# Patient Record
Sex: MALE | Race: WHITE | NOT HISPANIC OR LATINO | Employment: OTHER | URBAN - NONMETROPOLITAN AREA
[De-identification: names, ages, dates, MRNs, and addresses within clinical notes are randomized per-mention and may not be internally consistent; named-entity substitution may affect disease eponyms.]

---

## 2017-12-08 ENCOUNTER — OFFICE VISIT (OUTPATIENT)
Dept: CARDIOLOGY | Facility: CLINIC | Age: 52
End: 2017-12-08

## 2017-12-08 VITALS
HEIGHT: 75 IN | WEIGHT: 258 LBS | DIASTOLIC BLOOD PRESSURE: 84 MMHG | SYSTOLIC BLOOD PRESSURE: 145 MMHG | BODY MASS INDEX: 32.08 KG/M2 | HEART RATE: 100 BPM

## 2017-12-08 DIAGNOSIS — I10 ESSENTIAL HYPERTENSION: Primary | ICD-10-CM

## 2017-12-08 DIAGNOSIS — E78.5 DYSLIPIDEMIA: ICD-10-CM

## 2017-12-08 DIAGNOSIS — Z87.898 HISTORY OF CHEST PAIN: ICD-10-CM

## 2017-12-08 DIAGNOSIS — R06.2 WHEEZING: ICD-10-CM

## 2017-12-08 PROCEDURE — 93000 ELECTROCARDIOGRAM COMPLETE: CPT | Performed by: PHYSICIAN ASSISTANT

## 2017-12-08 PROCEDURE — 99214 OFFICE O/P EST MOD 30 MIN: CPT | Performed by: PHYSICIAN ASSISTANT

## 2017-12-08 RX ORDER — METOPROLOL TARTRATE 50 MG/1
50 TABLET, FILM COATED ORAL 2 TIMES DAILY
Qty: 60 TABLET | Refills: 11 | Status: SHIPPED | OUTPATIENT
Start: 2017-12-08 | End: 2018-12-03 | Stop reason: SDUPTHER

## 2017-12-08 RX ORDER — ALBUTEROL SULFATE 90 UG/1
2 AEROSOL, METERED RESPIRATORY (INHALATION) EVERY 4 HOURS PRN
Qty: 8 G | Refills: 1 | Status: SHIPPED | OUTPATIENT
Start: 2017-12-08 | End: 2019-12-02 | Stop reason: SDUPTHER

## 2017-12-08 NOTE — PROGRESS NOTES
Oliverio Escobar MD  áSnchez Chandler  1965 12/08/2017    Patient Active Problem List   Diagnosis   • Essential hypertension   • History of chest pain   • Dyslipidemia     Dear Oliverio Escobar MD:    Chief Complaint   Patient presents with   • Hypertension     follow up     Subjective     Sánchez Chandler is a 52 y.o. male with a past medical history significant for hypertension and a history of previous chest pains.  He is here today for annual cardiology follow-up.  He had a previous inconclusive stress test in 2013. He was only able to achieve about 70% of the predicted maximal heart rate.  He denies any complaints of chest pains, shortness breath, palpitations, or pedal edema.  He has noted his blood pressure will run high at times, usually at night.  Otherwise, he has been doing well.      Current Outpatient Prescriptions:   •  Flaxseed, Linseed, (FLAXSEED OIL) 1000 MG capsule, Take  by mouth., Disp: , Rfl:   •  metoprolol tartrate (LOPRESSOR) 50 MG tablet, Take 1 tablet by mouth 2 (Two) Times a Day., Disp: 60 tablet, Rfl: 11  •  albuterol (PROVENTIL HFA;VENTOLIN HFA) 108 (90 Base) MCG/ACT inhaler, Inhale 2 puffs Every 4 (Four) Hours As Needed for Wheezing., Disp: 8 g, Rfl: 1    The following portions of the patient's history were reviewed and updated as appropriate: allergies, current medications, past family history, past medical history, past social history, past surgical history and problem list.    Social History     Social History   • Marital status: Single     Spouse name: N/A   • Number of children: N/A   • Years of education: N/A     Occupational History   • Not on file.     Social History Main Topics   • Smoking status: Never Smoker   • Smokeless tobacco: Never Used   • Alcohol use No   • Drug use: No   • Sexual activity: Defer     Other Topics Concern   • Not on file     Social History Narrative   • No narrative on file     Review of Systems   Cardiovascular: Negative for chest  "pain, leg swelling and palpitations.   Respiratory: Negative for shortness of breath.      Objective   Blood pressure 145/84, pulse 100, height 190.5 cm (75\"), weight 117 kg (258 lb).    Physical Exam   Constitutional: He is oriented to person, place, and time. He appears well-developed and well-nourished. No distress.   HENT:   Head: Normocephalic and atraumatic.   Eyes: Conjunctivae are normal. Right eye exhibits no discharge. Left eye exhibits no discharge.   Neck: Normal range of motion. Neck supple. Carotid bruit is not present.   Cardiovascular: Normal rate, regular rhythm and normal heart sounds.  Exam reveals no gallop and no friction rub.    No murmur heard.  Pulmonary/Chest: Effort normal and breath sounds normal. No respiratory distress. He has no wheezes. He has no rales. He exhibits no tenderness.   Musculoskeletal: Normal range of motion. He exhibits no edema.   Neurological: He is alert and oriented to person, place, and time.   Skin: Skin is warm and dry. No rash noted. He is not diaphoretic. No erythema. No pallor.   Psychiatric: He has a normal mood and affect. His behavior is normal.   Nursing note and vitals reviewed.      ECG 12 Lead  Date/Time: 12/8/2017 9:25 AM  Performed by: SANGITA BOCANEGRA  Authorized by: RACHAEL VALENZUELA   Comparison: compared with previous ECG   Rhythm: sinus rhythm  Rate: normal  T depression: V1  Q waves: III  Clinical impression: non-specific ECG  Comments: Sinus rhythm with chronic Q waves in lead 3 which are narrow.  No significant change compared to previous EKG.                   Assessment:          Diagnosis Plan   1. Essential hypertension  ECG 12 Lead   2. Dyslipidemia  ECG 12 Lead   3. History of chest pain  ECG 12 Lead    None recent.  No significant changes noted on EKG.   4. Wheezing          Plan:       1. Increase metoprolol tartrate to 50 mg twice a day from only taking it once a day.  2. He has also requested a Ventolin inhaler refill.  I " will go ahead and refill this, however I have requested that he make a follow-up with his primary care provider as he has not seen him in some time.  He needs regular labs and regular screening tests which are appropriate for his age and history.  He expressed understanding and agreed to make an appointment.  3. We will follow-up the patient in one year sooner if needed.    Return in about 1 year (around 12/8/2018).    I appreciate the opportunity to participate in this patient's cardiovascular care.    Best Regards,    Cathie Grewal PA-C

## 2018-12-03 ENCOUNTER — OFFICE VISIT (OUTPATIENT)
Dept: CARDIOLOGY | Facility: CLINIC | Age: 53
End: 2018-12-03

## 2018-12-03 VITALS
SYSTOLIC BLOOD PRESSURE: 136 MMHG | HEART RATE: 81 BPM | RESPIRATION RATE: 16 BRPM | DIASTOLIC BLOOD PRESSURE: 76 MMHG | WEIGHT: 256 LBS | BODY MASS INDEX: 34.67 KG/M2 | HEIGHT: 72 IN

## 2018-12-03 DIAGNOSIS — I10 ESSENTIAL HYPERTENSION: Primary | ICD-10-CM

## 2018-12-03 DIAGNOSIS — E78.5 DYSLIPIDEMIA: ICD-10-CM

## 2018-12-03 PROCEDURE — 93000 ELECTROCARDIOGRAM COMPLETE: CPT | Performed by: NURSE PRACTITIONER

## 2018-12-03 PROCEDURE — 99213 OFFICE O/P EST LOW 20 MIN: CPT | Performed by: NURSE PRACTITIONER

## 2018-12-03 RX ORDER — METOPROLOL TARTRATE 50 MG/1
50 TABLET, FILM COATED ORAL 2 TIMES DAILY
Qty: 60 TABLET | Refills: 11 | Status: SHIPPED | OUTPATIENT
Start: 2018-12-03 | End: 2019-12-02 | Stop reason: SDUPTHER

## 2018-12-03 NOTE — PROGRESS NOTES
Oliverio Escobar MD  Sánchez Chandler  1965 12/03/2018    Patient Active Problem List   Diagnosis   • Essential hypertension   • History of chest pain   • Dyslipidemia       Dear Oliverio Escobar MD:    Subjective     Chief Complaint   Patient presents with   • Follow-up     1 yr   • Shortness of Breath     mod exertion   • Med Management     presented           History of Present Illness:    Sánchez Chandler is a 53 y.o. male with a history of hypertension, dyslipidemia, and previous complaints of chest pain. He is here today for annual follow up. He reports he has been doing well. He denies chest pain, shortness of breath, palpitations, dizziness, lightheadedness, and edema. He has not been checking his blood pressure routinely. BP in the office today is controlled.          Allergies   Allergen Reactions   • Atorvastatin    • Penicillins    :      Current Outpatient Medications:   •  albuterol (PROVENTIL HFA;VENTOLIN HFA) 108 (90 Base) MCG/ACT inhaler, Inhale 2 puffs Every 4 (Four) Hours As Needed for Wheezing., Disp: 8 g, Rfl: 1  •  Flaxseed, Linseed, (FLAXSEED OIL) 1000 MG capsule, Take  by mouth., Disp: , Rfl:   •  metoprolol tartrate (LOPRESSOR) 50 MG tablet, Take 1 tablet by mouth 2 (Two) Times a Day., Disp: 60 tablet, Rfl: 11      The following portions of the patient's history were reviewed and updated as appropriate: allergies, current medications, past family history, past medical history, past social history, past surgical history and problem list.    Social History     Tobacco Use   • Smoking status: Never Smoker   • Smokeless tobacco: Never Used   Substance Use Topics   • Alcohol use: No   • Drug use: No       Review of Systems   Constitution: Negative for malaise/fatigue.   Cardiovascular: Negative for chest pain, dyspnea on exertion, leg swelling and palpitations.   Respiratory: Negative for cough, shortness of breath and wheezing.    Neurological: Negative for dizziness and  "light-headedness.       Objective   Vitals:    12/03/18 0920   BP: 136/76   Pulse: 81   Resp: 16   Weight: 116 kg (256 lb)   Height: 182.9 cm (72\")     Body mass index is 34.72 kg/m².        Physical Exam   Constitutional: He is oriented to person, place, and time. He appears well-developed and well-nourished.   HENT:   Head: Normocephalic and atraumatic.   Cardiovascular: Normal rate, regular rhythm and normal heart sounds. Exam reveals no S3 and no S4.   No murmur heard.  Pulmonary/Chest: Effort normal and breath sounds normal. He has no wheezes. He has no rales.   Abdominal: Soft. Bowel sounds are normal.   Musculoskeletal: He exhibits no edema.   Neurological: He is alert and oriented to person, place, and time.   Skin: Skin is warm and dry.   Psychiatric: He has a normal mood and affect. His behavior is normal.       No results found for: NA, K, CL, CO2, BUN, CREATININE, LABGLOM, GLUCOSE, CALCIUM, AST, ALT, ALKPHOS, LABIL2  No results found for: CKTOTAL  No results found for: WBC, HGB, HCT, PLT  No results found for: INR  No results found for: MG  No results found for: TSH, PSA, CHLPL, TRIG, HDL, LDL   No results found for: BNP          ECG 12 Lead  Date/Time: 12/3/2018 9:22 AM  Performed by: Caridad Cassidy APRN  Authorized by: Caridad Cassidy APRN   Comparison: compared with previous ECG   Similar to previous ECG  Rhythm: sinus rhythm  BPM: 81  Q waves: III  Comments: No change compared to prior ECG 12/2017              Assessment/Plan    Diagnosis Plan   1. Essential hypertension  Comprehensive Metabolic Panel    metoprolol tartrate (LOPRESSOR) 50 MG tablet   2. Dyslipidemia  Lipid Panel                Recommendations:    1. Will continue current dose of metoprolol    2. I have ordered a CMP and FLP    3. I have also encouraged him to schedule a wellness check up with his PCP since he reports he has not had follow up there in years. He is agreeable.    4. Follow up in 1 year and PRN           "     Return in about 1 year (around 12/3/2019) for Recheck.    As always, I appreciate very much the opportunity to participate in the cardiovascular care of your patients.      With Best Regards,    JULIÁN Paz

## 2018-12-05 ENCOUNTER — TELEPHONE (OUTPATIENT)
Dept: CARDIOLOGY | Facility: CLINIC | Age: 53
End: 2018-12-05

## 2018-12-05 NOTE — TELEPHONE ENCOUNTER
Spoke with patient and his spouse.  He is taking flaxseed only for cholesterol. States he may have taken something in the past? Stated thinks may have been allergic to it?

## 2018-12-05 NOTE — TELEPHONE ENCOUNTER
----- Message from JULIÁN Paz sent at 12/5/2018 10:42 AM EST -----  His blood work indicates he needs to be on a statin for cholesterol. Has he ever taken any of these medications?

## 2018-12-06 NOTE — TELEPHONE ENCOUNTER
Pt notes that atorvastatin caused him to get an itchy, red rash.  He says he has never tried a different cholesterol med.

## 2018-12-10 ENCOUNTER — SPECIALTY PHARMACY (OUTPATIENT)
Dept: PHARMACY | Facility: HOSPITAL | Age: 53
End: 2018-12-10

## 2018-12-10 NOTE — PROGRESS NOTES
"Pharmacy Note  Medication: Repatha    Dose: 140mg Q 2 weeks  Referring Provider: Caridad Cassidy  Start Date: 12/10/18  Last Injection: N/A  PA status/Time Period:     Diagnosis Details:     Established CVD (with primary hyperlipidemia):  ACS -  Hx of MI -  Stable/unstable angina +  Coronary or other arterial revascularization -  Stroke -   PAD -  Other:   Familial Hypercholesterolemia (FH):   N/A  Past Medical History:   Diagnosis Date   • HTN (hypertension)      Social History     Socioeconomic History   • Marital status: Single     Spouse name: Not on file   • Number of children: Not on file   • Years of education: Not on file   • Highest education level: Not on file   Social Needs   • Financial resource strain: Not on file   • Food insecurity - worry: Not on file   • Food insecurity - inability: Not on file   • Transportation needs - medical: Not on file   • Transportation needs - non-medical: Not on file   Occupational History   • Not on file   Tobacco Use   • Smoking status: Never Smoker   • Smokeless tobacco: Never Used   Substance and Sexual Activity   • Alcohol use: No   • Drug use: No   • Sexual activity: Defer   Other Topics Concern   • Not on file   Social History Narrative   • Not on file     Allergies   Allergen Reactions   • Atorvastatin Hives and Rash     Pt states atorvastatin make \"itchy, red bumps appear all over his body\"   • Penicillins      Current Outpatient Medications   Medication Sig Dispense Refill   • albuterol (PROVENTIL HFA;VENTOLIN HFA) 108 (90 Base) MCG/ACT inhaler Inhale 2 puffs Every 4 (Four) Hours As Needed for Wheezing. 8 g 1   • Flaxseed, Linseed, (FLAXSEED OIL) 1000 MG capsule Take  by mouth.     • metoprolol tartrate (LOPRESSOR) 50 MG tablet Take 1 tablet by mouth 2 (Two) Times a Day. 60 tablet 11     No current facility-administered medications for this visit.        Labs  No results found for: CHOL, CHLPL, TRIG, HDL, LDL, LDLDIRECT    Assessment     Drug Dates Duration "   Current Lipid-lowering Thearpy        Previous Lipid-lowering Therapy atorvastatin  Unknown, allergy listed                           Pt was assessed today for PCSK9 inhibitor therapy.  The patient denies any allergies to the medication or latex and denies pregnancy, breastfeeding, or planning to become pregnant.      Plan    Will order Repatha 140mg and begin the prior authorization, if applicable.  Will see patient in clinic once prior authorization is obtained for injection training and medication counseling and follow-up with patient as necessary.

## 2018-12-11 ENCOUNTER — TELEPHONE (OUTPATIENT)
Dept: CARDIOLOGY | Facility: CLINIC | Age: 53
End: 2018-12-11

## 2018-12-12 NOTE — TELEPHONE ENCOUNTER
The Repatha was rejected by pt's insurance. I have scanned in the rejection form in this encounter.

## 2018-12-17 ENCOUNTER — TELEPHONE (OUTPATIENT)
Dept: CARDIOLOGY | Facility: CLINIC | Age: 53
End: 2018-12-17

## 2018-12-17 NOTE — TELEPHONE ENCOUNTER
----- Message from Lavinia Grimaldo RPH sent at 12/17/2018  8:55 AM EST -----  Regarding: PCSK9-Inhibitor Referral Patient   [facility] received a referral for the patient for Repatha 140 mg [id number]. We had the patient scheduled for his 1st clinic visit this morning. Upon arriving to pick his medication up at the pharmacy, he asked for a pill form of the medication and reported that he did not want an injection. I attempted to explain that the injection was every 2 weeks and that they were here today so that they could learn about the medication and how to inject it. Neither the patient nor the spouse wanted to proceed with the injection at this time. I encouraged them to reach out to your office to let you know and explained to them that it was important for his health to make sure he was on appropriate therapy to help control his cholesterol levels. The medication has been put on hold in the pharmacy.     If the patient changes their mind after you all speak with him and he would like to return this week, we could get him scheduled. If not, and the patient decides on a later date a new referral will need to be sent. If you don't mind to let the clinic know whether the injection will be pursued further or if the patients therapy will be changed.    Please give me a call if you have any questions [phone]      Thank you,  Lavinia Tristan. DEEJAY Grimaldo  12/17/18  9:30 AM

## 2018-12-18 NOTE — TELEPHONE ENCOUNTER
Patient came by office today to say he was not interested in taking any injection form of cholesterol med, Repatha. He is agreeable with any by mouth form.

## 2018-12-19 RX ORDER — EZETIMIBE 10 MG/1
10 TABLET ORAL DAILY
Qty: 30 TABLET | Refills: 6 | Status: SHIPPED | OUTPATIENT
Start: 2018-12-19 | End: 2019-12-02 | Stop reason: ALTCHOICE

## 2018-12-19 NOTE — TELEPHONE ENCOUNTER
Called Sánchez advised him. He expressed understanding. Sent in Zetia to [facility] in [place].

## 2018-12-20 ENCOUNTER — TELEPHONE (OUTPATIENT)
Dept: CARDIOLOGY | Facility: CLINIC | Age: 53
End: 2018-12-20

## 2018-12-20 NOTE — TELEPHONE ENCOUNTER
Pt called and stated that when he went to  his Zetia they advised him that he would have to pay $80 for it.   I advised him that I will call his pharmacy and see if it needs a PA. He expressed understanding.     Called [facility] in [place]. They stated that his Zetia does need a PA on it. I advised them to fax it over and I will put it on Milagro's desk.     Called pt back and advised him. He expressed understanding.

## 2019-05-17 RX ORDER — ALBUTEROL SULFATE 90 UG/1
AEROSOL, METERED RESPIRATORY (INHALATION)
Refills: 1 | OUTPATIENT
Start: 2019-05-17

## 2019-12-02 ENCOUNTER — OFFICE VISIT (OUTPATIENT)
Dept: CARDIOLOGY | Facility: CLINIC | Age: 54
End: 2019-12-02

## 2019-12-02 VITALS
OXYGEN SATURATION: 92 % | SYSTOLIC BLOOD PRESSURE: 141 MMHG | HEIGHT: 72 IN | BODY MASS INDEX: 34.95 KG/M2 | DIASTOLIC BLOOD PRESSURE: 78 MMHG | HEART RATE: 107 BPM | WEIGHT: 258 LBS | RESPIRATION RATE: 16 BRPM

## 2019-12-02 DIAGNOSIS — I10 ESSENTIAL HYPERTENSION: Primary | ICD-10-CM

## 2019-12-02 DIAGNOSIS — E78.5 DYSLIPIDEMIA: ICD-10-CM

## 2019-12-02 DIAGNOSIS — R06.2 WHEEZING: ICD-10-CM

## 2019-12-02 DIAGNOSIS — R05.9 COUGH: ICD-10-CM

## 2019-12-02 PROCEDURE — 99214 OFFICE O/P EST MOD 30 MIN: CPT | Performed by: NURSE PRACTITIONER

## 2019-12-02 PROCEDURE — 93000 ELECTROCARDIOGRAM COMPLETE: CPT | Performed by: NURSE PRACTITIONER

## 2019-12-02 RX ORDER — ALBUTEROL SULFATE 90 UG/1
2 AEROSOL, METERED RESPIRATORY (INHALATION) EVERY 4 HOURS PRN
Qty: 8 G | Refills: 1 | Status: SHIPPED | OUTPATIENT
Start: 2019-12-02

## 2019-12-02 RX ORDER — METOPROLOL TARTRATE 50 MG/1
50 TABLET, FILM COATED ORAL 2 TIMES DAILY
Qty: 60 TABLET | Refills: 11 | Status: SHIPPED | OUTPATIENT
Start: 2019-12-02 | End: 2020-12-10

## 2019-12-02 NOTE — PROGRESS NOTES
"Oliverio Escobar MD  Sánchez Chandler  1965 12/02/2019    Patient Active Problem List   Diagnosis   • Essential hypertension   • History of chest pain   • Dyslipidemia       Dear Oliverio Escobar MD:    Subjective     Chief Complaint   Patient presents with   • Follow-up     hypertension   • Shortness of Breath     coughing   • Med Refill           History of Present Illness:    Sánchez Chandler is a 54 y.o. male with a history of hypertension and dyslipidemia.  He presents today for routine cardiology follow-up.  Previously, LDL was noted to be 200.  The patient cannot tolerate statins due to allergy which causes hives and your urticaria.  Therefore, Repatha was recommended.  However, the patient reports he does not want to give himself a shot.  Therefore, he declined the medication.  His blood pressure has been well controlled.  However, the past few days he has been out of medication.  He is also requesting a refill on albuterol which was initially prescribed for an episode of wheezing.  He has not been following with his primary care as previously recommended. He reports he has had a chronic cough for years.          Allergies   Allergen Reactions   • Atorvastatin Hives and Rash     Pt states atorvastatin make \"itchy, red bumps appear all over his body\"   • Penicillins    :      Current Outpatient Medications:   •  metoprolol tartrate (LOPRESSOR) 50 MG tablet, Take 1 tablet by mouth 2 (Two) Times a Day., Disp: 60 tablet, Rfl: 11  •  albuterol sulfate  (90 Base) MCG/ACT inhaler, Inhale 2 puffs Every 4 (Four) Hours As Needed for Wheezing., Disp: 8 g, Rfl: 1  •  Flaxseed, Linseed, (FLAXSEED OIL) 1000 MG capsule, Take  by mouth., Disp: , Rfl:       The following portions of the patient's history were reviewed and updated as appropriate: allergies, current medications, past family history, past medical history, past social history, past surgical history and problem list.    Social History " "    Tobacco Use   • Smoking status: Never Smoker   • Smokeless tobacco: Never Used   Substance Use Topics   • Alcohol use: No   • Drug use: No       Review of Systems   Constitution: Negative for weakness and malaise/fatigue.   Cardiovascular: Negative for chest pain, dyspnea on exertion, irregular heartbeat, leg swelling, near-syncope, orthopnea, palpitations, paroxysmal nocturnal dyspnea and syncope.   Respiratory: Positive for wheezing. Negative for cough and shortness of breath.    Neurological: Negative for dizziness and light-headedness.       Objective   Vitals:    12/02/19 0920   BP: 141/78   Pulse: 107   Resp: 16   SpO2: 92%   Weight: 117 kg (258 lb)   Height: 182.9 cm (72\")     Body mass index is 34.99 kg/m².        Physical Exam   Constitutional: He is oriented to person, place, and time. He appears well-developed and well-nourished.   HENT:   Head: Normocephalic and atraumatic.   Cardiovascular: Normal rate, regular rhythm and normal heart sounds. Exam reveals no S3 and no S4.   No murmur heard.  Pulmonary/Chest: Effort normal and breath sounds normal. He has no wheezes. He has no rales.   Abdominal: Soft. Bowel sounds are normal.   Musculoskeletal: He exhibits no edema.   Neurological: He is alert and oriented to person, place, and time.   Skin: Skin is warm and dry.   Psychiatric: He has a normal mood and affect. His behavior is normal.           ECG 12 Lead  Date/Time: 12/2/2019 10:11 AM  Performed by: Caridad Cassidy APRN  Authorized by: Caridad Cassidy APRN   Comparison: compared with previous ECG   Similar to previous ECG  Rhythm: sinus rhythm  BPM: 95  Conduction: non-specific intraventricular conduction delay  Q waves: III                  Assessment/Plan    Diagnosis Plan   1. Essential hypertension  metoprolol tartrate (LOPRESSOR) 50 MG tablet    Lipid Panel    Comprehensive Metabolic Panel    ECG 12 Lead   2. Wheezing  albuterol sulfate  (90 Base) MCG/ACT inhaler    XR Chest 2 " View    ECG 12 Lead   3. Cough  XR Chest 2 View    ECG 12 Lead   4. Dyslipidemia  Lipid Panel    Comprehensive Metabolic Panel    ECG 12 Lead                Recommendations:    1.  We have discussed trying the Repatha infusion if this is cost effective for him.  He is agreeable.    2.  Continue current dose of metoprolol.    3.  CMP and lipid panel ordered.    4.  I have ordered a chest x-ray today for his chronic cough and given him 1 refill of albuterol.  However, I have explained he may have asthma or another respiratory condition that needs to be properly evaluated.  I have again asked him to follow-up with his PCP for this.  He voiced understanding.    5. Follow up in 7 months and PRN.    Return in about 7 months (around 7/2/2020) for Recheck.    As always, I appreciate very much the opportunity to participate in the cardiovascular care of your patients.      With Best Regards,    JULIÁN Paz

## 2019-12-03 NOTE — PROGRESS NOTES
Message   Received: Yesterday   Message Contents   Caridad Cassidy APRN [facility]             Pt is agreeable to proceed with Repatha infusion once per month. Please arrange. Thanks.        Referral scanned to chart and faxed to [phone].

## 2019-12-04 ENCOUNTER — MEDICATION THERAPY MANAGEMENT (OUTPATIENT)
Dept: PHARMACY | Facility: HOSPITAL | Age: 54
End: 2019-12-04

## 2019-12-04 NOTE — PROGRESS NOTES
"Pharmacy Note  Medication:  Repatha   Dose: 420mg  Referring Provider: Caridad Cassidy  Start Date: TBD  Last Injection: N/A   PA status/Time Period: TBD    Diagnosis Details:   Hyperlipidemia with statin allergy        Past Medical History:   Diagnosis Date   • HTN (hypertension)      Social History     Socioeconomic History   • Marital status: Single     Spouse name: Not on file   • Number of children: Not on file   • Years of education: Not on file   • Highest education level: Not on file   Tobacco Use   • Smoking status: Never Smoker   • Smokeless tobacco: Never Used   Substance and Sexual Activity   • Alcohol use: No   • Drug use: No   • Sexual activity: Defer     Allergies   Allergen Reactions   • Atorvastatin Hives and Rash     Pt states atorvastatin make \"itchy, red bumps appear all over his body\"   • Penicillins      Current Outpatient Medications   Medication Sig Dispense Refill   • albuterol sulfate  (90 Base) MCG/ACT inhaler Inhale 2 puffs Every 4 (Four) Hours As Needed for Wheezing. 8 g 1   • Flaxseed, Linseed, (FLAXSEED OIL) 1000 MG capsule Take  by mouth.     • metoprolol tartrate (LOPRESSOR) 50 MG tablet Take 1 tablet by mouth 2 (Two) Times a Day. 60 tablet 11     No current facility-administered medications for this visit.        Labs  No results found for: CHOL, CHLPL, TRIG, HDL, LDL, LDLDIRECT    Assessment     Drug Dates Notes   Current Lipid-lowering Thearpy None       Previous Lipid-lowering Therapy atorvastatin  Allergic reaction    zetia 10mg 12/19/18-12/2/19 Discontinued by another provider                     Pt was assessed today for PCSK9 inhibitor therapy.  The patient denies any allergies to the medication or latex.     Plan    Will order Repatha 420mg monthly and begin the prior authorization, if applicable.  Will see patient in clinic once prior authorization is obtained for injection training and medication counseling and follow-up with patient as necessary.        Alma Rosa" Ora Serna, [facility]  12/04/19  3:23 PM

## 2019-12-16 ENCOUNTER — MEDICATION THERAPY MANAGEMENT (OUTPATIENT)
Dept: PHARMACY | Facility: HOSPITAL | Age: 54
End: 2019-12-16

## 2019-12-16 ENCOUNTER — APPOINTMENT (OUTPATIENT)
Dept: PHARMACY | Facility: HOSPITAL | Age: 54
End: 2019-12-16

## 2019-12-16 DIAGNOSIS — E78.5 HYPERLIPIDEMIA, UNSPECIFIED HYPERLIPIDEMIA TYPE: Primary | ICD-10-CM

## 2019-12-16 NOTE — PROGRESS NOTES
"Pharmacy Note  Medication:  Repatha   Dose: 420mg  Referring Provider: Caridad Cassidy  Start Date: 12/16/19  Last Injection: N/A   PA status/Time Period: TBD    Diagnosis Details:   Hyperlipidemia with statin allergy        Past Medical History:   Diagnosis Date   • HTN (hypertension)      Social History     Socioeconomic History   • Marital status: Single     Spouse name: Not on file   • Number of children: Not on file   • Years of education: Not on file   • Highest education level: Not on file   Tobacco Use   • Smoking status: Never Smoker   • Smokeless tobacco: Never Used   Substance and Sexual Activity   • Alcohol use: No   • Drug use: No   • Sexual activity: Defer     Allergies   Allergen Reactions   • Atorvastatin Hives and Rash     Pt states atorvastatin make \"itchy, red bumps appear all over his body\"   • Penicillins      Current Outpatient Medications   Medication Sig Dispense Refill   • albuterol sulfate  (90 Base) MCG/ACT inhaler Inhale 2 puffs Every 4 (Four) Hours As Needed for Wheezing. 8 g 1   • Evolocumab with Infusor (REPATHA PUSHTRONEX SYSTEM) 420 MG/3.5ML solution cartridge Inject 420 mg under the skin into the appropriate area as directed Every 28 (Twenty-Eight) Days. 3.5 mL 5   • Flaxseed, Linseed, (FLAXSEED OIL) 1000 MG capsule Take  by mouth.     • metoprolol tartrate (LOPRESSOR) 50 MG tablet Take 1 tablet by mouth 2 (Two) Times a Day. 60 tablet 11     No current facility-administered medications for this visit.        Labs  No results found for: CHOL, CHLPL, TRIG, HDL, LDL, LDLDIRECT    AssessmentThe patient was provided education and demonstration of proper administration, storage, and common side effects. The patient expressed understanding of information and demonstrated proper administration technique.      Drug Dates Notes   Current Lipid-lowering Thearpy None       Previous Lipid-lowering Therapy atorvastatin  Allergic reaction    zetia 10mg 12/19/18-12/2/19 Discontinued by " another provider                     Pt was assessed today for PCSK9 inhibitor therapy.  The patient denies any allergies to the medication or latex.     Plan    Will order Repatha 420mg monthly and begin the prior authorization, if applicable.  Will see patient in clinic once prior authorization is obtained for injection training and medication counseling and follow-up with patient as necessary.        The patient was provided education and demonstration of proper administration, storage, and common side effects. The patient expressed understanding of information and demonstrated proper administration technique.  The patient and spouse would like to return to the clinic another time to be observed giving the infusion and then would like to do it on their own at home. The patient will be followed up in 4 weeks for his second injection as well as follow-up labs.     Lavinia Tristan. DEEJAY Grimaldo  12/16/19  2:01 PM

## 2020-01-13 ENCOUNTER — MEDICATION THERAPY MANAGEMENT (OUTPATIENT)
Dept: PHARMACY | Facility: HOSPITAL | Age: 55
End: 2020-01-13

## 2020-01-13 NOTE — PROGRESS NOTES
"Pharmacy Note  Medication:  Repatha   Dose: 420mg  Referring Provider: Caridad Cassidy  Start Date: 12/16/19  Last Injection: N/A   PA status/Time Period: TBD    Diagnosis Details:   Hyperlipidemia with statin allergy        Past Medical History:   Diagnosis Date   • HTN (hypertension)      Social History     Socioeconomic History   • Marital status: Single     Spouse name: Not on file   • Number of children: Not on file   • Years of education: Not on file   • Highest education level: Not on file   Tobacco Use   • Smoking status: Never Smoker   • Smokeless tobacco: Never Used   Substance and Sexual Activity   • Alcohol use: No   • Drug use: No   • Sexual activity: Defer     Allergies   Allergen Reactions   • Atorvastatin Hives and Rash     Pt states atorvastatin make \"itchy, red bumps appear all over his body\"   • Penicillins      Current Outpatient Medications   Medication Sig Dispense Refill   • albuterol sulfate  (90 Base) MCG/ACT inhaler Inhale 2 puffs Every 4 (Four) Hours As Needed for Wheezing. 8 g 1   • Evolocumab with Infusor (REPATHA PUSHTRONEX SYSTEM) 420 MG/3.5ML solution cartridge Inject 420 mg under the skin into the appropriate area as directed Every 28 (Twenty-Eight) Days. 3.5 mL 5   • Flaxseed, Linseed, (FLAXSEED OIL) 1000 MG capsule Take  by mouth.     • metoprolol tartrate (LOPRESSOR) 50 MG tablet Take 1 tablet by mouth 2 (Two) Times a Day. 60 tablet 11     No current facility-administered medications for this visit.        Labs   resulted on 12/16/19 (via scanned labs in media tab)  AssessmentThe patient was provided education and demonstration of proper administration, storage, and common side effects. The patient expressed understanding of information and demonstrated proper administration technique.      Drug Dates Notes   Current Lipid-lowering Thearpy None       Previous Lipid-lowering Therapy atorvastatin  Allergic reaction    zetia 10mg 12/19/18-12/2/19 Discontinued by " another provider                     Pt was assessed today for PCSK9 inhibitor therapy.  The patient denies any allergies to the medication or latex.     Plan    Will order Repatha 420mg monthly and begin the prior authorization, if applicable.  Will see patient in clinic once prior authorization is obtained for injection training and medication counseling and follow-up with patient as necessary.        The patient was provided education and demonstration of proper administration, storage, and common side effects. The patient expressed understanding of information and demonstrated proper administration technique.  The patient and spouse came for second clinic visit today and once again demonstrated correct administration and wish to provide injections at home from this point on. Patient was encouraged to call the clinic if they decided they did not want to do the injection at home and could return monthly to the clinic to get injection.   Injection was given in left arm (1st injection given in right arm)  Patient was also asked that he receive new lab work in the next 4 weeks.      Patient would like for medication to be mailed. Information was reviewed for correct phone number and address and given to Natalie.     Daphne Munoz, Pharmacy Intern   Lavinia Grimaldo, Pharm D  01/13/20  11:47 AM

## 2020-02-04 ENCOUNTER — LAB (OUTPATIENT)
Dept: LAB | Facility: HOSPITAL | Age: 55
End: 2020-02-04

## 2020-02-04 DIAGNOSIS — E78.5 HYPERLIPIDEMIA, UNSPECIFIED HYPERLIPIDEMIA TYPE: ICD-10-CM

## 2020-02-04 DIAGNOSIS — I10 ESSENTIAL HYPERTENSION: ICD-10-CM

## 2020-02-04 DIAGNOSIS — E78.5 DYSLIPIDEMIA: ICD-10-CM

## 2020-02-04 LAB
ALBUMIN SERPL-MCNC: 4.4 G/DL (ref 3.5–5.2)
ALBUMIN/GLOB SERPL: 1.8 G/DL
ALP SERPL-CCNC: 61 U/L (ref 39–117)
ALT SERPL W P-5'-P-CCNC: 31 U/L (ref 1–41)
ANION GAP SERPL CALCULATED.3IONS-SCNC: 13.2 MMOL/L (ref 5–15)
AST SERPL-CCNC: 23 U/L (ref 1–40)
BILIRUB SERPL-MCNC: 0.5 MG/DL (ref 0.2–1.2)
BUN BLD-MCNC: 18 MG/DL (ref 6–20)
BUN/CREAT SERPL: 16.1 (ref 7–25)
CALCIUM SPEC-SCNC: 9.2 MG/DL (ref 8.6–10.5)
CHLORIDE SERPL-SCNC: 104 MMOL/L (ref 98–107)
CHOLEST SERPL-MCNC: 108 MG/DL (ref 0–200)
CO2 SERPL-SCNC: 21.8 MMOL/L (ref 22–29)
CREAT BLD-MCNC: 1.12 MG/DL (ref 0.76–1.27)
GFR SERPL CREATININE-BSD FRML MDRD: 69 ML/MIN/1.73
GLOBULIN UR ELPH-MCNC: 2.5 GM/DL
GLUCOSE BLD-MCNC: 100 MG/DL (ref 65–99)
HDLC SERPL-MCNC: 28 MG/DL (ref 40–60)
LDLC SERPL CALC-MCNC: 52 MG/DL (ref 0–100)
LDLC/HDLC SERPL: 1.86 {RATIO}
POTASSIUM BLD-SCNC: 4 MMOL/L (ref 3.5–5.2)
PROT SERPL-MCNC: 6.9 G/DL (ref 6–8.5)
SODIUM BLD-SCNC: 139 MMOL/L (ref 136–145)
TRIGL SERPL-MCNC: 139 MG/DL (ref 0–150)
VLDLC SERPL-MCNC: 27.8 MG/DL (ref 5–40)

## 2020-02-04 PROCEDURE — 80061 LIPID PANEL: CPT

## 2020-02-04 PROCEDURE — 36415 COLL VENOUS BLD VENIPUNCTURE: CPT

## 2020-02-04 PROCEDURE — 80053 COMPREHEN METABOLIC PANEL: CPT

## 2020-02-06 ENCOUNTER — SPECIALTY PHARMACY (OUTPATIENT)
Dept: PHARMACY | Facility: HOSPITAL | Age: 55
End: 2020-02-06

## 2020-02-21 NOTE — PROGRESS NOTES
"      Specialty Pharmacy Note      Name:  Sánchez Chandler  :  1965  Date:  2020         Past Medical History:   Diagnosis Date   • HTN (hypertension)        Past Surgical History:   Procedure Laterality Date   • SKIN GRAFT         Social History     Socioeconomic History   • Marital status: Single     Spouse name: Not on file   • Number of children: Not on file   • Years of education: Not on file   • Highest education level: Not on file   Tobacco Use   • Smoking status: Never Smoker   • Smokeless tobacco: Never Used   Substance and Sexual Activity   • Alcohol use: No   • Drug use: No   • Sexual activity: Defer       Family History   Problem Relation Age of Onset   • No Known Problems Mother    • No Known Problems Father    • No Known Problems Sister    • No Known Problems Maternal Grandmother    • No Known Problems Maternal Grandfather    • No Known Problems Paternal Grandmother    • No Known Problems Paternal Grandfather        Allergies   Allergen Reactions   • Atorvastatin Hives and Rash     Pt states atorvastatin make \"itchy, red bumps appear all over his body\"   • Penicillins        Current Outpatient Medications   Medication Sig Dispense Refill   • albuterol sulfate  (90 Base) MCG/ACT inhaler Inhale 2 puffs Every 4 (Four) Hours As Needed for Wheezing. 8 g 1   • Evolocumab with Infusor (REPATHA PUSHTRONEX SYSTEM) 420 MG/3.5ML solution cartridge Inject 420 mg under the skin into the appropriate area as directed Every 28 (Twenty-Eight) Days. 3.5 mL 5   • Flaxseed, Linseed, (FLAXSEED OIL) 1000 MG capsule Take  by mouth.     • metoprolol tartrate (LOPRESSOR) 50 MG tablet Take 1 tablet by mouth 2 (Two) Times a Day. 60 tablet 11     No current facility-administered medications for this visit.          LABORATORY:    No results found for: CBC, CMP, BMP, IRON, TSH, PROTIME, INR, IRON, TIBC  No results found for: PROTIME, INR, PTT  No results found for: HAV, HCVQUANT, CARZVU56, HCVINFO, " HCVGENOTYPE  No results found for: AMPHETSCREEN, BARBITSCNUR, LABBENZSCN, COCAINEUR, LABMETHSCN, NBLDD9G, IINAY2UUFVYV, HEPBSAB, OXYCODONESCN, 6ACETYLMORP, BUPRENORSCNU, LABOPIASCN, PCPUR, THCURSCR  Last Urine Toxicity     There is no flowsheet data to display.          ASSESSMENT/PLAN:    Patient Update Assessment (new medications, allergies, medical history): No changes.     Medication(s): REPATHA PUSHTRONEX SYSTEM 420 MG/3.5ML solution cartridge.    Currently Taking Medication(s): Patient taking medication as directed.     Experiencing Side Effects: None expressed at this time.     Prior Authorization Status: Approved.    Financial Assistance Status: Assistance is not needed at this time, patient's insurance covers entire medication cost.     Any Issues Identified: No issues expressed from patient, no issues processing refill.     Appropriate to Process Prescription(s): Yes.  Medication will be dispensed from [facility] and delivered to patient via [employer] overnight services.    Counseling Offered: Patient counseled @ [facility] clinic visit, aware to call pharmacy/clinic with any new questions or concerns.     Next Specialty Pharmacy Visit: Scheduled for 02/27/2020.

## 2020-03-09 ENCOUNTER — SPECIALTY PHARMACY (OUTPATIENT)
Dept: PHARMACY | Facility: HOSPITAL | Age: 55
End: 2020-03-09

## 2020-03-12 NOTE — PROGRESS NOTES
"      Specialty Pharmacy Note      Name:  Sánchez Chandler  :  1965  Date:  3/12/2020         Past Medical History:   Diagnosis Date   • HTN (hypertension)        Past Surgical History:   Procedure Laterality Date   • SKIN GRAFT         Social History     Socioeconomic History   • Marital status: Single     Spouse name: Not on file   • Number of children: Not on file   • Years of education: Not on file   • Highest education level: Not on file   Tobacco Use   • Smoking status: Never Smoker   • Smokeless tobacco: Never Used   Substance and Sexual Activity   • Alcohol use: No   • Drug use: No   • Sexual activity: Defer       Family History   Problem Relation Age of Onset   • No Known Problems Mother    • No Known Problems Father    • No Known Problems Sister    • No Known Problems Maternal Grandmother    • No Known Problems Maternal Grandfather    • No Known Problems Paternal Grandmother    • No Known Problems Paternal Grandfather        Allergies   Allergen Reactions   • Atorvastatin Hives and Rash     Pt states atorvastatin make \"itchy, red bumps appear all over his body\"   • Penicillins        Current Outpatient Medications   Medication Sig Dispense Refill   • albuterol sulfate  (90 Base) MCG/ACT inhaler Inhale 2 puffs Every 4 (Four) Hours As Needed for Wheezing. 8 g 1   • Evolocumab with Infusor (REPATHA PUSHTRONEX SYSTEM) 420 MG/3.5ML solution cartridge Inject 420 mg under the skin into the appropriate area as directed Every 28 (Twenty-Eight) Days. 3.5 mL 5   • Flaxseed, Linseed, (FLAXSEED OIL) 1000 MG capsule Take  by mouth.     • metoprolol tartrate (LOPRESSOR) 50 MG tablet Take 1 tablet by mouth 2 (Two) Times a Day. 60 tablet 11     No current facility-administered medications for this visit.          LABORATORY:      Last Urine Toxicity     There is no flowsheet data to display.          ASSESSMENT/PLAN:    Patient Update Assessment (new medications, allergies, medical history): No changes. "      Medication(s): REPATHA PUSHTRONEX SYSTEM 420 MG/3.5ML solution cartridge.     Currently Taking Medication(s): Patient taking medication as directed.      Experiencing Side Effects: None expressed at this time.      Prior Authorization Status: Approved.     Financial Assistance Status: Assistance is not needed at this time, remaining co-pay balance due from patient = $0.      Any Issues Identified: None at this time.      Appropriate to Process Prescription(s): Yes.  Medication refill will be dispensed to patient from [facility].    Counseling Offered: Patient previously counseled upon initiation of therapy, aware to contact pharmacy with any new questions or concerns.    Next Specialty Pharmacy Visit: 04/07/2020

## 2020-04-07 ENCOUNTER — SPECIALTY PHARMACY (OUTPATIENT)
Dept: PHARMACY | Facility: HOSPITAL | Age: 55
End: 2020-04-07

## 2020-04-16 NOTE — PROGRESS NOTES
"      Specialty Pharmacy Note      Name:  Sánchez Chandler  :  1965  Date:  2020       Past Medical History:   Diagnosis Date   • HTN (hypertension)        Past Surgical History:   Procedure Laterality Date   • SKIN GRAFT         Social History     Socioeconomic History   • Marital status: Single     Spouse name: Not on file   • Number of children: Not on file   • Years of education: Not on file   • Highest education level: Not on file   Tobacco Use   • Smoking status: Never Smoker   • Smokeless tobacco: Never Used   Substance and Sexual Activity   • Alcohol use: No   • Drug use: No   • Sexual activity: Defer       Family History   Problem Relation Age of Onset   • No Known Problems Mother    • No Known Problems Father    • No Known Problems Sister    • No Known Problems Maternal Grandmother    • No Known Problems Maternal Grandfather    • No Known Problems Paternal Grandmother    • No Known Problems Paternal Grandfather        Allergies   Allergen Reactions   • Atorvastatin Hives and Rash     Pt states atorvastatin make \"itchy, red bumps appear all over his body\"   • Penicillins        Current Outpatient Medications   Medication Sig Dispense Refill   • albuterol sulfate  (90 Base) MCG/ACT inhaler Inhale 2 puffs Every 4 (Four) Hours As Needed for Wheezing. 8 g 1   • Evolocumab with Infusor (Repatha Pushtronex System) 420 MG/3.5ML solution cartridge Inject 420 mg under the skin into the appropriate area as directed Every 28 (Twenty-Eight) Days. 3.5 mL 5   • Flaxseed, Linseed, (FLAXSEED OIL) 1000 MG capsule Take  by mouth.     • metoprolol tartrate (LOPRESSOR) 50 MG tablet Take 1 tablet by mouth 2 (Two) Times a Day. 60 tablet 11     No current facility-administered medications for this visit.          LABORATORY:      Last Urine Toxicity     There is no flowsheet data to display.          ASSESSMENT/PLAN:    Patient Update Assessment (new medications, allergies, medical history): No changes. "      Medication(s): REPATHA PUSHTRONEX SYSTEM 420 MG/3.5ML solution cartridge.     Currently Taking Medication(s): Patient taking medication as directed.      Experiencing Side Effects: None expressed at this time.      Prior Authorization Status: Approved.     Financial Assistance Status: Assistance is not needed at this time, remaining co-pay balance due from patient = $0.      Any Issues Identified: None at this time.      Appropriate to Process Prescription(s): Yes.  Medication refill will be dispensed to patient from [facility].    Counseling Offered: Patient previously counseled upon initiation of therapy, aware to contact pharmacy with any new questions or concerns.    Next Specialty Pharmacy Visit: 5/5/2020

## 2020-05-05 ENCOUNTER — SPECIALTY PHARMACY (OUTPATIENT)
Dept: PHARMACY | Facility: HOSPITAL | Age: 55
End: 2020-05-05

## 2020-05-15 NOTE — PROGRESS NOTES
"      Specialty Pharmacy Note      Name:  Sánchez Chandler  :  1965  Date:  2020       Past Medical History:   Diagnosis Date   • HTN (hypertension)        Past Surgical History:   Procedure Laterality Date   • SKIN GRAFT         Social History     Socioeconomic History   • Marital status: Single     Spouse name: Not on file   • Number of children: Not on file   • Years of education: Not on file   • Highest education level: Not on file   Tobacco Use   • Smoking status: Never Smoker   • Smokeless tobacco: Never Used   Substance and Sexual Activity   • Alcohol use: No   • Drug use: No   • Sexual activity: Defer       Family History   Problem Relation Age of Onset   • No Known Problems Mother    • No Known Problems Father    • No Known Problems Sister    • No Known Problems Maternal Grandmother    • No Known Problems Maternal Grandfather    • No Known Problems Paternal Grandmother    • No Known Problems Paternal Grandfather        Allergies   Allergen Reactions   • Atorvastatin Hives and Rash     Pt states atorvastatin make \"itchy, red bumps appear all over his body\"   • Penicillins        Current Outpatient Medications   Medication Sig Dispense Refill   • albuterol sulfate  (90 Base) MCG/ACT inhaler Inhale 2 puffs Every 4 (Four) Hours As Needed for Wheezing. 8 g 1   • Evolocumab with Infusor (Repatha Pushtronex System) 420 MG/3.5ML solution cartridge Inject 420 mg under the skin into the appropriate area as directed Every 28 (Twenty-Eight) Days. 3.5 mL 5   • Flaxseed, Linseed, (FLAXSEED OIL) 1000 MG capsule Take  by mouth.     • metoprolol tartrate (LOPRESSOR) 50 MG tablet Take 1 tablet by mouth 2 (Two) Times a Day. 60 tablet 11     No current facility-administered medications for this visit.          LABORATORY:      Last Urine Toxicity     There is no flowsheet data to display.          ASSESSMENT/PLAN:    Patient Update Assessment (new medications, allergies, medical history): No changes. "      Medication(s): REPATHA PUSHTRONEX SYSTEM 420 MG/3.5ML solution cartridge.     Currently Taking Medication(s): Patient taking medication as directed.      Experiencing Side Effects: None expressed at this time.      Prior Authorization Status: Approved.     Financial Assistance Status: Assistance is not needed at this time, remaining co-pay balance due from patient = $0.      Any Issues Identified: None at this time.      Appropriate to Process Prescription(s): Yes.  Medication refill will be dispensed to patient from [facility].    Counseling Offered: Patient previously counseled upon initiation of therapy, aware to contact pharmacy with any new questions or concerns.    Next Specialty Pharmacy Visit: 6/2/2020

## 2020-06-01 ENCOUNTER — DISEASE STATE MANAGEMENT VISIT (OUTPATIENT)
Dept: PHARMACY | Facility: HOSPITAL | Age: 55
End: 2020-06-01

## 2020-06-01 NOTE — PROGRESS NOTES
"Pharmacy Note  Medication:  Repatha   Dose: 420mg  Referring Provider: Caridad Cassidy  Start Date: 12/16/19      Diagnosis Details:   Hyperlipidemia with statin allergy        Past Medical History:   Diagnosis Date   • HTN (hypertension)      Social History     Socioeconomic History   • Marital status: Single     Spouse name: Not on file   • Number of children: Not on file   • Years of education: Not on file   • Highest education level: Not on file   Tobacco Use   • Smoking status: Never Smoker   • Smokeless tobacco: Never Used   Substance and Sexual Activity   • Alcohol use: No   • Drug use: No   • Sexual activity: Defer     Allergies   Allergen Reactions   • Atorvastatin Hives and Rash     Pt states atorvastatin make \"itchy, red bumps appear all over his body\"   • Penicillins      Current Outpatient Medications   Medication Sig Dispense Refill   • albuterol sulfate  (90 Base) MCG/ACT inhaler Inhale 2 puffs Every 4 (Four) Hours As Needed for Wheezing. 8 g 1   • Evolocumab with Infusor (Repatha Pushtronex System) 420 MG/3.5ML solution cartridge Inject 420 mg under the skin into the appropriate area as directed Every 28 (Twenty-Eight) Days. 3.5 mL 5   • Flaxseed, Linseed, (FLAXSEED OIL) 1000 MG capsule Take  by mouth.     • metoprolol tartrate (LOPRESSOR) 50 MG tablet Take 1 tablet by mouth 2 (Two) Times a Day. 60 tablet 11     No current facility-administered medications for this visit.        Labs    2/4/20 LDL 52     Assessment     Drug Dates Notes   Current Lipid-lowering Thearpy None       Previous Lipid-lowering Therapy atorvastatin  Allergic reaction    zetia 10mg 12/19/18-12/2/19 Discontinued by another provider                       Pt was assessed today for PCSK9 inhibitor therapy.  Patient has diagnosis of hyperlipidemia and statin intolerance.  Most recent LDL is 52 while taking Repatha. Pt has been taking Repatha 420 mg every 28 days since Dec 2019. Patient most recent visit with the referring " cardiologist was 12/2/19 and has next visit scheduled for July 2, 2020.       Plan    Continue Repatha 420mg monthly. Pt will continue mail out service with speciality pharmacy. Will renew prescription for 6 months and follow up when PA/new prescription needs renewed or as needed.     Alma Rosa Serna, [facility]  06/01/20  11:21

## 2020-06-02 ENCOUNTER — SPECIALTY PHARMACY (OUTPATIENT)
Dept: PHARMACY | Facility: HOSPITAL | Age: 55
End: 2020-06-02

## 2020-06-12 NOTE — PROGRESS NOTES
"      Specialty Pharmacy Note      Name:  Sánchez Chandler  :  1965  Date:  2020         Past Medical History:   Diagnosis Date   • HTN (hypertension)        Past Surgical History:   Procedure Laterality Date   • SKIN GRAFT         Social History     Socioeconomic History   • Marital status: Single     Spouse name: Not on file   • Number of children: Not on file   • Years of education: Not on file   • Highest education level: Not on file   Tobacco Use   • Smoking status: Never Smoker   • Smokeless tobacco: Never Used   Substance and Sexual Activity   • Alcohol use: No   • Drug use: No   • Sexual activity: Defer       Family History   Problem Relation Age of Onset   • No Known Problems Mother    • No Known Problems Father    • No Known Problems Sister    • No Known Problems Maternal Grandmother    • No Known Problems Maternal Grandfather    • No Known Problems Paternal Grandmother    • No Known Problems Paternal Grandfather        Allergies   Allergen Reactions   • Atorvastatin Hives and Rash     Pt states atorvastatin make \"itchy, red bumps appear all over his body\"   • Penicillins        Current Outpatient Medications   Medication Sig Dispense Refill   • albuterol sulfate  (90 Base) MCG/ACT inhaler Inhale 2 puffs Every 4 (Four) Hours As Needed for Wheezing. 8 g 1   • Evolocumab with Infusor (Repatha Pushtronex System) 420 MG/3.5ML solution cartridge Inject 420 mg under the skin into the appropriate area as directed Every 28 (Twenty-Eight) Days. 3.5 mL 5   • Flaxseed, Linseed, (FLAXSEED OIL) 1000 MG capsule Take  by mouth.     • metoprolol tartrate (LOPRESSOR) 50 MG tablet Take 1 tablet by mouth 2 (Two) Times a Day. 60 tablet 11     No current facility-administered medications for this visit.          LABORATORY:    No results found for: WBC, HGB, HCT, MCV, RDW, PLT, NEUTRORELPCT, LYMPHORELPCT, MONORELPCT, EOSRELPCT, BASORELPCT, NEUTROABS, LYMPHSABS    Lab Results   Component Value Date    NA " 139 02/04/2020    K 4.0 02/04/2020    CO2 21.8 (L) 02/04/2020     02/04/2020    BUN 18 02/04/2020    CREATININE 1.12 02/04/2020    GLUCOSE 100 (H) 02/04/2020    CALCIUM 9.2 02/04/2020    ALKPHOS 61 02/04/2020    AST 23 02/04/2020    ALT 31 02/04/2020    BILITOT 0.5 02/04/2020    ALBUMIN 4.40 02/04/2020    PROTEINTOT 6.9 02/04/2020       No results found for: LDH, URICACID     Imaging Results (Last 24 Hours)     ** No results found for the last 24 hours. **          ASSESSMENT/PLAN:    Patient Update Assessment (new medications, allergies, medical history): No changes.      Medication(s): REPATHA PUSHTRONEX SYSTEM 420 MG/3.5ML solution cartridge.     Currently Taking Medication(s): Patient taking medication as directed.      Experiencing Side Effects: None expressed at this time.      Prior Authorization Status: Approved.     Financial Assistance Status: Assistance is not needed at this time, remaining co-pay balance due from patient = $0.      Any Issues Identified: None at this time.      Appropriate to Process Prescription(s): Yes.  Medication refill will be dispensed to patient from [facility].     Counseling Offered: Patient previously counseled upon initiation of therapy, aware to contact pharmacy with any new questions or concerns.     Next Specialty Pharmacy Visit:  06/29/2020

## 2020-06-29 ENCOUNTER — SPECIALTY PHARMACY (OUTPATIENT)
Dept: PHARMACY | Facility: HOSPITAL | Age: 55
End: 2020-06-29

## 2020-07-17 NOTE — PROGRESS NOTES
"      Specialty Pharmacy Note      Name:  Sánchez Chandler  :  1965  Date:  2020         Past Medical History:   Diagnosis Date   • HTN (hypertension)        Past Surgical History:   Procedure Laterality Date   • SKIN GRAFT         Social History     Socioeconomic History   • Marital status: Single     Spouse name: Not on file   • Number of children: Not on file   • Years of education: Not on file   • Highest education level: Not on file   Tobacco Use   • Smoking status: Never Smoker   • Smokeless tobacco: Never Used   Substance and Sexual Activity   • Alcohol use: No   • Drug use: No   • Sexual activity: Defer       Family History   Problem Relation Age of Onset   • No Known Problems Mother    • No Known Problems Father    • No Known Problems Sister    • No Known Problems Maternal Grandmother    • No Known Problems Maternal Grandfather    • No Known Problems Paternal Grandmother    • No Known Problems Paternal Grandfather        Allergies   Allergen Reactions   • Atorvastatin Hives and Rash     Pt states atorvastatin make \"itchy, red bumps appear all over his body\"   • Penicillins        Current Outpatient Medications   Medication Sig Dispense Refill   • albuterol sulfate  (90 Base) MCG/ACT inhaler Inhale 2 puffs Every 4 (Four) Hours As Needed for Wheezing. 8 g 1   • Evolocumab with Infusor (Repatha Pushtronex System) 420 MG/3.5ML solution cartridge Inject 420 mg under the skin into the appropriate area as directed Every 28 (Twenty-Eight) Days. 3.5 mL 5   • Flaxseed, Linseed, (FLAXSEED OIL) 1000 MG capsule Take  by mouth.     • metoprolol tartrate (LOPRESSOR) 50 MG tablet Take 1 tablet by mouth 2 (Two) Times a Day. 60 tablet 11     No current facility-administered medications for this visit.          LABORATORY:    No results found for: WBC, HGB, HCT, MCV, RDW, PLT, NEUTRORELPCT, LYMPHORELPCT, MONORELPCT, EOSRELPCT, BASORELPCT, NEUTROABS, LYMPHSABS    Lab Results   Component Value Date    NA " 139 02/04/2020    K 4.0 02/04/2020    CO2 21.8 (L) 02/04/2020     02/04/2020    BUN 18 02/04/2020    CREATININE 1.12 02/04/2020    GLUCOSE 100 (H) 02/04/2020    CALCIUM 9.2 02/04/2020    ALKPHOS 61 02/04/2020    AST 23 02/04/2020    ALT 31 02/04/2020    BILITOT 0.5 02/04/2020    ALBUMIN 4.40 02/04/2020    PROTEINTOT 6.9 02/04/2020       No results found for: LDH, URICACID     Imaging Results (Last 24 Hours)     ** No results found for the last 24 hours. **          ASSESSMENT/PLAN:    Patient Update Assessment (new medications, allergies, medical history): No changes.      Medication(s): REPATHA PUSHTRONEX SYSTEM 420 MG/3.5ML solution cartridge.     Currently Taking Medication(s): Patient taking medication as directed.      Experiencing Side Effects: None expressed at this time.      Prior Authorization Status: Approved.     Financial Assistance Status: Assistance is not needed at this time, remaining co-pay balance due from patient = $0.      Any Issues Identified: None at this time.      Appropriate to Process Prescription(s): Yes.  Medication refill will be dispensed to patient from [facility].     Counseling Offered: Patient previously counseled upon initiation of therapy, aware to contact pharmacy with any new questions or concerns.     Next Specialty Pharmacy Visit:  7/27/2020

## 2020-08-03 ENCOUNTER — TELEPHONE (OUTPATIENT)
Dept: CARDIOLOGY | Facility: CLINIC | Age: 55
End: 2020-08-03

## 2020-08-03 NOTE — TELEPHONE ENCOUNTER
This needs to go to his PCP. Called pt to advise him and his number has been changed or disconnected.

## 2020-08-04 NOTE — TELEPHONE ENCOUNTER
Pt stopped by the office today and I advised him that he will need to contact his PCP for refills on this. He expressed understanding.

## 2020-08-10 ENCOUNTER — OFFICE VISIT (OUTPATIENT)
Dept: CARDIOLOGY | Facility: CLINIC | Age: 55
End: 2020-08-10

## 2020-08-10 VITALS
BODY MASS INDEX: 33.83 KG/M2 | OXYGEN SATURATION: 96 % | WEIGHT: 249.8 LBS | DIASTOLIC BLOOD PRESSURE: 82 MMHG | HEIGHT: 72 IN | HEART RATE: 82 BPM | TEMPERATURE: 98.5 F | SYSTOLIC BLOOD PRESSURE: 143 MMHG

## 2020-08-10 DIAGNOSIS — I10 ESSENTIAL HYPERTENSION: Primary | ICD-10-CM

## 2020-08-10 DIAGNOSIS — E78.5 DYSLIPIDEMIA: ICD-10-CM

## 2020-08-10 PROCEDURE — 93000 ELECTROCARDIOGRAM COMPLETE: CPT | Performed by: NURSE PRACTITIONER

## 2020-08-10 PROCEDURE — 99213 OFFICE O/P EST LOW 20 MIN: CPT | Performed by: NURSE PRACTITIONER

## 2020-08-10 NOTE — PROGRESS NOTES
"Oliverio Escobar MD  Sánchez Chandler  1965  08/10/2020    Patient Active Problem List   Diagnosis   • Essential hypertension   • History of chest pain   • Dyslipidemia       Dear Oliverio Escobar MD:    Subjective     Chief Complaint   Patient presents with   • Follow-up     8 mths f/up.    • Med Management     bottles.            History of Present Illness:    Sánchez Chandler is a 54 y.o. male with a past medical history significant for hypertension and dyslipidemia.  He presents today for routine cardiology follow-up.  Since his last visit, he has started Repatha.  His lipids significantly improved with his most recent LDL 52.  He is tolerating the medication well.  He denies any chest pains or shortness of breath.          Allergies   Allergen Reactions   • Atorvastatin Hives and Rash     Pt states atorvastatin make \"itchy, red bumps appear all over his body\"   • Penicillins    :      Current Outpatient Medications:   •  albuterol sulfate  (90 Base) MCG/ACT inhaler, Inhale 2 puffs Every 4 (Four) Hours As Needed for Wheezing., Disp: 8 g, Rfl: 1  •  Evolocumab with Infusor (Repatha Pushtronex System) 420 MG/3.5ML solution cartridge, Inject 420 mg under the skin into the appropriate area as directed Every 28 (Twenty-Eight) Days., Disp: 3.5 mL, Rfl: 5  •  Flaxseed, Linseed, (FLAXSEED OIL) 1000 MG capsule, Take  by mouth., Disp: , Rfl:   •  metoprolol tartrate (LOPRESSOR) 50 MG tablet, Take 1 tablet by mouth 2 (Two) Times a Day., Disp: 60 tablet, Rfl: 11      The following portions of the patient's history were reviewed and updated as appropriate: allergies, current medications, past family history, past medical history, past social history, past surgical history and problem list.    Social History     Tobacco Use   • Smoking status: Never Smoker   • Smokeless tobacco: Never Used   Substance Use Topics   • Alcohol use: No   • Drug use: No       Review of Systems   Constitution: Negative for " "decreased appetite and malaise/fatigue.   Cardiovascular: Negative for chest pain, dyspnea on exertion, irregular heartbeat, leg swelling, near-syncope, orthopnea, palpitations, paroxysmal nocturnal dyspnea and syncope.   Respiratory: Negative for cough, shortness of breath and wheezing.    Neurological: Negative for dizziness, light-headedness and weakness.       Objective   Vitals:    08/10/20 1312   BP: 143/82   BP Location: Left arm   Patient Position: Sitting   Cuff Size: Adult   Pulse: 82   Temp: 98.5 °F (36.9 °C)   SpO2: 96%   Weight: 113 kg (249 lb 12.8 oz)   Height: 182.9 cm (72\")     Body mass index is 33.88 kg/m².        Physical Exam   Constitutional: He is oriented to person, place, and time. He appears well-developed and well-nourished.   HENT:   Head: Normocephalic and atraumatic.   Cardiovascular: Normal rate, regular rhythm and normal heart sounds. Exam reveals no S3 and no S4.   No murmur heard.  Pulmonary/Chest: Effort normal and breath sounds normal. He has no wheezes. He has no rales.   Abdominal: Soft. Bowel sounds are normal.   Musculoskeletal: He exhibits no edema.   Neurological: He is alert and oriented to person, place, and time.   Skin: Skin is warm and dry.   Psychiatric: He has a normal mood and affect. His behavior is normal.       Lab Results   Component Value Date     02/04/2020    K 4.0 02/04/2020     02/04/2020    CO2 21.8 (L) 02/04/2020    BUN 18 02/04/2020    CREATININE 1.12 02/04/2020    GLUCOSE 100 (H) 02/04/2020    CALCIUM 9.2 02/04/2020    AST 23 02/04/2020    ALT 31 02/04/2020    ALKPHOS 61 02/04/2020       Lab Results   Component Value Date    TRIG 139 02/04/2020    HDL 28 (L) 02/04/2020    LDL 52 02/04/2020              ECG 12 Lead  Date/Time: 8/10/2020 1:08 PM  Performed by: Caridad Cassidy APRN  Authorized by: Caridad Cassidy APRN   Comparison: compared with previous ECG   Similar to previous ECG  Rhythm: sinus rhythm  BPM: 72  Conduction: right bundle " branch block              Assessment/Plan    Diagnosis Plan   1. Essential hypertension  ECG 12 Lead   2. Dyslipidemia  ECG 12 Lead    Comprehensive Metabolic Panel    Lipid Panel                Recommendations:    1.  We will continue with Repatha and metoprolol.    2.  I have ordered a CMP and lipid panel.    3.  Follow-up in 1 year or sooner if needed.    Return in about 1 year (around 8/10/2021) for Recheck.    As always, I appreciate very much the opportunity to participate in the cardiovascular care of your patients.      With Best Regards,    JULIÁN Paz

## 2020-10-30 ENCOUNTER — TELEPHONE (OUTPATIENT)
Dept: PHARMACY | Facility: HOSPITAL | Age: 55
End: 2020-10-30

## 2020-10-30 NOTE — TELEPHONE ENCOUNTER
Attempted to call patient multiple times over the past few months. Unable to contact. Haven't filled Repatha since June 2020. [facility] as of 10/30/2020.

## 2020-11-04 ENCOUNTER — TELEPHONE (OUTPATIENT)
Dept: CARDIOLOGY | Facility: CLINIC | Age: 55
End: 2020-11-04

## 2020-11-04 ENCOUNTER — TELEPHONE (OUTPATIENT)
Dept: PHARMACY | Facility: HOSPITAL | Age: 55
End: 2020-11-04

## 2020-11-04 NOTE — TELEPHONE ENCOUNTER
Patient arrived at the hospital to the [facility] after getting a discharge letter. Patient reported he no longer has a house phone and his cell doesn't get good service at his house. Patient is to restart Repatha per Caridad Cassidy as prescribed previously. Patient agrees to  the Repatha monthly since we were having a hard time reaching him. Patient picked up the medication today and will administer it himself today (11/04/2020) as well.

## 2020-12-10 DIAGNOSIS — I10 ESSENTIAL HYPERTENSION: ICD-10-CM

## 2020-12-10 RX ORDER — METOPROLOL TARTRATE 50 MG/1
TABLET, FILM COATED ORAL
Qty: 60 TABLET | Refills: 0 | Status: SHIPPED | OUTPATIENT
Start: 2020-12-10 | End: 2021-12-06

## 2020-12-20 ENCOUNTER — APPOINTMENT (OUTPATIENT)
Dept: GENERAL RADIOLOGY | Facility: HOSPITAL | Age: 55
End: 2020-12-20

## 2020-12-20 ENCOUNTER — HOSPITAL ENCOUNTER (EMERGENCY)
Facility: HOSPITAL | Age: 55
Discharge: HOME | End: 2020-12-20
Attending: EMERGENCY MEDICINE | Admitting: FAMILY MEDICINE

## 2020-12-20 VITALS
RESPIRATION RATE: 19 BRPM | BODY MASS INDEX: 33.86 KG/M2 | WEIGHT: 250 LBS | DIASTOLIC BLOOD PRESSURE: 74 MMHG | OXYGEN SATURATION: 95 % | HEIGHT: 72 IN | HEART RATE: 85 BPM | SYSTOLIC BLOOD PRESSURE: 141 MMHG | TEMPERATURE: 98.1 F

## 2020-12-20 DIAGNOSIS — J06.9 VIRAL UPPER RESPIRATORY TRACT INFECTION WITH COUGH: Primary | ICD-10-CM

## 2020-12-20 LAB
ALBUMIN SERPL-MCNC: 3.85 G/DL (ref 3.5–5.2)
ALBUMIN/GLOB SERPL: 1.1 G/DL
ALP SERPL-CCNC: 65 U/L (ref 39–117)
ALT SERPL W P-5'-P-CCNC: 48 U/L (ref 1–41)
ANION GAP SERPL CALCULATED.3IONS-SCNC: 8 MMOL/L (ref 5–15)
AST SERPL-CCNC: 40 U/L (ref 1–40)
BILIRUB SERPL-MCNC: 0.4 MG/DL (ref 0–1.2)
BUN SERPL-MCNC: 24 MG/DL (ref 6–20)
BUN/CREAT SERPL: 20 (ref 7–25)
CALCIUM SPEC-SCNC: 8.9 MG/DL (ref 8.6–10.5)
CHLORIDE SERPL-SCNC: 108 MMOL/L (ref 98–107)
CO2 SERPL-SCNC: 18 MMOL/L (ref 22–29)
CREAT SERPL-MCNC: 1.2 MG/DL (ref 0.76–1.27)
CRP SERPL-MCNC: 2.68 MG/DL (ref 0–0.5)
FLUAV RNA RESP QL NAA+PROBE: NOT DETECTED
FLUBV RNA RESP QL NAA+PROBE: NOT DETECTED
GFR SERPL CREATININE-BSD FRML MDRD: 63 ML/MIN/1.73
GLOBULIN UR ELPH-MCNC: 3.6 GM/DL
GLUCOSE SERPL-MCNC: 97 MG/DL (ref 65–99)
POTASSIUM SERPL-SCNC: 4.6 MMOL/L (ref 3.5–5.2)
PROT SERPL-MCNC: 7.4 G/DL (ref 6–8.5)
SARS-COV-2 RNA RESP QL NAA+PROBE: NOT DETECTED
SODIUM SERPL-SCNC: 134 MMOL/L (ref 136–145)

## 2020-12-20 PROCEDURE — 99283 EMERGENCY DEPT VISIT LOW MDM: CPT

## 2020-12-20 PROCEDURE — 80053 COMPREHEN METABOLIC PANEL: CPT | Performed by: PHYSICIAN ASSISTANT

## 2020-12-20 PROCEDURE — 87636 SARSCOV2 & INF A&B AMP PRB: CPT | Performed by: PHYSICIAN ASSISTANT

## 2020-12-20 PROCEDURE — 71045 X-RAY EXAM CHEST 1 VIEW: CPT

## 2020-12-20 PROCEDURE — 86140 C-REACTIVE PROTEIN: CPT | Performed by: PHYSICIAN ASSISTANT

## 2020-12-20 NOTE — ED PROVIDER NOTES
Subjective   Patient is a 55-year-old male with hypertension that presents the ED with complaints of flulike symptoms including cough and congestion.  His wife states that he is also felt hot but they have not taken his temperature with a thermometer as they do not have one at home.  He also states that he has had some chills.  He denies chest pain, shortness of breath, nausea, vomiting, headache, dizziness, sore throat, abdominal pain, or dysuria.  He states he has had no sick contacts or recent travel.      History provided by:  Patient   used: No    Cough  Cough characteristics:  Non-productive  Severity:  Moderate  Onset quality:  Sudden  Duration:  3 days  Timing:  Constant  Progression:  Worsening  Chronicity:  New  Smoker: no    Context: not animal exposure, not exposure to allergens, not fumes, not occupational exposure, not sick contacts, not smoke exposure, not upper respiratory infection, not weather changes and not with activity    Relieved by:  Nothing  Worsened by:  Nothing  Ineffective treatments:  None tried  Associated symptoms: chills, fever and myalgias    Associated symptoms: no chest pain, no diaphoresis, no ear fullness, no ear pain, no eye discharge, no headaches, no rash, no rhinorrhea, no shortness of breath, no sinus congestion, no sore throat, no weight loss and no wheezing        Review of Systems   Constitutional: Positive for chills and fever. Negative for diaphoresis and weight loss.   HENT: Positive for congestion. Negative for drooling, ear discharge, ear pain, rhinorrhea, sinus pressure, sinus pain, sneezing, sore throat, tinnitus and trouble swallowing.    Eyes: Negative.  Negative for photophobia, pain, discharge, redness and itching.   Respiratory: Positive for cough. Negative for chest tightness, shortness of breath and wheezing.    Cardiovascular: Negative.  Negative for chest pain.   Gastrointestinal: Negative.  Negative for abdominal pain, constipation,  "diarrhea, nausea and vomiting.   Endocrine: Negative.    Genitourinary: Negative.  Negative for difficulty urinating, dysuria, frequency and urgency.   Musculoskeletal: Positive for myalgias. Negative for arthralgias, back pain, gait problem, joint swelling, neck pain and neck stiffness.   Skin: Negative.  Negative for rash.   Neurological: Negative.  Negative for dizziness, syncope, facial asymmetry, weakness, light-headedness and headaches.   Psychiatric/Behavioral: Negative.  Negative for confusion.   All other systems reviewed and are negative.      Past Medical History:   Diagnosis Date   • HTN (hypertension)        Allergies   Allergen Reactions   • Atorvastatin Hives and Rash     Pt states atorvastatin make \"itchy, red bumps appear all over his body\"   • Penicillins        Past Surgical History:   Procedure Laterality Date   • SKIN GRAFT         Family History   Problem Relation Age of Onset   • No Known Problems Mother    • No Known Problems Father    • No Known Problems Sister    • No Known Problems Maternal Grandmother    • No Known Problems Maternal Grandfather    • No Known Problems Paternal Grandmother    • No Known Problems Paternal Grandfather        Social History     Socioeconomic History   • Marital status:      Spouse name: Not on file   • Number of children: Not on file   • Years of education: Not on file   • Highest education level: Not on file   Tobacco Use   • Smoking status: Never Smoker   • Smokeless tobacco: Never Used   Substance and Sexual Activity   • Alcohol use: No   • Drug use: No   • Sexual activity: Defer           Objective   Physical Exam  Vitals signs and nursing note reviewed.   Constitutional:       General: He is not in acute distress.     Appearance: He is well-developed. He is not diaphoretic.   HENT:      Head: Normocephalic and atraumatic.      Right Ear: Tympanic membrane and external ear normal.      Left Ear: Tympanic membrane and external ear normal.      Nose: " Nose normal.      Mouth/Throat:      Mouth: Mucous membranes are moist.      Pharynx: Oropharynx is clear. No posterior oropharyngeal erythema.   Eyes:      Extraocular Movements: Extraocular movements intact.      Conjunctiva/sclera: Conjunctivae normal.      Pupils: Pupils are equal, round, and reactive to light.   Neck:      Musculoskeletal: Normal range of motion and neck supple.      Vascular: No JVD.      Trachea: No tracheal deviation.   Cardiovascular:      Rate and Rhythm: Normal rate and regular rhythm.      Heart sounds: Normal heart sounds. No murmur.   Pulmonary:      Effort: Pulmonary effort is normal. No respiratory distress.      Breath sounds: Normal breath sounds. No wheezing.   Abdominal:      General: Bowel sounds are normal. There is no distension.      Palpations: Abdomen is soft.      Tenderness: There is no abdominal tenderness. There is no guarding or rebound.   Musculoskeletal: Normal range of motion.         General: No deformity.   Skin:     General: Skin is warm and dry.      Coloration: Skin is not pale.      Findings: No erythema or rash.   Neurological:      Mental Status: He is alert and oriented to person, place, and time.      Cranial Nerves: No cranial nerve deficit.   Psychiatric:         Behavior: Behavior normal.         Thought Content: Thought content normal.         Procedures           ED Course  ED Course as of Dec 20 2143   Sun Dec 20, 2020   1936 FINDINGS:  Single portable AP view(s) of the chest.  The heart and mediastinum are remarkable for aortic tortuosity. The right lung is clear. There is infiltrate in the left mid upper lung field with scarring and volume loss at the left lung base. These changes in  the left lung were present on the previous examination. No definite new infiltrates are seen. Vascular markings are normal and no effusions are noted.     IMPRESSION:  Chronic changes in the left lung are stable since the previous exam in 2012. No new infiltrates are  seen.     Signer Name: Rod Huertas MD   Signed: 12/20/2020 7:33 PM   XR Chest 1 View []   2142 Discussed plan of care with patient.  Advised if symptoms should worsen return to the ED.  Advised to follow-up with PCP in 1 to 2 days.    []      ED Course User Index  [] Ghazal Corley PA-C                                           [facility]    Final diagnoses:   Viral upper respiratory tract infection with cough            Ghazal Corley PA-C  12/20/20 2143

## 2021-05-04 ENCOUNTER — DISEASE STATE MANAGEMENT VISIT (OUTPATIENT)
Dept: PHARMACY | Facility: HOSPITAL | Age: 56
End: 2021-05-04

## 2021-05-04 NOTE — PROGRESS NOTES
"Pharmacy Note  Medication:  Repatha   Dose: 420mg  Referring Provider: Caridad Cassidy  Start Date: 12/16/19      Diagnosis Details:   Hyperlipidemia with statin allergy        Past Medical History:   Diagnosis Date   • HTN (hypertension)      Social History     Socioeconomic History   • Marital status:      Spouse name: Not on file   • Number of children: Not on file   • Years of education: Not on file   • Highest education level: Not on file   Tobacco Use   • Smoking status: Never Smoker   • Smokeless tobacco: Never Used   Substance and Sexual Activity   • Alcohol use: No   • Drug use: No   • Sexual activity: Defer     Allergies   Allergen Reactions   • Atorvastatin Hives and Rash     Pt states atorvastatin make \"itchy, red bumps appear all over his body\"   • Penicillins      Current Outpatient Medications   Medication Sig Dispense Refill   • albuterol sulfate  (90 Base) MCG/ACT inhaler Inhale 2 puffs Every 4 (Four) Hours As Needed for Wheezing. 8 g 1   • Evolocumab with Infusor (Repatha Pushtronex System) solution cartridge Inject 420 mg under the skin into the appropriate area as directed Every 28 (Twenty-Eight) Days. 3.5 mL 5   • Flaxseed, Linseed, (FLAXSEED OIL) 1000 MG capsule Take  by mouth.     • metoprolol tartrate (LOPRESSOR) 50 MG tablet Take 1 tablet by mouth twice daily 60 tablet 0     No current facility-administered medications for this visit.       Labs    2/4/20 LDL 52     Assessment     Drug Dates Notes   Current Lipid-lowering Thearpy Repatha 420mg monthly        Previous Lipid-lowering Therapy atorvastatin  Allergic reaction    zetia 10mg 12/19/18-12/2/19 Discontinued by another provider                       Pt was assessed today for PCSK9 inhibitor therapy.  Patient has diagnosis of hyperlipidemia and statin intolerance.  Most recent LDL is 52 while taking Repatha. Pt has been taking Repatha 420 mg every 28 days but cannot remember the last time he had it. . Patient most recent " visit with the referring cardiologist was 08/2020 and has next visit scheduled for August 2021.       Plan    Continue Repatha 420mg monthly. Administered Repatha On-body Infusor device on right arm.  Note, device did sound louder than usual.  Pt reports feeling the medication going in as normal and cartridge looked empty/appropriate when finished. Will schedule patient for next month     Alma Rosa Serna PharmD  05/04/21  13:06 EDT

## 2021-05-29 ENCOUNTER — HOSPITAL ENCOUNTER (EMERGENCY)
Facility: HOSPITAL | Age: 56
Discharge: HOME | End: 2021-05-29
Attending: EMERGENCY MEDICINE | Admitting: EMERGENCY MEDICINE

## 2021-05-29 VITALS
SYSTOLIC BLOOD PRESSURE: 157 MMHG | WEIGHT: 250 LBS | HEART RATE: 89 BPM | HEIGHT: 72 IN | OXYGEN SATURATION: 98 % | TEMPERATURE: 98.3 F | RESPIRATION RATE: 20 BRPM | BODY MASS INDEX: 33.86 KG/M2 | DIASTOLIC BLOOD PRESSURE: 87 MMHG

## 2021-05-29 DIAGNOSIS — T22.211A PARTIAL THICKNESS BURN OF RIGHT FOREARM, INITIAL ENCOUNTER: Primary | ICD-10-CM

## 2021-05-29 PROCEDURE — 99282 EMERGENCY DEPT VISIT SF MDM: CPT

## 2021-05-29 RX ADMIN — SILVER SULFADIAZINE 1 APPLICATION: 10 CREAM TOPICAL at 14:29

## 2021-06-01 ENCOUNTER — DISEASE STATE MANAGEMENT VISIT (OUTPATIENT)
Dept: PHARMACY | Facility: HOSPITAL | Age: 56
End: 2021-06-01

## 2021-06-01 NOTE — PROGRESS NOTES
"Pharmacy Note  Medication:  Repatha   Dose: 420mg  Referring Provider: Caridad Cassidy  Start Date: 12/16/19      Diagnosis Details:   Hyperlipidemia with statin allergy        Past Medical History:   Diagnosis Date   • HTN (hypertension)      Social History     Socioeconomic History   • Marital status:      Spouse name: Not on file   • Number of children: Not on file   • Years of education: Not on file   • Highest education level: Not on file   Tobacco Use   • Smoking status: Never Smoker   • Smokeless tobacco: Never Used   Substance and Sexual Activity   • Alcohol use: No   • Drug use: No   • Sexual activity: Defer     Allergies   Allergen Reactions   • Atorvastatin Hives and Rash     Pt states atorvastatin make \"itchy, red bumps appear all over his body\"   • Penicillins      Current Outpatient Medications   Medication Sig Dispense Refill   • albuterol sulfate  (90 Base) MCG/ACT inhaler Inhale 2 puffs Every 4 (Four) Hours As Needed for Wheezing. 8 g 1   • Evolocumab with Infusor (Repatha Pushtronex System) solution cartridge Inject 420 mg under the skin into the appropriate area as directed Every 28 (Twenty-Eight) Days. 3.5 mL 5   • Flaxseed, Linseed, (FLAXSEED OIL) 1000 MG capsule Take  by mouth.     • metoprolol tartrate (LOPRESSOR) 50 MG tablet Take 1 tablet by mouth twice daily 60 tablet 0   • silver sulfadiazine (SILVADENE, SSD) 1 % cream Apply  topically to the appropriate area as directed 2 (Two) Times a Day. 50 g 0     No current facility-administered medications for this visit.       Labs    2/4/20 LDL 52     Assessment     Drug Dates Notes   Current Lipid-lowering Thearpy Repatha 420mg monthly        Previous Lipid-lowering Therapy atorvastatin  Allergic reaction    zetia 10mg 12/19/18-12/2/19 Discontinued by another provider                       Pt was assessed today for PCSK9 inhibitor therapy.  Patient has diagnosis of hyperlipidemia and statin intolerance.  Most recent LDL is 52 while " taking Repatha. Pt has been taking Repatha 420 mg every 28 days but cannot remember the last time he had it. . Patient most recent visit with the referring cardiologist was 08/2020 and has next visit scheduled for August 2021.       Plan    Continue Repatha 420mg monthly. Administered Repatha On-body Infusor device on right arm. Pt preferred this arm today. Patient continues to report good tolerance to injection. As always, patient was advised to remain on campus for 15 minutes post-injection. Patient reported feeling well leaving the clinic.      Will schedule patient for next month     Lavinia Grimaldo, PharmD  06/01/21  12:47 EDT

## 2021-06-03 ENCOUNTER — DISEASE STATE MANAGEMENT VISIT (OUTPATIENT)
Dept: PHARMACY | Facility: HOSPITAL | Age: 56
End: 2021-06-03

## 2021-06-03 RX ORDER — EVOLOCUMAB 420 MG/3.5
420 KIT SUBCUTANEOUS
Qty: 3.5 ML | Refills: 5 | Status: SHIPPED | OUTPATIENT
Start: 2021-06-03 | End: 2022-02-28 | Stop reason: SDUPTHER

## 2021-06-03 NOTE — PROGRESS NOTES
"Pharmacy Note  Medication:  Repatha   Dose: 420mg  Referring Provider: Caridad Cassidy  Start Date: 12/16/19      Diagnosis Details:   Hyperlipidemia with statin allergy        Past Medical History:   Diagnosis Date   • HTN (hypertension)      Social History     Socioeconomic History   • Marital status:      Spouse name: Not on file   • Number of children: Not on file   • Years of education: Not on file   • Highest education level: Not on file   Tobacco Use   • Smoking status: Never Smoker   • Smokeless tobacco: Never Used   Substance and Sexual Activity   • Alcohol use: No   • Drug use: No   • Sexual activity: Defer     Allergies   Allergen Reactions   • Atorvastatin Hives and Rash     Pt states atorvastatin make \"itchy, red bumps appear all over his body\"   • Penicillins      Current Outpatient Medications   Medication Sig Dispense Refill   • albuterol sulfate  (90 Base) MCG/ACT inhaler Inhale 2 puffs Every 4 (Four) Hours As Needed for Wheezing. 8 g 1   • Evolocumab with Infusor (Repatha Pushtronex System) solution cartridge Inject 420 mg under the skin into the appropriate area as directed Every 28 (Twenty-Eight) Days. 3.5 mL 5   • Flaxseed, Linseed, (FLAXSEED OIL) 1000 MG capsule Take  by mouth.     • metoprolol tartrate (LOPRESSOR) 50 MG tablet Take 1 tablet by mouth twice daily 60 tablet 0   • silver sulfadiazine (SILVADENE, SSD) 1 % cream Apply  topically to the appropriate area as directed 2 (Two) Times a Day. 50 g 0     No current facility-administered medications for this visit.       Labs    2/4/20 LDL 52     Assessment     Drug Dates Notes   Current Lipid-lowering Thearpy Repatha 420mg monthly        Previous Lipid-lowering Therapy atorvastatin  Allergic reaction    zetia 10mg 12/19/18-12/2/19 Discontinued by another provider                       Pt was assessed today for PCSK9 inhibitor therapy.  Patient has diagnosis of hyperlipidemia and statin intolerance. Pt has been taking Repatha " 420 mg every 28 days. Most recent LDL is 52 while taking Repatha on 2/4/20. Patient most recent visit with the referring cardiologist was 08/2020 and has next visit scheduled for August 2021.      Plan    Pt last received his last Repatha 420 mg injection on 6/1/21. Patient continues to report good tolerance to injection. Will place order for 5 more refills at this time. We will continue to follow up with pt on a monthly basis for in clinic injection administration. Will plan to inform the patient at next clinic appt that he will need a repeat lipid panel soon.       Shana Rey, PharmD  06/03/21  15:35 EDT

## 2021-07-02 ENCOUNTER — DISEASE STATE MANAGEMENT VISIT (OUTPATIENT)
Dept: PHARMACY | Facility: HOSPITAL | Age: 56
End: 2021-07-02

## 2021-07-02 NOTE — PROGRESS NOTES
"Pharmacy Note  Medication:  Repatha   Dose: 420mg  Referring Provider: Caridad Cassidy  Start Date: 12/16/19  Last Injection: 6/1/21      Diagnosis Details:   Hyperlipidemia with statin allergy        Past Medical History:   Diagnosis Date   • HTN (hypertension)      Social History     Socioeconomic History   • Marital status:      Spouse name: Not on file   • Number of children: Not on file   • Years of education: Not on file   • Highest education level: Not on file   Tobacco Use   • Smoking status: Never Smoker   • Smokeless tobacco: Never Used   Substance and Sexual Activity   • Alcohol use: No   • Drug use: No   • Sexual activity: Defer     Allergies   Allergen Reactions   • Atorvastatin Hives and Rash     Pt states atorvastatin make \"itchy, red bumps appear all over his body\"   • Penicillins      Current Outpatient Medications   Medication Sig Dispense Refill   • albuterol sulfate  (90 Base) MCG/ACT inhaler Inhale 2 puffs Every 4 (Four) Hours As Needed for Wheezing. 8 g 1   • Evolocumab with Infusor (Repatha Pushtronex System) solution cartridge Inject 420 mg under the skin into the appropriate area as directed Every 28 (Twenty-Eight) Days. 3.5 mL 5   • Flaxseed, Linseed, (FLAXSEED OIL) 1000 MG capsule Take  by mouth.     • metoprolol tartrate (LOPRESSOR) 50 MG tablet Take 1 tablet by mouth twice daily 60 tablet 0   • silver sulfadiazine (SILVADENE, SSD) 1 % cream Apply  topically to the appropriate area as directed 2 (Two) Times a Day. 50 g 0     No current facility-administered medications for this visit.       Labs    2/4/20 LDL 52     Assessment     Drug Dates Notes   Current Lipid-lowering Thearpy Repatha 420mg monthly        Previous Lipid-lowering Therapy atorvastatin  Allergic reaction    zetia 10mg 12/19/18-12/2/19 Discontinued by another provider                       Pt was assessed today for PCSK9 inhibitor therapy.  Patient has diagnosis of hyperlipidemia and statin intolerance. Pt " has been taking Repatha 420 mg every 28 days. Most recent LDL is 52 while taking Repatha on 2/4/20. Patient most recent visit with the referring cardiologist was 08/2020 and has next visit scheduled for August 2021.      Plan    Administered Repatha 420mg SC in left arm. Pt denies any issues or adverse effects and declined waiting for monitoring.  Pt will RTC in 1 month for next injection.  Pt will need a lipid panel ordered at that time prior to seeing Caridad.  Will follow-up in 1 month.       Alma Rosa Serna, PharmD  07/02/21  12:51 EDT

## 2021-08-02 ENCOUNTER — DISEASE STATE MANAGEMENT VISIT (OUTPATIENT)
Dept: PHARMACY | Facility: HOSPITAL | Age: 56
End: 2021-08-02

## 2021-08-02 NOTE — PROGRESS NOTES
"Pharmacy Note  Medication:  Repatha   Dose: 420mg  Referring Provider: Caridad Cassidy  Start Date: 12/16/19  Last Injection: 7/2/21      Diagnosis Details:   Hyperlipidemia with statin allergy        Past Medical History:   Diagnosis Date   • HTN (hypertension)      Social History     Socioeconomic History   • Marital status:      Spouse name: Not on file   • Number of children: Not on file   • Years of education: Not on file   • Highest education level: Not on file   Tobacco Use   • Smoking status: Never Smoker   • Smokeless tobacco: Never Used   Substance and Sexual Activity   • Alcohol use: No   • Drug use: No   • Sexual activity: Defer     Allergies   Allergen Reactions   • Atorvastatin Hives and Rash     Pt states atorvastatin make \"itchy, red bumps appear all over his body\"   • Penicillins      Current Outpatient Medications   Medication Sig Dispense Refill   • albuterol sulfate  (90 Base) MCG/ACT inhaler Inhale 2 puffs Every 4 (Four) Hours As Needed for Wheezing. 8 g 1   • Evolocumab with Infusor (Repatha Pushtronex System) solution cartridge Inject 420 mg under the skin into the appropriate area as directed Every 28 (Twenty-Eight) Days. 3.5 mL 5   • Flaxseed, Linseed, (FLAXSEED OIL) 1000 MG capsule Take  by mouth.     • metoprolol tartrate (LOPRESSOR) 50 MG tablet Take 1 tablet by mouth twice daily 60 tablet 0   • silver sulfadiazine (SILVADENE, SSD) 1 % cream Apply  topically to the appropriate area as directed 2 (Two) Times a Day. 50 g 0     No current facility-administered medications for this visit.       Labs    2/4/20 LDL 52     Assessment     Drug Dates Notes   Current Lipid-lowering Thearpy Repatha 420mg monthly        Previous Lipid-lowering Therapy atorvastatin  Allergic reaction    zetia 10mg 12/19/18-12/2/19 Discontinued by another provider                       Pt was assessed today for PCSK9 inhibitor therapy.  Patient has diagnosis of hyperlipidemia and statin intolerance. Pt " has been taking Repatha 420 mg every 28 days. Most recent LDL is 52 while taking Repatha on 2/4/20. Patient most recent visit with the referring cardiologist was 08/2020 and has next visit scheduled for September 2021.      Plan    Administered Repatha 420mg SC in RIGHT arm. Pt denies any issues or adverse effects and declined waiting for monitoring.  Pt will RTC in 1 month for next injection.  Pt will need a lipid panel ordered at that time prior to seeing cardiology.  Will follow-up in 1 month.       Alma Rosa Serna, PharmD  08/02/21  13:12 EDT

## 2021-09-07 ENCOUNTER — DISEASE STATE MANAGEMENT VISIT (OUTPATIENT)
Dept: PHARMACY | Facility: HOSPITAL | Age: 56
End: 2021-09-07

## 2021-09-07 NOTE — PROGRESS NOTES
"Pharmacy Note  Medication:  Repatha   Dose: 420mg  Referring Provider: Caridad Cassidy  Start Date: 12/16/19  Last Injection: 7/2/21      Diagnosis Details:   Hyperlipidemia with statin allergy        Past Medical History:   Diagnosis Date   • HTN (hypertension)      Social History     Socioeconomic History   • Marital status:      Spouse name: Not on file   • Number of children: Not on file   • Years of education: Not on file   • Highest education level: Not on file   Tobacco Use   • Smoking status: Never Smoker   • Smokeless tobacco: Never Used   Substance and Sexual Activity   • Alcohol use: No   • Drug use: No   • Sexual activity: Defer     Allergies   Allergen Reactions   • Atorvastatin Hives and Rash     Pt states atorvastatin make \"itchy, red bumps appear all over his body\"   • Penicillins      Current Outpatient Medications   Medication Sig Dispense Refill   • albuterol sulfate  (90 Base) MCG/ACT inhaler Inhale 2 puffs Every 4 (Four) Hours As Needed for Wheezing. 8 g 1   • Evolocumab with Infusor (Repatha Pushtronex System) solution cartridge Inject 420 mg under the skin into the appropriate area as directed Every 28 (Twenty-Eight) Days. 3.5 mL 5   • Flaxseed, Linseed, (FLAXSEED OIL) 1000 MG capsule Take  by mouth.     • metoprolol tartrate (LOPRESSOR) 50 MG tablet Take 1 tablet by mouth twice daily 60 tablet 0   • silver sulfadiazine (SILVADENE, SSD) 1 % cream Apply  topically to the appropriate area as directed 2 (Two) Times a Day. 50 g 0     No current facility-administered medications for this visit.       Labs    2/4/20 LDL 52     Assessment     Drug Dates Notes   Current Lipid-lowering Thearpy Repatha 420mg monthly        Previous Lipid-lowering Therapy atorvastatin  Allergic reaction    zetia 10mg 12/19/18-12/2/19 Discontinued by another provider                       Pt was assessed today for PCSK9 inhibitor therapy.  Patient has diagnosis of hyperlipidemia and statin intolerance. Pt " has been taking Repatha 420 mg every 28 days. Most recent LDL is 52 while taking Repatha on 2/4/20. Patient most recent visit with the referring cardiologist was 08/2020 and has next visit scheduled for September 2021.      Plan    Administered Repatha 420mg SC in RIGHT arm. Pt denies any issues or adverse effects and declined waiting for monitoring.  Pt will RTC in 1 month for next injection.  Pt will need a lipid panel ordered at that time prior to seeing cardiology.  Will follow-up in 1 month.       Jeni Parker, PharmD  09/07/21  09:35 EDT

## 2021-10-05 ENCOUNTER — DISEASE STATE MANAGEMENT VISIT (OUTPATIENT)
Dept: PHARMACY | Facility: HOSPITAL | Age: 56
End: 2021-10-05

## 2021-10-05 NOTE — PROGRESS NOTES
Reference # [id number]    [facility] [phone]    Will contact us to let us know when replacement product will be shipped.

## 2021-10-05 NOTE — PROGRESS NOTES
"Pharmacy Note  Medication:  Repatha   Dose: 420mg  Referring Provider: Caridad Cassidy  Start Date: 12/16/19  Last Injection: 9/7/21      Diagnosis Details:   Hyperlipidemia with statin allergy        Past Medical History:   Diagnosis Date   • HTN (hypertension)      Social History     Socioeconomic History   • Marital status:      Spouse name: Not on file   • Number of children: Not on file   • Years of education: Not on file   • Highest education level: Not on file   Tobacco Use   • Smoking status: Never Smoker   • Smokeless tobacco: Never Used   Substance and Sexual Activity   • Alcohol use: No   • Drug use: No   • Sexual activity: Defer     Allergies   Allergen Reactions   • Atorvastatin Hives and Rash     Pt states atorvastatin make \"itchy, red bumps appear all over his body\"   • Penicillins      Current Outpatient Medications   Medication Sig Dispense Refill   • albuterol sulfate  (90 Base) MCG/ACT inhaler Inhale 2 puffs Every 4 (Four) Hours As Needed for Wheezing. 8 g 1   • Evolocumab with Infusor (Repatha Pushtronex System) solution cartridge Inject 420 mg under the skin into the appropriate area as directed Every 28 (Twenty-Eight) Days. 3.5 mL 5   • Flaxseed, Linseed, (FLAXSEED OIL) 1000 MG capsule Take  by mouth.     • metoprolol tartrate (LOPRESSOR) 50 MG tablet Take 1 tablet by mouth twice daily 60 tablet 0   • silver sulfadiazine (SILVADENE, SSD) 1 % cream Apply  topically to the appropriate area as directed 2 (Two) Times a Day. 50 g 0     No current facility-administered medications for this visit.       Labs    2/4/20 LDL 52     Assessment     Drug Dates Notes   Current Lipid-lowering Thearpy Repatha 420mg monthly  Dec 2019-present      Previous Lipid-lowering Therapy atorvastatin  Allergic reaction    zetia 10mg 12/19/18-12/2/19 Discontinued by another provider                   Pt was assessed today for PCSK9 inhibitor therapy.  Patient has diagnosis of hyperlipidemia and statin " intolerance. Pt has been taking Repatha 420 mg every 28 days. Most recent LDL is 52 while taking Repatha on 2/4/20. Patient visit with the referring cardiologist scheduled for November 2021.      Plan    Placed Repatha 420mg on RIGHT arm (at his request). Pt denies any issues or adverse effects and declined waiting for monitoring.  After stepping away while the on-body infusor started, after approximately 2 minutes of infusion the noise stopped.  Checked in on patient and the adhesive had came off and the infusion had stopped on it's own.  Patient did report feeling medication infuse however once removed could still see approximately 3/4 of the medication still in vial.  Melinda is now calling company for replacement product and we will reach out to patient when it arrives.       Alma Rosa Serna, Aba  10/05/21  09:06 EDT

## 2021-10-12 ENCOUNTER — TELEPHONE (OUTPATIENT)
Dept: PHARMACY | Facility: HOSPITAL | Age: 56
End: 2021-10-12

## 2021-11-02 ENCOUNTER — SPECIALTY PHARMACY (OUTPATIENT)
Dept: PHARMACY | Facility: HOSPITAL | Age: 56
End: 2021-11-02

## 2021-11-02 ENCOUNTER — DISEASE STATE MANAGEMENT VISIT (OUTPATIENT)
Dept: PHARMACY | Facility: HOSPITAL | Age: 56
End: 2021-11-02

## 2021-11-02 NOTE — PROGRESS NOTES
"Pharmacy Note  Medication:  Repatha   Dose: 420mg  Referring Provider: Caridad Cassidy  Start Date: 12/16/19  Last Injection: 10/5/21      Diagnosis Details:   Hyperlipidemia with statin allergy        Past Medical History:   Diagnosis Date   • HTN (hypertension)      Social History     Socioeconomic History   • Marital status:    Tobacco Use   • Smoking status: Never Smoker   • Smokeless tobacco: Never Used   Substance and Sexual Activity   • Alcohol use: No   • Drug use: No   • Sexual activity: Defer     Allergies   Allergen Reactions   • Atorvastatin Hives and Rash     Pt states atorvastatin make \"itchy, red bumps appear all over his body\"   • Penicillins      Current Outpatient Medications   Medication Sig Dispense Refill   • albuterol sulfate  (90 Base) MCG/ACT inhaler Inhale 2 puffs Every 4 (Four) Hours As Needed for Wheezing. 8 g 1   • Evolocumab with Infusor (Repatha Pushtronex System) solution cartridge Inject 420 mg under the skin into the appropriate area as directed Every 28 (Twenty-Eight) Days. 3.5 mL 5   • Flaxseed, Linseed, (FLAXSEED OIL) 1000 MG capsule Take  by mouth.     • metoprolol tartrate (LOPRESSOR) 50 MG tablet Take 1 tablet by mouth twice daily 60 tablet 0   • silver sulfadiazine (SILVADENE, SSD) 1 % cream Apply  topically to the appropriate area as directed 2 (Two) Times a Day. 50 g 0     No current facility-administered medications for this visit.       Labs    2/4/20 LDL 52     Assessment     Drug Dates Notes   Current Lipid-lowering Thearpy Repatha 420mg monthly  Dec 2019-present      Previous Lipid-lowering Therapy atorvastatin  Allergic reaction    zetia 10mg 12/19/18-12/2/19 Discontinued by another provider                   Pt was assessed today for PCSK9 inhibitor therapy.  Patient has diagnosis of hyperlipidemia and statin intolerance. Pt has been taking Repatha 420 mg every 28 days. Most recent LDL is 52 while taking Repatha on 2/4/20. Patient visit with the " referring cardiologist scheduled for 11/17/21      Plan    Placed Repatha 420mg on RIGHT arm (at his request) using replacement product sent by company after issues with last injection. Pt denies any issues or adverse effects. Pt will RTC in 1 month for next injection.  Pt will need a lipid panel. Has appt w/ referring cardiologist's office 11/17.  Will follow-up in 1 month.       Jo-Ann Villalobos, PharmD  11/02/21  09:38 EDT

## 2021-11-11 NOTE — TELEPHONE ENCOUNTER
Trying to reach patient to notify that Repatha replacement is here and schedule him an appointment. I have made several attempts with no success and no voicemail set up to leave a message.  
aerobic capacity/endurance/ergonomics and body mechanics/gait, locomotion, and balance/muscle strength/posture/ROM

## 2021-12-02 ENCOUNTER — DISEASE STATE MANAGEMENT VISIT (OUTPATIENT)
Dept: PHARMACY | Facility: HOSPITAL | Age: 56
End: 2021-12-02

## 2021-12-02 ENCOUNTER — APPOINTMENT (OUTPATIENT)
Dept: PHARMACY | Facility: HOSPITAL | Age: 56
End: 2021-12-02

## 2021-12-02 ENCOUNTER — SPECIALTY PHARMACY (OUTPATIENT)
Dept: PHARMACY | Facility: HOSPITAL | Age: 56
End: 2021-12-02

## 2021-12-02 RX ORDER — EVOLOCUMAB 420 MG/3.5
420 KIT SUBCUTANEOUS
Qty: 3.5 ML | Refills: 5 | Status: SHIPPED | OUTPATIENT
Start: 2021-12-02 | End: 2022-05-23 | Stop reason: SDUPTHER

## 2021-12-02 NOTE — PROGRESS NOTES
"Pharmacy Note  Medication:  Repatha   Dose: 420mg  Referring Provider: Caridad Cassidy  Start Date: 12/16/19  Last Injection: 10/5/21      Diagnosis Details:   Hyperlipidemia with statin allergy        Past Medical History:   Diagnosis Date   • HTN (hypertension)      Social History     Socioeconomic History   • Marital status:    Tobacco Use   • Smoking status: Never Smoker   • Smokeless tobacco: Never Used   Substance and Sexual Activity   • Alcohol use: No   • Drug use: No   • Sexual activity: Defer     Allergies   Allergen Reactions   • Atorvastatin Hives and Rash     Pt states atorvastatin make \"itchy, red bumps appear all over his body\"   • Penicillins      Current Outpatient Medications   Medication Sig Dispense Refill   • albuterol sulfate  (90 Base) MCG/ACT inhaler Inhale 2 puffs Every 4 (Four) Hours As Needed for Wheezing. 8 g 1   • Evolocumab with Infusor (Repatha Pushtronex System) solution cartridge Inject 420 mg under the skin into the appropriate area as directed Every 28 (Twenty-Eight) Days. 3.5 mL 5   • Flaxseed, Linseed, (FLAXSEED OIL) 1000 MG capsule Take  by mouth.     • metoprolol tartrate (LOPRESSOR) 50 MG tablet Take 1 tablet by mouth twice daily (Patient taking differently: Take 50 mg by mouth Daily. Pt reports decreasing to once daily himself) 60 tablet 0     No current facility-administered medications for this visit.       Labs    2/4/20 LDL 52     Assessment     Drug Dates Notes   Current Lipid-lowering Thearpy Repatha 420mg monthly  Dec 2019-present      Previous Lipid-lowering Therapy atorvastatin  Allergic reaction    zetia 10mg 12/19/18-12/2/19 Discontinued by another provider                   Pt was assessed today for PCSK9 inhibitor therapy.  Patient has diagnosis of hyperlipidemia and statin intolerance. Pt has been taking Repatha 420 mg every 28 days. Most recent LDL is 52 while taking Repatha on 2/4/20. Patient visit with the referring cardiologist scheduled for " 12/6/21.      Plan    Placed Repatha 420mg on LEFT arm. Pt denies any issues or adverse effects and reports he is still tolerating injection well. Pt will RTC in 1 month for next injection.  Pt still needs lipid panel. Spoke with patient today about it and he reports that he will go get one but hasn't yet. Advised of importance of getting Lipid Panel before next injection and encouraged to go Monday morning before going to see Caridad so he was not having to try and make extra trips to [place]. Patient also cancelled last cardiology appt in November but does have an appt with Caridad Cotaerd this coming Monday 12/6. Patient encouraged to keep this follow up.      As always patient was asked to remain on campus for 15 minutes after infusion. Patient reported feeling well when leaving clinic.      Will follow-up in 1 month for next injection. Pt will be returning at the beginning of the month in January versus when dose due at the end of December due to transportation and need for patient to get out at the beginning of the month when patient has transportation.       Lavinia Tristan. Dillan, PharmD  12/02/21  16:23 EST

## 2021-12-02 NOTE — PROGRESS NOTES
"Pharmacy Note  Medication:  Repatha   Dose: 420mg  Referring Provider: Caridad Cassidy  Start Date: 12/16/19  Last Injection: 12/2/21      Diagnosis Details:   Hyperlipidemia with statin allergy        Past Medical History:   Diagnosis Date   • HTN (hypertension)      Social History     Socioeconomic History   • Marital status:    Tobacco Use   • Smoking status: Never Smoker   • Smokeless tobacco: Never Used   Substance and Sexual Activity   • Alcohol use: No   • Drug use: No   • Sexual activity: Defer     Allergies   Allergen Reactions   • Atorvastatin Hives and Rash     Pt states atorvastatin make \"itchy, red bumps appear all over his body\"   • Penicillins      Current Outpatient Medications   Medication Sig Dispense Refill   • albuterol sulfate  (90 Base) MCG/ACT inhaler Inhale 2 puffs Every 4 (Four) Hours As Needed for Wheezing. 8 g 1   • Evolocumab with Infusor (Repatha Pushtronex System) solution cartridge Inject 420 mg under the skin into the appropriate area as directed Every 28 (Twenty-Eight) Days. 3.5 mL 5   • Flaxseed, Linseed, (FLAXSEED OIL) 1000 MG capsule Take  by mouth.     • metoprolol tartrate (LOPRESSOR) 50 MG tablet Take 1 tablet by mouth twice daily (Patient taking differently: Take 50 mg by mouth Daily. Pt reports decreasing to once daily himself) 60 tablet 0     No current facility-administered medications for this visit.       Labs    2/4/20 LDL 52     Assessment     Drug Dates Notes   Current Lipid-lowering Thearpy Repatha 420mg monthly  Dec 2019-present      Previous Lipid-lowering Therapy atorvastatin  Allergic reaction    zetia 10mg 12/19/18-12/2/19 Discontinued by another provider                   Pt was assessed today for PCSK9 inhibitor therapy.  Patient has diagnosis of hyperlipidemia and statin intolerance. Pt has been taking Repatha 420 mg every 28 days. Most recent LDL is 52 while taking Repatha on 2/4/20. Patient visit with the referring cardiologist scheduled for " 12/6/21.      Plan    Placed Repatha 420mg on LEFT arm. Pt denies any issues or adverse effects and reports he is still tolerating injection well. Pt will RTC in 1 month for next injection.  Pt still needs lipid panel. Spoke with patient today about it and he reports that he will go get one but hasn't yet. Advised of importance of getting Lipid Panel before next injection and encouraged to go Monday morning before going to see Caridad so he was not having to try and make extra trips to [place]. Patient also cancelled last cardiology appt in November but does have an appt with Caridad Cotaerd this coming Monday 12/6. Patient encouraged to keep this follow up.      As always patient was asked to remain on campus for 15 minutes after infusion. Patient reported feeling well when leaving clinic.      Will follow-up in 1 month for next injection. Pt will be returning at the beginning of the month in January versus when dose due at the end of December due to transportation and need for patient to get out at the beginning of the month when patient has transportation.       Lavinia Tristan. Dillan, PharmD  12/02/21  15:23 EST

## 2021-12-06 ENCOUNTER — OFFICE VISIT (OUTPATIENT)
Dept: CARDIOLOGY | Facility: CLINIC | Age: 56
End: 2021-12-06

## 2021-12-06 VITALS
OXYGEN SATURATION: 95 % | DIASTOLIC BLOOD PRESSURE: 79 MMHG | SYSTOLIC BLOOD PRESSURE: 164 MMHG | BODY MASS INDEX: 34.27 KG/M2 | HEIGHT: 72 IN | WEIGHT: 253 LBS | HEART RATE: 94 BPM | TEMPERATURE: 96 F

## 2021-12-06 DIAGNOSIS — I10 ESSENTIAL HYPERTENSION: Primary | ICD-10-CM

## 2021-12-06 DIAGNOSIS — E78.5 DYSLIPIDEMIA: ICD-10-CM

## 2021-12-06 PROCEDURE — 93000 ELECTROCARDIOGRAM COMPLETE: CPT | Performed by: NURSE PRACTITIONER

## 2021-12-06 PROCEDURE — 99214 OFFICE O/P EST MOD 30 MIN: CPT | Performed by: NURSE PRACTITIONER

## 2021-12-06 RX ORDER — METOPROLOL SUCCINATE 50 MG/1
50 TABLET, EXTENDED RELEASE ORAL DAILY
Qty: 30 TABLET | Refills: 11 | Status: SHIPPED | OUTPATIENT
Start: 2021-12-06 | End: 2023-01-05 | Stop reason: SDUPTHER

## 2021-12-06 RX ORDER — EVOLOCUMAB 420 MG/3.5
420 KIT SUBCUTANEOUS
Qty: 3.5 ML | Refills: 5 | Status: SHIPPED | OUTPATIENT
Start: 2021-12-06 | End: 2022-02-28 | Stop reason: SDUPTHER

## 2021-12-06 NOTE — PROGRESS NOTES
"Oliverio Escobar MD  Sánchez Chandler  1965 12/06/2021    Patient Active Problem List   Diagnosis   • Essential hypertension   • History of chest pain   • Dyslipidemia       Dear Oliverio Escobar MD:    Subjective     Chief Complaint   Patient presents with   • Med Management     bottles.            History of Present Illness:    Sánchez Chandler is a 56 y.o. male with a past medical history of hypertension and dyslipidemia. He presents today for cardiology follow up. The patient denies chest pain, shortness of breath, palpitations, dizziness, lightheadedness, near syncope, and syncope.  He has been taking Repatha. He has not completed labs previously ordered. He is tolerating the medication well. His BP is elevated today, he doesn't check this at home. He takes metoprolol tartrate, but only takes it once a day because the second pill makes him dizzy. However, he is opposed to trying different medication.        Allergies   Allergen Reactions   • Atorvastatin Hives and Rash     Pt states atorvastatin make \"itchy, red bumps appear all over his body\"   • Penicillins    :      Current Outpatient Medications:   •  albuterol sulfate  (90 Base) MCG/ACT inhaler, Inhale 2 puffs Every 4 (Four) Hours As Needed for Wheezing., Disp: 8 g, Rfl: 1  •  Flaxseed, Linseed, (FLAXSEED OIL) 1000 MG capsule, Take  by mouth., Disp: , Rfl:   •  Evolocumab with Infusor (Repatha Pushtronex System) solution cartridge, Inject 420 mg under the skin into the appropriate area as directed Every 28 (Twenty-Eight) Days., Disp: 3.5 mL, Rfl: 5  •  metoprolol succinate XL (TOPROL-XL) 50 MG 24 hr tablet, Take 1 tablet by mouth Daily., Disp: 30 tablet, Rfl: 11      The following portions of the patient's history were reviewed and updated as appropriate: allergies, current medications, past family history, past medical history, past social history, past surgical history and problem list.    Social History     Tobacco Use   • " "Smoking status: Never Smoker   • Smokeless tobacco: Never Used   Substance Use Topics   • Alcohol use: No   • Drug use: No       ROS    Objective   Vitals:    12/06/21 0902   BP: 164/79   BP Location: Left arm   Patient Position: Sitting   Cuff Size: Adult   Pulse: 94   Temp: 96 °F (35.6 °C)   TempSrc: Infrared   SpO2: 95%   Weight: 115 kg (253 lb)   Height: 182.9 cm (72\")     Body mass index is 34.31 kg/m².        Vitals reviewed.   Constitutional:       Appearance: Healthy appearance. Well-developed and not in distress.   HENT:      Head: Normocephalic and atraumatic.   Pulmonary:      Effort: Pulmonary effort is normal.      Breath sounds: Normal breath sounds. No wheezing. No rales.   Cardiovascular:      Normal rate. Regular rhythm.      Murmurs: There is no murmur.      . No S3 and S4 gallop.   Edema:     Peripheral edema absent.   Abdominal:      General: Bowel sounds are normal.      Palpations: Abdomen is soft.   Skin:     General: Skin is warm and dry.   Neurological:      Mental Status: Alert, oriented to person, place, and time and oriented to person, place and time.   Psychiatric:         Mood and Affect: Mood normal.         Behavior: Behavior normal.         Lab Results   Component Value Date     (L) 12/20/2020    K 4.6 12/20/2020     (H) 12/20/2020    CO2 18.0 (L) 12/20/2020    BUN 24 (H) 12/20/2020    CREATININE 1.20 12/20/2020    GLUCOSE 97 12/20/2020    CALCIUM 8.9 12/20/2020    AST 40 12/20/2020    ALT 48 (H) 12/20/2020    ALKPHOS 65 12/20/2020       Lab Results   Component Value Date    TRIG 139 02/04/2020    HDL 28 (L) 02/04/2020    LDL 52 02/04/2020      No results found for: BNP          ECG 12 Lead    Date/Time: 12/6/2021 8:53 AM  Performed by: Caridad Cassidy APRN  Authorized by: Caridad Cassidy APRN   Comparison: compared with previous ECG   Similar to previous ECG  Rhythm: sinus rhythm  BPM: 85  Conduction: right bundle branch block              Assessment/Plan    " Diagnosis Plan   1. Essential hypertension  ECG 12 Lead    metoprolol succinate XL (TOPROL-XL) 50 MG 24 hr tablet   2. Dyslipidemia  ECG 12 Lead    Evolocumab with Infusor (Repatha Pushtronex System) solution cartridge    Comprehensive Metabolic Panel    Lipid Panel                Recommendations:    1. Essential Hypertension - BP elevated in the office today, he has not been taking metoprolol correctly. He will not try a different medication. He is agreeable to change metoprolol tartrate to metoprolol succinate.  2. Dyslipidemia - CMP and lipid panel ordered. I asked him to complete the labs this week, he was agreeable. Will continue Repatha.  3. Follow up in 6-7 months or sooner if needed.         Return in about 7 months (around 7/6/2022) for Recheck.    As always, I appreciate very much the opportunity to participate in the cardiovascular care of your patients.      With Best Regards,    JULIÁN Paz

## 2021-12-09 ENCOUNTER — APPOINTMENT (OUTPATIENT)
Dept: PHARMACY | Facility: HOSPITAL | Age: 56
End: 2021-12-09

## 2021-12-14 ENCOUNTER — LAB (OUTPATIENT)
Dept: LAB | Facility: HOSPITAL | Age: 56
End: 2021-12-14

## 2021-12-14 DIAGNOSIS — E78.5 DYSLIPIDEMIA: ICD-10-CM

## 2021-12-14 LAB
ALBUMIN SERPL-MCNC: 4.38 G/DL (ref 3.5–5.2)
ALBUMIN/GLOB SERPL: 1.6 G/DL
ALP SERPL-CCNC: 63 U/L (ref 39–117)
ALT SERPL W P-5'-P-CCNC: 28 U/L (ref 1–41)
ANION GAP SERPL CALCULATED.3IONS-SCNC: 11.2 MMOL/L (ref 5–15)
AST SERPL-CCNC: 20 U/L (ref 1–40)
BILIRUB SERPL-MCNC: 0.8 MG/DL (ref 0–1.2)
BUN SERPL-MCNC: 20 MG/DL (ref 6–20)
BUN/CREAT SERPL: 16.7 (ref 7–25)
CALCIUM SPEC-SCNC: 9.4 MG/DL (ref 8.6–10.5)
CHLORIDE SERPL-SCNC: 106 MMOL/L (ref 98–107)
CHOLEST SERPL-MCNC: 116 MG/DL (ref 0–200)
CO2 SERPL-SCNC: 23.8 MMOL/L (ref 22–29)
CREAT SERPL-MCNC: 1.2 MG/DL (ref 0.76–1.27)
GFR SERPL CREATININE-BSD FRML MDRD: 63 ML/MIN/1.73
GLOBULIN UR ELPH-MCNC: 2.7 GM/DL
GLUCOSE SERPL-MCNC: 100 MG/DL (ref 65–99)
HDLC SERPL-MCNC: 34 MG/DL (ref 40–60)
LDLC SERPL CALC-MCNC: 64 MG/DL (ref 0–100)
LDLC/HDLC SERPL: 1.86 {RATIO}
POTASSIUM SERPL-SCNC: 4.2 MMOL/L (ref 3.5–5.2)
PROT SERPL-MCNC: 7.1 G/DL (ref 6–8.5)
SODIUM SERPL-SCNC: 141 MMOL/L (ref 136–145)
TRIGL SERPL-MCNC: 94 MG/DL (ref 0–150)
VLDLC SERPL-MCNC: 18 MG/DL (ref 5–40)

## 2021-12-14 PROCEDURE — 80061 LIPID PANEL: CPT

## 2021-12-14 PROCEDURE — 80053 COMPREHEN METABOLIC PANEL: CPT

## 2021-12-14 PROCEDURE — 36415 COLL VENOUS BLD VENIPUNCTURE: CPT

## 2022-01-03 ENCOUNTER — DISEASE STATE MANAGEMENT VISIT (OUTPATIENT)
Dept: PHARMACY | Facility: HOSPITAL | Age: 57
End: 2022-01-03

## 2022-01-03 ENCOUNTER — SPECIALTY PHARMACY (OUTPATIENT)
Dept: PHARMACY | Facility: HOSPITAL | Age: 57
End: 2022-01-03

## 2022-01-03 NOTE — PROGRESS NOTES
"Pharmacy Note  Medication:  Repatha   Dose: 420mg  Referring Provider: Caridad Cassidy  Start Date: 12/16/19  Last Injection: 1/3/22      Diagnosis Details:   Hyperlipidemia with statin allergy        Past Medical History:   Diagnosis Date   • HTN (hypertension)      Social History     Socioeconomic History   • Marital status:    Tobacco Use   • Smoking status: Never Smoker   • Smokeless tobacco: Never Used   Substance and Sexual Activity   • Alcohol use: No   • Drug use: No   • Sexual activity: Defer     Allergies   Allergen Reactions   • Atorvastatin Hives and Rash     Pt states atorvastatin make \"itchy, red bumps appear all over his body\"   • Penicillins      Current Outpatient Medications   Medication Sig Dispense Refill   • albuterol sulfate  (90 Base) MCG/ACT inhaler Inhale 2 puffs Every 4 (Four) Hours As Needed for Wheezing. 8 g 1   • Evolocumab with Infusor (Repatha Pushtronex System) solution cartridge Inject 420 mg under the skin into the appropriate area as directed Every 28 (Twenty-Eight) Days. 3.5 mL 5   • Evolocumab with Infusor (Repatha Pushtronex System) solution cartridge Inject 420 mg under the skin into the appropriate area as directed Every 28 (Twenty-Eight) Days. 3.5 mL 5   • Evolocumab with Infusor (Repatha Pushtronex System) solution cartridge Inject 420 mg under the skin into the appropriate area as directed Every 28 (Twenty-Eight) Days. 3.5 mL 5   • Flaxseed, Linseed, (FLAXSEED OIL) 1000 MG capsule Take  by mouth.     • metoprolol succinate XL (TOPROL-XL) 50 MG 24 hr tablet Take 1 tablet by mouth Daily. 30 tablet 11     No current facility-administered medications for this visit.       Labs    2/4/20 LDL 52     Assessment     Drug Dates Notes   Current Lipid-lowering Thearpy Repatha 420mg monthly  Dec 2019-present      Previous Lipid-lowering Therapy atorvastatin  Allergic reaction    zetia 10mg 12/19/18-12/2/19 Discontinued by another provider                   Pt was " assessed today for PCSK9 inhibitor therapy.  Patient has diagnosis of hyperlipidemia and statin intolerance. Pt has been taking Repatha 420 mg every 28 days. Most recent LDL is 52 while taking Repatha on 2/4/20. Patient visit with the referring cardiologist scheduled for 12/6/21.      Plan    Placed Repatha 420mg on LEFT arm. Pt denies any issues or adverse effects and reports he is still tolerating injection well. Pt will RTC in 1 month for next injection.  Pt still needs lipid panel. Spoke with patient today about it and he reports that he will go get one but hasn't yet. Advised of importance of getting Lipid Panel before next injection and encouraged to go Monday morning before going to see Caridad so he was not having to try and make extra trips to [place]. Patient also cancelled last cardiology appt in November but does have an appt with Caridad Cotaerd this coming Monday 12/6. Patient encouraged to keep this follow up.      As always patient was asked to remain on campus for 15 minutes after infusion. Patient reported feeling well when leaving clinic.      Will follow-up in 1 month for next injection. Pt will be returning at the beginning of the month in January versus when dose due at the end of December due to transportation and need for patient to get out at the beginning of the month when patient has transportation.       Lavinia Tristan. Dillan, PharmD  01/03/22  09:50 EST

## 2022-01-03 NOTE — PROGRESS NOTES
"Pharmacy Note  Medication:  Repatha   Dose: 420mg  Referring Provider: Caridad Cassidy  Start Date: 12/16/19  Last Injection: 12/2/21      Diagnosis Details:   Hyperlipidemia with statin allergy        Past Medical History:   Diagnosis Date   • HTN (hypertension)      Social History     Socioeconomic History   • Marital status:    Tobacco Use   • Smoking status: Never Smoker   • Smokeless tobacco: Never Used   Substance and Sexual Activity   • Alcohol use: No   • Drug use: No   • Sexual activity: Defer     Allergies   Allergen Reactions   • Atorvastatin Hives and Rash     Pt states atorvastatin make \"itchy, red bumps appear all over his body\"   • Penicillins      Current Outpatient Medications   Medication Sig Dispense Refill   • albuterol sulfate  (90 Base) MCG/ACT inhaler Inhale 2 puffs Every 4 (Four) Hours As Needed for Wheezing. 8 g 1   • Evolocumab with Infusor (Repatha Pushtronex System) solution cartridge Inject 420 mg under the skin into the appropriate area as directed Every 28 (Twenty-Eight) Days. 3.5 mL 5   • Evolocumab with Infusor (Repatha Pushtronex System) solution cartridge Inject 420 mg under the skin into the appropriate area as directed Every 28 (Twenty-Eight) Days. 3.5 mL 5   • Evolocumab with Infusor (Repatha Pushtronex System) solution cartridge Inject 420 mg under the skin into the appropriate area as directed Every 28 (Twenty-Eight) Days. 3.5 mL 5   • Flaxseed, Linseed, (FLAXSEED OIL) 1000 MG capsule Take  by mouth.     • metoprolol succinate XL (TOPROL-XL) 50 MG 24 hr tablet Take 1 tablet by mouth Daily. 30 tablet 11     No current facility-administered medications for this visit.       Labs    1/3/22 LDL 64    Assessment     Drug Dates Notes   Current Lipid-lowering Thearpy Repatha 420mg monthly  Dec 2019-present      Previous Lipid-lowering Therapy atorvastatin  Allergic reaction    zetia 10mg 12/19/18-12/2/19 Discontinued by another provider                   Pt was " assessed today for PCSK9 inhibitor therapy.  Patient has diagnosis of hyperlipidemia and statin intolerance. Pt has been taking Repatha 420 mg every 28 days. Most recent LDL is 64 while taking Repatha on 12/14/21. Patient last visit with cardiology was on 12/6/21. The appointment notes had to continue Repatha ( cancelled Rx at [facility] sent by cardiology and re-activated Rx that speciality clinic is filling via [facility]).  While LDL is at goal, patients LDL has slightly increased from labs 1 year ago. Continue to monitor every 6 months and re-assess if LDL increases above goal.       Plan    Placed Repatha 420mg on RIGHT arm. Pt denies any issues or adverse effects and reports he is still tolerating injection well. Pt will RTC in 1 month for next injection.  Pt is currently up to date with lipid panel and cardiology appointments.     As always patient was asked to remain on campus for 15 minutes after infusion. Patient reported feeling well when leaving clinic.      Will follow-up in 1 month for next injection.        Lavinia Tristan. Dillan, PharmD  01/03/22  09:03 EST

## 2022-01-31 ENCOUNTER — SPECIALTY PHARMACY (OUTPATIENT)
Dept: PHARMACY | Facility: HOSPITAL | Age: 57
End: 2022-01-31

## 2022-01-31 ENCOUNTER — DISEASE STATE MANAGEMENT VISIT (OUTPATIENT)
Dept: PHARMACY | Facility: HOSPITAL | Age: 57
End: 2022-01-31

## 2022-01-31 NOTE — PROGRESS NOTES
"Pharmacy Note  Medication:  Repatha   Dose: 420mg  Referring Provider: Caridad Cassidy  Start Date: 12/16/19  Last Injection: 1/3/22      Diagnosis Details:   Hyperlipidemia with statin allergy        Past Medical History:   Diagnosis Date   • HTN (hypertension)      Social History     Socioeconomic History   • Marital status:    Tobacco Use   • Smoking status: Never Smoker   • Smokeless tobacco: Never Used   Substance and Sexual Activity   • Alcohol use: No   • Drug use: No   • Sexual activity: Defer     Allergies   Allergen Reactions   • Atorvastatin Hives and Rash     Pt states atorvastatin make \"itchy, red bumps appear all over his body\"   • Penicillins      Current Outpatient Medications   Medication Sig Dispense Refill   • albuterol sulfate  (90 Base) MCG/ACT inhaler Inhale 2 puffs Every 4 (Four) Hours As Needed for Wheezing. 8 g 1   • Evolocumab with Infusor (Repatha Pushtronex System) solution cartridge Inject 420 mg under the skin into the appropriate area as directed Every 28 (Twenty-Eight) Days. 3.5 mL 5   • Evolocumab with Infusor (Repatha Pushtronex System) solution cartridge Inject 420 mg under the skin into the appropriate area as directed Every 28 (Twenty-Eight) Days. 3.5 mL 5   • Evolocumab with Infusor (Repatha Pushtronex System) solution cartridge Inject 420 mg under the skin into the appropriate area as directed Every 28 (Twenty-Eight) Days. 3.5 mL 5   • Flaxseed, Linseed, (FLAXSEED OIL) 1000 MG capsule Take  by mouth.     • metoprolol succinate XL (TOPROL-XL) 50 MG 24 hr tablet Take 1 tablet by mouth Daily. 30 tablet 11     No current facility-administered medications for this visit.       Labs    12/14/21 LDL 64    Assessment     Drug Dates Notes   Current Lipid-lowering Thearpy Repatha 420mg monthly  Dec 2019-present      Previous Lipid-lowering Therapy atorvastatin  Allergic reaction    zetia 10mg 12/19/18-12/2/19 Discontinued by another provider                   Pt was " assessed today for PCSK9 inhibitor therapy.  Patient has diagnosis of hyperlipidemia and statin intolerance. Pt has been taking Repatha 420 mg every 28 days. Most recent LDL is 64 while taking Repatha on 12/14/21. Patient last visit with cardiology was on 12/6/21. The appointment notes had to continue Repatha. While LDL is at goal, patients LDL has slightly increased from labs 1 year ago. Continue to monitor every 6 months and re-assess if LDL increases above goal.       Patti Washington, MarthaD Resident, Placed Repatha 420mg on LEFT arm. Pt denies any issues or adverse effects and reports he is still tolerating injection well. Pt will RTC in 1 month for next injection.  Pt is currently up to date with lipid panel and cardiology appointments.     As always patient was asked to remain on campus for 15 minutes after infusion. Patient reported feeling well when leaving clinic.        Will follow-up in 1 month for next injection.        Lavinia Tristan. Dillan, PharmD  01/31/22  08:22 EST

## 2022-01-31 NOTE — PROGRESS NOTES
"Pharmacy Note  Medication:  Repatha   Dose: 420mg  Referring Provider: Caridad Cassidy  Start Date: 12/16/19  Last Injection: 1/3/22      Diagnosis Details:   Hyperlipidemia with statin allergy        Past Medical History:   Diagnosis Date   • HTN (hypertension)      Social History     Socioeconomic History   • Marital status:    Tobacco Use   • Smoking status: Never Smoker   • Smokeless tobacco: Never Used   Substance and Sexual Activity   • Alcohol use: No   • Drug use: No   • Sexual activity: Defer     Allergies   Allergen Reactions   • Atorvastatin Hives and Rash     Pt states atorvastatin make \"itchy, red bumps appear all over his body\"   • Penicillins      Current Outpatient Medications   Medication Sig Dispense Refill   • albuterol sulfate  (90 Base) MCG/ACT inhaler Inhale 2 puffs Every 4 (Four) Hours As Needed for Wheezing. 8 g 1   • Evolocumab with Infusor (Repatha Pushtronex System) solution cartridge Inject 420 mg under the skin into the appropriate area as directed Every 28 (Twenty-Eight) Days. 3.5 mL 5   • Evolocumab with Infusor (Repatha Pushtronex System) solution cartridge Inject 420 mg under the skin into the appropriate area as directed Every 28 (Twenty-Eight) Days. 3.5 mL 5   • Evolocumab with Infusor (Repatha Pushtronex System) solution cartridge Inject 420 mg under the skin into the appropriate area as directed Every 28 (Twenty-Eight) Days. 3.5 mL 5   • Flaxseed, Linseed, (FLAXSEED OIL) 1000 MG capsule Take 1 capsule by mouth Daily.     • metoprolol succinate XL (TOPROL-XL) 50 MG 24 hr tablet Take 1 tablet by mouth Daily. 30 tablet 11     No current facility-administered medications for this visit.       Labs    12/14/21 LDL 64    Assessment     Drug Dates Notes   Current Lipid-lowering Thearpy Repatha 420mg monthly  Dec 2019-present      Previous Lipid-lowering Therapy atorvastatin  Allergic reaction    zetia 10mg 12/19/18-12/2/19 Discontinued by another provider             "       Pt was assessed today for PCSK9 inhibitor therapy.  Patient has diagnosis of hyperlipidemia and statin intolerance. Pt has been taking Repatha 420 mg every 28 days. Most recent LDL is 64 while taking Repatha on 12/14/21. Patient last visit with cardiology was on 12/6/21. The appointment notes had to continue Repatha. While LDL is at goal, patients LDL has slightly increased from labs 1 year ago. Continue to monitor every 6 months and re-assess if LDL increases above goal.       Patti Washington, MarthaD Resident, Placed Repatha 420mg on LEFT arm. Pt denies any issues or adverse effects and reports he is still tolerating injection well. Pt will RTC in 1 month for next injection.  Pt is currently up to date with lipid panel and cardiology appointments.     As always patient was asked to remain on campus for 15 minutes after infusion. Patient reported feeling well when leaving clinic.        Will follow-up in 1 month for next injection.        Lavinia Tristan. Dillan, MarthaD  01/31/22  08:48 EST

## 2022-02-28 ENCOUNTER — SPECIALTY PHARMACY (OUTPATIENT)
Dept: PHARMACY | Facility: HOSPITAL | Age: 57
End: 2022-02-28

## 2022-02-28 ENCOUNTER — DISEASE STATE MANAGEMENT VISIT (OUTPATIENT)
Dept: PHARMACY | Facility: HOSPITAL | Age: 57
End: 2022-02-28

## 2022-02-28 NOTE — PROGRESS NOTES
"Pharmacy Note  Medication:  Repatha   Dose: 420mg  Referring Provider: Caridad Cassidy  Start Date: 12/16/19  Last Injection: 1/31/22    Diagnosis Details:   Hyperlipidemia with statin allergy      Past Medical History:   Diagnosis Date   • HTN (hypertension)      Social History     Socioeconomic History   • Marital status:    Tobacco Use   • Smoking status: Never Smoker   • Smokeless tobacco: Never Used   Substance and Sexual Activity   • Alcohol use: No   • Drug use: No   • Sexual activity: Defer     Allergies   Allergen Reactions   • Atorvastatin Hives and Rash     Pt states atorvastatin make \"itchy, red bumps appear all over his body\"   • Penicillins      Current Outpatient Medications   Medication Sig Dispense Refill   • albuterol sulfate  (90 Base) MCG/ACT inhaler Inhale 2 puffs Every 4 (Four) Hours As Needed for Wheezing. 8 g 1   • Flaxseed, Linseed, (FLAXSEED OIL) 1000 MG capsule Take 1 capsule by mouth Daily.     • metoprolol succinate XL (TOPROL-XL) 50 MG 24 hr tablet Take 1 tablet by mouth Daily. 30 tablet 11   • Evolocumab with Infusor (Repatha Pushtronex System) solution cartridge Inject 420 mg under the skin into the appropriate area as directed Every 28 (Twenty-Eight) Days. 3.5 mL 5     No current facility-administered medications for this visit.       Labs    12/14/21 LDL 64    Assessment     Drug Dates Notes   Current Lipid-lowering Thearpy Repatha 420mg monthly  Dec 2019-present      Previous Lipid-lowering Therapy atorvastatin  Allergic reaction    zetia 10mg 12/19/18-12/2/19 Discontinued by another provider                   Pt was assessed today for PCSK9 inhibitor therapy.  Patient has diagnosis of hyperlipidemia and statin intolerance. Pt has been taking Repatha 420 mg every 28 days. Most recent LDL is 64 while taking Repatha on 12/14/21. Patient last visit with cardiology was on 12/6/21. The appointment notes had to continue Repatha.     He has not missed any doses and no " problem with the injection administration since he gets in our clinic.     Plan    Placed Repatha 420mg on RIGHT arm. Pt denies any issues or adverse effects and reports he is still tolerating injection well. Pt will RTC in 1 month for next injection.  Pt is currently up to date with lipid panel and cardiology appointments.     As always patient was asked to remain on campus for 15 minutes after infusion. Patient reported feeling well when leaving clinic.     Will follow-up in 1 month for next injection.    Alma Rosa Serna, PharmD  02/28/22  08:58 EST

## 2022-02-28 NOTE — PROGRESS NOTES
"Pharmacy Note  Medication:  Repatha   Dose: 420mg  Referring Provider: Caridad Cassidy  Start Date: 12/16/19  Last Injection: 1/31/22    Diagnosis Details:   Hyperlipidemia with statin allergy      Past Medical History:   Diagnosis Date   • HTN (hypertension)      Social History     Socioeconomic History   • Marital status:    Tobacco Use   • Smoking status: Never Smoker   • Smokeless tobacco: Never Used   Substance and Sexual Activity   • Alcohol use: No   • Drug use: No   • Sexual activity: Defer     Allergies   Allergen Reactions   • Atorvastatin Hives and Rash     Pt states atorvastatin make \"itchy, red bumps appear all over his body\"   • Penicillins      Current Outpatient Medications   Medication Sig Dispense Refill   • albuterol sulfate  (90 Base) MCG/ACT inhaler Inhale 2 puffs Every 4 (Four) Hours As Needed for Wheezing. 8 g 1   • Evolocumab with Infusor (Repatha Pushtronex System) solution cartridge Inject 420 mg under the skin into the appropriate area as directed Every 28 (Twenty-Eight) Days. 3.5 mL 5   • Flaxseed, Linseed, (FLAXSEED OIL) 1000 MG capsule Take 1 capsule by mouth Daily.     • metoprolol succinate XL (TOPROL-XL) 50 MG 24 hr tablet Take 1 tablet by mouth Daily. 30 tablet 11     No current facility-administered medications for this visit.       Labs    12/14/21 LDL 64    Assessment     Drug Dates Notes   Current Lipid-lowering Thearpy Repatha 420mg monthly  Dec 2019-present      Previous Lipid-lowering Therapy atorvastatin  Allergic reaction    zetia 10mg 12/19/18-12/2/19 Discontinued by another provider                   Pt was assessed today for PCSK9 inhibitor therapy.  Patient has diagnosis of hyperlipidemia and statin intolerance. Pt has been taking Repatha 420 mg every 28 days. Most recent LDL is 64 while taking Repatha on 12/14/21. Patient last visit with cardiology was on 12/6/21. The appointment notes had to continue Repatha.     He has not missed any doses and no " problem with the injection administration since he gets in our clinic.     Plan    Placed Repatha 420mg on RIGHT arm. Pt denies any issues or adverse effects and reports he is still tolerating injection well. Pt will RTC in 1 month for next injection.  Pt is currently up to date with lipid panel and cardiology appointments.     As always patient was asked to remain on campus for 15 minutes after infusion. Patient reported feeling well when leaving clinic.     Will follow-up in 1 month for next injection.    Alma Rosa Serna, PharmD  02/28/22  09:01 EST

## 2022-03-28 ENCOUNTER — SPECIALTY PHARMACY (OUTPATIENT)
Dept: PHARMACY | Facility: HOSPITAL | Age: 57
End: 2022-03-28

## 2022-03-28 ENCOUNTER — DISEASE STATE MANAGEMENT VISIT (OUTPATIENT)
Dept: PHARMACY | Facility: HOSPITAL | Age: 57
End: 2022-03-28

## 2022-03-28 NOTE — PROGRESS NOTES
[facility]  Lipid Management Program - PCSK9i       Sánchez Chandler is a 56 y.o. male referred to the [facility] by Caridad Cassidy for clinical pharmacy and specialty pharmacy management of PCSK9i.  Sánchez Chandler is  treated for hyperlipidemia and currently does not take anything other than Repatha for his cholesterol.  In the past, Pt has tried Lipitor and Zetia and had allergic reaction of hives/rash with Lipitor. The patient denies any allergies to latex.      Relevant Past Medical History and Comorbidities  Past Medical History:   Diagnosis Date   • HTN (hypertension)      Social History     Socioeconomic History   • Marital status:    Tobacco Use   • Smoking status: Never Smoker   • Smokeless tobacco: Never Used   Substance and Sexual Activity   • Alcohol use: No   • Drug use: No   • Sexual activity: Defer       Allergies  Atorvastatin and Penicillins    Current Medication List    Current Outpatient Medications:   •  albuterol sulfate  (90 Base) MCG/ACT inhaler, Inhale 2 puffs Every 4 (Four) Hours As Needed for Wheezing., Disp: 8 g, Rfl: 1  •  Evolocumab with Infusor (Repatha Pushtronex System) solution cartridge, Inject 420 mg under the skin into the appropriate area as directed Every 28 (Twenty-Eight) Days., Disp: 3.5 mL, Rfl: 5  •  Flaxseed, Linseed, (FLAXSEED OIL) 1000 MG capsule, Take 1 capsule by mouth Daily., Disp: , Rfl:   •  metoprolol succinate XL (TOPROL-XL) 50 MG 24 hr tablet, Take 1 tablet by mouth Daily., Disp: 30 tablet, Rfl: 11    Drug Interactions  No known drug drug interactions with Repatha expected according to literature     Relevant Laboratory Values  Lab Results   Component Value Date    CHOL 116 12/14/2021    TRIG 94 12/14/2021    HDL 34 (L) 12/14/2021    LDL 64 12/14/2021       Medication Assessment & Plan    Will begin the prior authorization, if applicable.  Will see patient in clinic once prior authorization is obtained to  order medication, provide injection training and medication counseling and follow-up with patient as necessary.          Initial Education Provided for Praluent/Repatha    Patient seen in the [facility] for initial education and injection training for PCSK9 inhibitors. The patient was introduced to services offered by [facility], including: regular assessments, refill coordination, curbside pick-up or mail order delivery options, prior authorization maintenance, and financial assistance programs as applicable. The patient was also provided with contact information for the pharmacy team. Welcome information and patient satisfaction survey to be sent by retail team with patient's initial fill.    Patient Instructions    Repatha  is used to lower LDL, or bad cholesterol, to help reduce your chance of a heart attack or stroke.  You should give your injection once every 28 days.  Your doctor will likely keep you on this medication indefinitely as long as it is working for you and not causing any adverse effects.      This medication should be kept in the refrigerator until you are ready to use it.  Once removed from the refrigerator, it is good at room temperature for 30 days.  Do not leave in your car or expose to extreme heat.  Do not shake or freeze.  Dispose the used syringe/device in a sharps container.     This injection is a subcutaneous injection, which means just under the skin.  It is important to choose an area of your skin that is not tender, bruised, cut or has scars or stretch marks.  You can inject into your abdomen (except for 2 inches around your navel), your thigh or your upper arm.  Do not administer with other drugs.   Rotate injection sites each time you give the injection. Wash your hands prior to giving yourself an injection and use an alcohol wipe to clean the area of the injection and allow to dry prior to injecting.      If you miss a dose, take it as  soon as you remember and resume the original schedule if it is within 7 days from the missed dose.  If an every 2 week dose is not administered within 7 days, wait until the next dose on the original schedule.  If a once-monthly dose is not administered within 7 days, administer the dose and start a new schedule based on this date.     Be sure to let your doctor or pharmacist know of any medication changes, including OTC and herbal supplements.     Adverse Effects  Reviewed with patient and education on management provided.     • Sore Throat  • Injection site pain  • Itching or irritation   • Flu-like symptoms  • Signs of an allergic reaction     Immunizations  While there are no immunizations that you need to get specifically because you are on this drug, it is important to keep up with your recommended routine vaccinations.     Adherence and Self-Administration    • Barriers to Patient Adherence and/or Self-Administration: None  • Methods for Supporting Patient Adherence and/or Self-Administration: Pt receives injection in clinic monthly      Goals of Therapy  • Patient Goals of Therapy: Adherence to medication   • Clinical Goals or Therapeutic Targets, If Applicable: LDL Reduction      Attestation  I attest that the initiated specialty medication(s) are appropriate for the patient based on my assessment.  If the prescribed therapy is at any point deemed not appropriate based on the current or future assessments, a consultation will be initiated with the patient's specialty care provider to determine the best course of action. The revised plan of therapy will be documented along with any additional patient education provided.       The patient has been provided with the following education and any applicable administration techniques (i.e. self-injection) have been demonstrated for the therapies indicated. All questions and concerns have been addressed prior to the patient receiving the medication, and the patient  has verbalized understanding of the education and any materials provided.  Additional patient education shall be provided and documented upon request by the patient, provider or payer.      The patient would like to follow-up in clinic for next injection. Care Coordinator to set up future refill outreaches, coordinate prescription delivery, and escalate clinical questions to pharmacist.     Most recent LDL is 64 while taking Repatha on 12/14/21. Patient last visit with cardiology was on 12/6/21.  Next follow up due in July. The appointment notes had to continue Repatha.     Placed Repatha 420mg on LEFT arm. Pt denies any issues or adverse effects and reports he is still tolerating injection well. Pt will RTC in 1 month for next injection.  Pt is currently up to date with lipid panel and cardiology appointments.     As always patient was asked to remain on campus for 15 minutes after infusion. Patient reported feeling well when leaving clinic.     Will follow-up in 1 month for next injection.        Thank you,     Lavinia Tristan. Dillan, PharmD  3/28/2022  09:22 EDT

## 2022-03-28 NOTE — PROGRESS NOTES
[facility]ication [facility] Clinic  Lipid Management Program - PCSK9i       Sánchez Chandler is a 56 y.o. male referred to the [facility] Clinic by Caridad Cassidy for clinical pharmacy and specialty pharmacy management of PCSK9i.  Sánchez Chandler is  treated for hyperlipidemia and currently does not take anything other than Repatha for his cholesterol.  In the past, Pt has tried Lipitor and Zetia and had allergic reaction of hives/rash with Lipitor. The patient denies any allergies to latex.      Relevant Past Medical History and Comorbidities  Past Medical History:   Diagnosis Date   • HTN (hypertension)      Social History     Socioeconomic History   • Marital status:    Tobacco Use   • Smoking status: Never Smoker   • Smokeless tobacco: Never Used   Substance and Sexual Activity   • Alcohol use: No   • Drug use: No   • Sexual activity: Defer       Allergies  Atorvastatin and Penicillins    Current Medication List    Current Outpatient Medications:   •  albuterol sulfate  (90 Base) MCG/ACT inhaler, Inhale 2 puffs Every 4 (Four) Hours As Needed for Wheezing., Disp: 8 g, Rfl: 1  •  Evolocumab with Infusor (Repatha Pushtronex System) solution cartridge, Inject 420 mg under the skin into the appropriate area as directed Every 28 (Twenty-Eight) Days., Disp: 3.5 mL, Rfl: 5  •  Flaxseed, Linseed, (FLAXSEED OIL) 1000 MG capsule, Take 1 capsule by mouth Daily., Disp: , Rfl:   •  metoprolol succinate XL (TOPROL-XL) 50 MG 24 hr tablet, Take 1 tablet by mouth Daily., Disp: 30 tablet, Rfl: 11    Drug Interactions  No known drug drug interactions with Repatha expected according to literature     Relevant Laboratory Values  Lab Results   Component Value Date    CHOL 116 12/14/2021    TRIG 94 12/14/2021    HDL 34 (L) 12/14/2021    LDL 64 12/14/2021       Medication Assessment & Plan    Will begin the prior authorization, if applicable.  Will see patient in clinic once prior authorization is obtained to  order medication, provide injection training and medication counseling and follow-up with patient as necessary.          Initial Education Provided for Praluent/Repatha    Patient seen in the [facility]ication Management Clinic for initial education and injection training for PCSK9 inhibitors. The patient was introduced to services offered by [facility], including: regular assessments, refill coordination, curbside pick-up or mail order delivery options, prior authorization maintenance, and financial assistance programs as applicable. The patient was also provided with contact information for the pharmacy team. Welcome information and patient satisfaction survey to be sent by retail team with patient's initial fill.    Patient Instructions    Repatha  is used to lower LDL, or bad cholesterol, to help reduce your chance of a heart attack or stroke.  You should give your injection once every 28 days.  Your doctor will likely keep you on this medication indefinitely as long as it is working for you and not causing any adverse effects.      This medication should be kept in the refrigerator until you are ready to use it.  Once removed from the refrigerator, it is good at room temperature for 30 days.  Do not leave in your car or expose to extreme heat.  Do not shake or freeze.  Dispose the used syringe/device in a sharps container.     This injection is a subcutaneous injection, which means just under the skin.  It is important to choose an area of your skin that is not tender, bruised, cut or has scars or stretch marks.  You can inject into your abdomen (except for 2 inches around your navel), your thigh or your upper arm.  Do not administer with other drugs.   Rotate injection sites each time you give the injection. Wash your hands prior to giving yourself an injection and use an alcohol wipe to clean the area of the injection and allow to dry prior to injecting.      If you miss a dose, take it as  soon as you remember and resume the original schedule if it is within 7 days from the missed dose.  If an every 2 week dose is not administered within 7 days, wait until the next dose on the original schedule.  If a once-monthly dose is not administered within 7 days, administer the dose and start a new schedule based on this date.     Be sure to let your doctor or pharmacist know of any medication changes, including OTC and herbal supplements.     Adverse Effects  Reviewed with patient and education on management provided.     • Sore Throat  • Injection site pain  • Itching or irritation   • Flu-like symptoms  • Signs of an allergic reaction     Immunizations  While there are no immunizations that you need to get specifically because you are on this drug, it is important to keep up with your recommended routine vaccinations.     Adherence and Self-Administration    • Barriers to Patient Adherence and/or Self-Administration: None  • Methods for Supporting Patient Adherence and/or Self-Administration: Pt receives injection in clinic monthly      Goals of Therapy  • Patient Goals of Therapy: Adherence to medication   • Clinical Goals or Therapeutic Targets, If Applicable: LDL Reduction      Attestation  I attest that the initiated specialty medication(s) are appropriate for the patient based on my assessment.  If the prescribed therapy is at any point deemed not appropriate based on the current or future assessments, a consultation will be initiated with the patient's specialty care provider to determine the best course of action. The revised plan of therapy will be documented along with any additional patient education provided.       The patient has been provided with the following education and any applicable administration techniques (i.e. self-injection) have been demonstrated for the therapies indicated. All questions and concerns have been addressed prior to the patient receiving the medication, and the patient  has verbalized understanding of the education and any materials provided.  Additional patient education shall be provided and documented upon request by the patient, provider or payer.      The patient would like to follow-up in clinic for next injection. Care Coordinator to set up future refill outreaches, coordinate prescription delivery, and escalate clinical questions to pharmacist.     Most recent LDL is 64 while taking Repatha on 12/14/21. Patient last visit with cardiology was on 12/6/21.  Next follow up due in July. The appointment notes had to continue Repatha.     Placed Repatha 420mg on LEFT arm. Pt denies any issues or adverse effects and reports he is still tolerating injection well. Pt will RTC in 1 month for next injection.  Pt is currently up to date with lipid panel and cardiology appointments.     As always patient was asked to remain on campus for 15 minutes after infusion. Patient reported feeling well when leaving clinic.     Will follow-up in 1 month for next injection.        Thank you,     Lavinia Tristan. Dillan, PharmD  3/28/2022  08:32 EDT

## 2022-04-25 ENCOUNTER — DISEASE STATE MANAGEMENT VISIT (OUTPATIENT)
Dept: PHARMACY | Facility: HOSPITAL | Age: 57
End: 2022-04-25

## 2022-04-25 ENCOUNTER — SPECIALTY PHARMACY (OUTPATIENT)
Dept: PHARMACY | Facility: HOSPITAL | Age: 57
End: 2022-04-25

## 2022-04-25 NOTE — PROGRESS NOTES
Medication Management Clinic  Lipid Management Program - PCSK9i       Sánchez Chandler is a 56 y.o. male referred to the [facility] Management Clinic by Caridad Cassidy for clinical pharmacy and specialty pharmacy management of PCSK9i.  Sánchez Chandler is  treated for hyperlipidemia and currently does not take anything other than Repatha for his cholesterol.  In the past, Pt has tried Lipitor and Zetia and had allergic reaction of hives/rash with Lipitor. The patient denies any allergies to latex.      Relevant Past Medical History and Comorbidities  Past Medical History:   Diagnosis Date   • HTN (hypertension)      Social History     Socioeconomic History   • Marital status:    Tobacco Use   • Smoking status: Never Smoker   • Smokeless tobacco: Never Used   Substance and Sexual Activity   • Alcohol use: No   • Drug use: No   • Sexual activity: Defer       Allergies  Atorvastatin and Penicillins    Current Medication List    Current Outpatient Medications:   •  albuterol sulfate  (90 Base) MCG/ACT inhaler, Inhale 2 puffs Every 4 (Four) Hours As Needed for Wheezing., Disp: 8 g, Rfl: 1  •  Evolocumab with Infusor (Repatha Pushtronex System) solution cartridge, Inject 420 mg under the skin into the appropriate area as directed Every 28 (Twenty-Eight) Days., Disp: 3.5 mL, Rfl: 5  •  Flaxseed, Linseed, (FLAXSEED OIL) 1000 MG capsule, Take 1 capsule by mouth Daily., Disp: , Rfl:   •  metoprolol succinate XL (TOPROL-XL) 50 MG 24 hr tablet, Take 1 tablet by mouth Daily., Disp: 30 tablet, Rfl: 11    Drug Interactions  No known drug drug interactions with Repatha expected according to literature     Relevant Laboratory Values  Lab Results   Component Value Date    CHOL 116 12/14/2021    TRIG 94 12/14/2021    HDL 34 (L) 12/14/2021    LDL 64 12/14/2021       Medication Assessment & Plan    Will begin the prior authorization, if applicable.  Will see patient in clinic once prior authorization is obtained to  order medication, provide injection training and medication counseling and follow-up with patient as necessary.          Initial Education Provided for Praluent/Repatha    Patient seen in the Medication Management Clinic for initial education and injection training for PCSK9 inhibitors. The patient was introduced to services offered by [facility], including: regular assessments, refill coordination, curbside pick-up or mail order delivery options, prior authorization maintenance, and financial assistance programs as applicable. The patient was also provided with contact information for the pharmacy team. Welcome information and patient satisfaction survey to be sent by retail team with patient's initial fill.    Patient Instructions    Repatha  is used to lower LDL, or bad cholesterol, to help reduce your chance of a heart attack or stroke.  You should give your injection once every 28 days.  Your doctor will likely keep you on this medication indefinitely as long as it is working for you and not causing any adverse effects.      This medication should be kept in the refrigerator until you are ready to use it.  Once removed from the refrigerator, it is good at room temperature for 30 days.  Do not leave in your car or expose to extreme heat.  Do not shake or freeze.  Dispose the used syringe/device in a sharps container.     This injection is a subcutaneous injection, which means just under the skin.  It is important to choose an area of your skin that is not tender, bruised, cut or has scars or stretch marks.  You can inject into your abdomen (except for 2 inches around your navel), your thigh or your upper arm.  Do not administer with other drugs.   Rotate injection sites each time you give the injection. Wash your hands prior to giving yourself an injection and use an alcohol wipe to clean the area of the injection and allow to dry prior to injecting.      If you miss a dose, take it as  soon as you remember and resume the original schedule if it is within 7 days from the missed dose.  If an every 2 week dose is not administered within 7 days, wait until the next dose on the original schedule.  If a once-monthly dose is not administered within 7 days, administer the dose and start a new schedule based on this date.     Be sure to let your doctor or pharmacist know of any medication changes, including OTC and herbal supplements.     Adverse Effects  Reviewed with patient and education on management provided.     • Sore Throat  • Injection site pain  • Itching or irritation   • Flu-like symptoms  • Signs of an allergic reaction     Immunizations  While there are no immunizations that you need to get specifically because you are on this drug, it is important to keep up with your recommended routine vaccinations.     Adherence and Self-Administration    • Barriers to Patient Adherence and/or Self-Administration: None  • Methods for Supporting Patient Adherence and/or Self-Administration: Pt receives injection in clinic monthly      Goals of Therapy  • Patient Goals of Therapy: Adherence to medication   • Clinical Goals or Therapeutic Targets, If Applicable: LDL Reduction      Attestation  I attest that the initiated specialty medication(s) are appropriate for the patient based on my assessment.  If the prescribed therapy is at any point deemed not appropriate based on the current or future assessments, a consultation will be initiated with the patient's specialty care provider to determine the best course of action. The revised plan of therapy will be documented along with any additional patient education provided.       The patient has been provided with the following education and any applicable administration techniques (i.e. self-injection) have been demonstrated for the therapies indicated. All questions and concerns have been addressed prior to the patient receiving the medication, and the patient  has verbalized understanding of the education and any materials provided.  Additional patient education shall be provided and documented upon request by the patient, provider or payer.      The patient would like to follow-up in clinic for next injection. Care Coordinator to set up future refill outreaches, coordinate prescription delivery, and escalate clinical questions to pharmacist.     Most recent LDL is 64 while taking Repatha on 12/14/21. Patient last visit with cardiology was on 12/6/21.  Next follow up due in July. The appointment notes had to continue Repatha.     Placed Repatha 420mg on RIGHT arm. Pt denies any issues or adverse effects and reports he is still tolerating injection well. Pt will RTC in 1 month for next injection.  Pt is currently up to date with lipid panel and cardiology appointments.     As always patient was asked to remain on campus for 15 minutes after infusion. Patient reported feeling well when leaving clinic.     Will follow-up in 1 month for next injection.        Thank you,     Lavinia Tristan. Dillan, PharmD  4/25/2022  08:22 EDT

## 2022-04-25 NOTE — PROGRESS NOTES
[facility]  Lipid Management Program - PCSK9i       Sánchez Chandler is a 56 y.o. male referred to the [facility] by Caridad Cassidy for clinical pharmacy and specialty pharmacy management of PCSK9i.  Sánchez Chandler is  treated for hyperlipidemia and currently does not take anything other than Repatha for his cholesterol.  In the past, Pt has tried Lipitor and Zetia and had allergic reaction of hives/rash with Lipitor. The patient denies any allergies to latex.      Relevant Past Medical History and Comorbidities  Past Medical History:   Diagnosis Date   • HTN (hypertension)      Social History     Socioeconomic History   • Marital status:    Tobacco Use   • Smoking status: Never Smoker   • Smokeless tobacco: Never Used   Substance and Sexual Activity   • Alcohol use: No   • Drug use: No   • Sexual activity: Defer       Allergies  Atorvastatin and Penicillins    Current Medication List    Current Outpatient Medications:   •  albuterol sulfate  (90 Base) MCG/ACT inhaler, Inhale 2 puffs Every 4 (Four) Hours As Needed for Wheezing., Disp: 8 g, Rfl: 1  •  Evolocumab with Infusor (Repatha Pushtronex System) solution cartridge, Inject 420 mg under the skin into the appropriate area as directed Every 28 (Twenty-Eight) Days., Disp: 3.5 mL, Rfl: 5  •  Flaxseed, Linseed, (FLAXSEED OIL) 1000 MG capsule, Take 1 capsule by mouth Daily., Disp: , Rfl:   •  metoprolol succinate XL (TOPROL-XL) 50 MG 24 hr tablet, Take 1 tablet by mouth Daily., Disp: 30 tablet, Rfl: 11    Drug Interactions  No known drug drug interactions with Repatha expected according to literature     Relevant Laboratory Values  Lab Results   Component Value Date    CHOL 116 12/14/2021    TRIG 94 12/14/2021    HDL 34 (L) 12/14/2021    LDL 64 12/14/2021       Medication Assessment & Plan    Will begin the prior authorization, if applicable.  Will see patient in clinic once prior authorization is obtained to  order medication, provide injection training and medication counseling and follow-up with patient as necessary.          Initial Education Provided for Praluent/Repatha    Patient seen in the [facility] Management [facility] for initial education and injection training for PCSK9 inhibitors. The patient was introduced to services offered by [facility], including: regular assessments, refill coordination, curbside pick-up or mail order delivery options, prior authorization maintenance, and financial assistance programs as applicable. The patient was also provided with contact information for the pharmacy team. Welcome information and patient satisfaction survey to be sent by retail team with patient's initial fill.    Patient Instructions    Repatha  is used to lower LDL, or bad cholesterol, to help reduce your chance of a heart attack or stroke.  You should give your injection once every 28 days.  Your doctor will likely keep you on this medication indefinitely as long as it is working for you and not causing any adverse effects.      This medication should be kept in the refrigerator until you are ready to use it.  Once removed from the refrigerator, it is good at room temperature for 30 days.  Do not leave in your car or expose to extreme heat.  Do not shake or freeze.  Dispose the used syringe/device in a sharps container.     This injection is a subcutaneous injection, which means just under the skin.  It is important to choose an area of your skin that is not tender, bruised, cut or has scars or stretch marks.  You can inject into your abdomen (except for 2 inches around your navel), your thigh or your upper arm.  Do not administer with other drugs.   Rotate injection sites each time you give the injection. Wash your hands prior to giving yourself an injection and use an alcohol wipe to clean the area of the injection and allow to dry prior to injecting.      If you miss a dose, take it as  soon as you remember and resume the original schedule if it is within 7 days from the missed dose.  If an every 2 week dose is not administered within 7 days, wait until the next dose on the original schedule.  If a once-monthly dose is not administered within 7 days, administer the dose and start a new schedule based on this date.     Be sure to let your doctor or pharmacist know of any medication changes, including OTC and herbal supplements.     Adverse Effects  Reviewed with patient and education on management provided.     • Sore Throat  • Injection site pain  • Itching or irritation   • Flu-like symptoms  • Signs of an allergic reaction     Immunizations  While there are no immunizations that you need to get specifically because you are on this drug, it is important to keep up with your recommended routine vaccinations.     Adherence and Self-Administration    • Barriers to Patient Adherence and/or Self-Administration: None  • Methods for Supporting Patient Adherence and/or Self-Administration: Pt receives injection in clinic monthly      Goals of Therapy  • Patient Goals of Therapy: Adherence to medication   • Clinical Goals or Therapeutic Targets, If Applicable: LDL Reduction      Attestation  I attest that the initiated specialty medication(s) are appropriate for the patient based on my assessment.  If the prescribed therapy is at any point deemed not appropriate based on the current or future assessments, a consultation will be initiated with the patient's specialty care provider to determine the best course of action. The revised plan of therapy will be documented along with any additional patient education provided.       The patient has been provided with the following education and any applicable administration techniques (i.e. self-injection) have been demonstrated for the therapies indicated. All questions and concerns have been addressed prior to the patient receiving the medication, and the patient  has verbalized understanding of the education and any materials provided.  Additional patient education shall be provided and documented upon request by the patient, provider or payer.      The patient would like to follow-up in clinic for next injection. Care Coordinator to set up future refill outreaches, coordinate prescription delivery, and escalate clinical questions to pharmacist.     Most recent LDL is 64 while taking Repatha on 12/14/21. Patient last visit with cardiology was on 12/6/21.  Next follow up due in July. The appointment notes had to continue Repatha.     Placed Repatha 420mg on RIGHT arm. Pt denies any issues or adverse effects and reports he is still tolerating injection well. Pt will RTC in 1 month for next injection.  Pt is currently up to date with lipid panel and cardiology appointments.     As always patient was asked to remain on campus for 15 minutes after infusion. Patient reported feeling well when leaving clinic.     Will follow-up in 1 month for next injection.        Thank you,     Lavinia Tristan. Dillan, PharmD  4/25/2022  08:00 EDT

## 2022-05-23 ENCOUNTER — SPECIALTY PHARMACY (OUTPATIENT)
Dept: PHARMACY | Facility: HOSPITAL | Age: 57
End: 2022-05-23

## 2022-05-23 ENCOUNTER — DISEASE STATE MANAGEMENT VISIT (OUTPATIENT)
Dept: PHARMACY | Facility: HOSPITAL | Age: 57
End: 2022-05-23

## 2022-05-23 RX ORDER — EVOLOCUMAB 420 MG/3.5
420 KIT SUBCUTANEOUS
Qty: 3.5 ML | Refills: 5 | Status: SHIPPED | OUTPATIENT
Start: 2022-05-23 | End: 2022-11-07 | Stop reason: SDUPTHER

## 2022-05-23 NOTE — PROGRESS NOTES
[facility]ication Management Clinic  Lipid Management Program - PCSK9i       Sánchez Chandler is a 56 y.o. male referred to the [facility] Management Clinic by Caridad Cassidy for clinical pharmacy and specialty pharmacy management of PCSK9i.  Sánchez Chandler is  treated for hyperlipidemia and currently does not take anything other than Repatha for his cholesterol.  In the past, Pt has tried Lipitor and Zetia and had allergic reaction of hives/rash with Lipitor. The patient denies any allergies to latex.      Relevant Past Medical History and Comorbidities  Past Medical History:   Diagnosis Date   • HTN (hypertension)      Social History     Socioeconomic History   • Marital status:    Tobacco Use   • Smoking status: Never Smoker   • Smokeless tobacco: Never Used   Substance and Sexual Activity   • Alcohol use: No   • Drug use: No   • Sexual activity: Defer       Allergies  Atorvastatin and Penicillins    Current Medication List    Current Outpatient Medications:   •  albuterol sulfate  (90 Base) MCG/ACT inhaler, Inhale 2 puffs Every 4 (Four) Hours As Needed for Wheezing., Disp: 8 g, Rfl: 1  •  Evolocumab with Infusor (Repatha Pushtronex System) solution cartridge, Inject 420 mg under the skin into the appropriate area as directed Every 28 (Twenty-Eight) Days., Disp: 3.5 mL, Rfl: 5  •  Flaxseed, Linseed, (FLAXSEED OIL) 1000 MG capsule, Take 1 capsule by mouth Daily., Disp: , Rfl:   •  metoprolol succinate XL (TOPROL-XL) 50 MG 24 hr tablet, Take 1 tablet by mouth Daily., Disp: 30 tablet, Rfl: 11    Drug Interactions  No known drug drug interactions with Repatha expected according to literature     Relevant Laboratory Values  Lab Results   Component Value Date    CHOL 116 12/14/2021    TRIG 94 12/14/2021    HDL 34 (L) 12/14/2021    LDL 64 12/14/2021       Medication Assessment & Plan    Will begin the prior authorization, if applicable.  Will see patient in clinic once prior authorization is obtained to  order medication, provide injection training and medication counseling and follow-up with patient as necessary.          Initial Education Provided for Praluent/Repatha    Patient seen in the Medication Management Clinic for initial education and injection training for PCSK9 inhibitors. The patient was introduced to services offered by [facility], including: regular assessments, refill coordination, curbside pick-up or mail order delivery options, prior authorization maintenance, and financial assistance programs as applicable. The patient was also provided with contact information for the pharmacy team. Welcome information and patient satisfaction survey to be sent by retail team with patient's initial fill.    Patient Instructions    Repatha  is used to lower LDL, or bad cholesterol, to help reduce your chance of a heart attack or stroke.  You should give your injection once every 28 days.  Your doctor will likely keep you on this medication indefinitely as long as it is working for you and not causing any adverse effects.      This medication should be kept in the refrigerator until you are ready to use it.  Once removed from the refrigerator, it is good at room temperature for 30 days.  Do not leave in your car or expose to extreme heat.  Do not shake or freeze.  Dispose the used syringe/device in a sharps container.     This injection is a subcutaneous injection, which means just under the skin.  It is important to choose an area of your skin that is not tender, bruised, cut or has scars or stretch marks.  You can inject into your abdomen (except for 2 inches around your navel), your thigh or your upper arm.  Do not administer with other drugs.   Rotate injection sites each time you give the injection. Wash your hands prior to giving yourself an injection and use an alcohol wipe to clean the area of the injection and allow to dry prior to injecting.      If you miss a dose, take it as  soon as you remember and resume the original schedule if it is within 7 days from the missed dose.  If an every 2 week dose is not administered within 7 days, wait until the next dose on the original schedule.  If a once-monthly dose is not administered within 7 days, administer the dose and start a new schedule based on this date.     Be sure to let your doctor or pharmacist know of any medication changes, including OTC and herbal supplements.     Adverse Effects  Reviewed with patient and education on management provided.     • Sore Throat  • Injection site pain  • Itching or irritation   • Flu-like symptoms  • Signs of an allergic reaction     Immunizations  While there are no immunizations that you need to get specifically because you are on this drug, it is important to keep up with your recommended routine vaccinations.     Adherence and Self-Administration    • Barriers to Patient Adherence and/or Self-Administration: None  • Methods for Supporting Patient Adherence and/or Self-Administration: Pt receives injection in clinic monthly      Goals of Therapy  • Patient Goals of Therapy: Adherence to medication   • Clinical Goals or Therapeutic Targets, If Applicable: LDL Reduction      Attestation  I attest that the initiated specialty medication(s) are appropriate for the patient based on my assessment.  If the prescribed therapy is at any point deemed not appropriate based on the current or future assessments, a consultation will be initiated with the patient's specialty care provider to determine the best course of action. The revised plan of therapy will be documented along with any additional patient education provided.       The patient has been provided with the following education and any applicable administration techniques (i.e. self-injection) have been demonstrated for the therapies indicated. All questions and concerns have been addressed prior to the patient receiving the medication, and the patient  has verbalized understanding of the education and any materials provided.  Additional patient education shall be provided and documented upon request by the patient, provider or payer.      The patient would like to follow-up in clinic for next injection. Care Coordinator to set up future refill outreaches, coordinate prescription delivery, and escalate clinical questions to pharmacist.     Most recent LDL is 64 while taking Repatha on 12/14/21. Patient last visit with cardiology was on 12/6/21.  Next follow up due in July. The appointment notes had to continue Repatha.     Placed Repatha 420mg on LEFT arm. Pt denies any issues or adverse effects and reports he is still tolerating injection well. Pt will RTC in 1 month for next injection.  Pt is currently up to date with lipid panel and cardiology appointments.     Pt has been sent 6 more months of refills today. Pt is aware to keep upcoming cardiology appt in July and will return to the clinic in 28 days for next injection.     As always patient was asked to remain on campus for 15 minutes after infusion. Patient reported feeling well when leaving clinic.     Will follow-up in 1 month for next injection.        Thank you,     Lavinia Tristan. Dillan, PharmD  5/23/2022  08:10 EDT

## 2022-06-20 ENCOUNTER — DISEASE STATE MANAGEMENT VISIT (OUTPATIENT)
Dept: PHARMACY | Facility: HOSPITAL | Age: 57
End: 2022-06-20

## 2022-06-20 ENCOUNTER — SPECIALTY PHARMACY (OUTPATIENT)
Dept: PHARMACY | Facility: HOSPITAL | Age: 57
End: 2022-06-20

## 2022-06-20 NOTE — PROGRESS NOTES
Medication Management Clinic  Lipid Management Program - PCSK9i       Sánchez Chandler is a 56 y.o. male referred to the [facility]ication Management Clinic by Caridad Cassidy for clinical pharmacy and specialty pharmacy management of PCSK9i.  Sánchez Chandler is  treated for hyperlipidemia and currently does not take anything other than Repatha for his cholesterol.  In the past, Pt has tried Lipitor and Zetia and had allergic reaction of hives/rash with Lipitor. The patient denies any allergies to latex.      Relevant Past Medical History and Comorbidities  Past Medical History:   Diagnosis Date   • HTN (hypertension)      Social History     Socioeconomic History   • Marital status:    Tobacco Use   • Smoking status: Never Smoker   • Smokeless tobacco: Never Used   Substance and Sexual Activity   • Alcohol use: No   • Drug use: No   • Sexual activity: Defer       Allergies  Atorvastatin and Penicillins    Current Medication List    Current Outpatient Medications:   •  albuterol sulfate  (90 Base) MCG/ACT inhaler, Inhale 2 puffs Every 4 (Four) Hours As Needed for Wheezing., Disp: 8 g, Rfl: 1  •  Evolocumab with Infusor (Repatha Pushtronex System) solution cartridge, Inject 420 mg under the skin into the appropriate area as directed Every 28 (Twenty-Eight) Days., Disp: 3.5 mL, Rfl: 5  •  Flaxseed, Linseed, (FLAXSEED OIL) 1000 MG capsule, Take 1 capsule by mouth Daily., Disp: , Rfl:   •  metoprolol succinate XL (TOPROL-XL) 50 MG 24 hr tablet, Take 1 tablet by mouth Daily., Disp: 30 tablet, Rfl: 11    Drug Interactions  No known drug drug interactions with Repatha expected according to literature     Relevant Laboratory Values  Lab Results   Component Value Date    CHOL 116 12/14/2021    TRIG 94 12/14/2021    HDL 34 (L) 12/14/2021    LDL 64 12/14/2021       Medication Assessment & Plan    Will begin the prior authorization, if applicable.  Will see patient in clinic once prior authorization is obtained to  order medication, provide injection training and medication counseling and follow-up with patient as necessary.          Initial Education Provided for Praluent/Repatha    Patient seen in the Medication Management Clinic for initial education and injection training for PCSK9 inhibitors. The patient was introduced to services offered by [facility], including: regular assessments, refill coordination, curbside pick-up or mail order delivery options, prior authorization maintenance, and financial assistance programs as applicable. The patient was also provided with contact information for the pharmacy team. Welcome information and patient satisfaction survey to be sent by retail team with patient's initial fill.    Patient Instructions    Repatha  is used to lower LDL, or bad cholesterol, to help reduce your chance of a heart attack or stroke.  You should give your injection once every 28 days.  Your doctor will likely keep you on this medication indefinitely as long as it is working for you and not causing any adverse effects.      This medication should be kept in the refrigerator until you are ready to use it.  Once removed from the refrigerator, it is good at room temperature for 30 days.  Do not leave in your car or expose to extreme heat.  Do not shake or freeze.  Dispose the used syringe/device in a sharps container.     This injection is a subcutaneous injection, which means just under the skin.  It is important to choose an area of your skin that is not tender, bruised, cut or has scars or stretch marks.  You can inject into your abdomen (except for 2 inches around your navel), your thigh or your upper arm.  Do not administer with other drugs.   Rotate injection sites each time you give the injection. Wash your hands prior to giving yourself an injection and use an alcohol wipe to clean the area of the injection and allow to dry prior to injecting.      If you miss a dose, take it as  soon as you remember and resume the original schedule if it is within 7 days from the missed dose.  If an every 2 week dose is not administered within 7 days, wait until the next dose on the original schedule.  If a once-monthly dose is not administered within 7 days, administer the dose and start a new schedule based on this date.     Be sure to let your doctor or pharmacist know of any medication changes, including OTC and herbal supplements.     Adverse Effects  Reviewed with patient and education on management provided.     • Sore Throat  • Injection site pain  • Itching or irritation   • Flu-like symptoms  • Signs of an allergic reaction     Immunizations  While there are no immunizations that you need to get specifically because you are on this drug, it is important to keep up with your recommended routine vaccinations.     Adherence and Self-Administration    • Barriers to Patient Adherence and/or Self-Administration: None  • Methods for Supporting Patient Adherence and/or Self-Administration: Pt receives injection in clinic monthly      Goals of Therapy  • Patient Goals of Therapy: Adherence to medication   • Clinical Goals or Therapeutic Targets, If Applicable: LDL Reduction      Attestation  I attest that the initiated specialty medication(s) are appropriate for the patient based on my assessment.  If the prescribed therapy is at any point deemed not appropriate based on the current or future assessments, a consultation will be initiated with the patient's specialty care provider to determine the best course of action. The revised plan of therapy will be documented along with any additional patient education provided.       The patient has been provided with the following education and any applicable administration techniques (i.e. self-injection) have been demonstrated for the therapies indicated. All questions and concerns have been addressed prior to the patient receiving the medication, and the patient  has verbalized understanding of the education and any materials provided.  Additional patient education shall be provided and documented upon request by the patient, provider or payer.      The patient would like to follow-up in clinic for next injection. Care Coordinator to set up future refill outreaches, coordinate prescription delivery, and escalate clinical questions to pharmacist.     Most recent LDL is 64 while taking Repatha on 12/14/21. Patient last visit with cardiology was on 12/6/21.  Next follow up due in July. The appointment notes had to continue Repatha.     Placed Repatha 420mg on RIGHT arm. Pt denies any issues or adverse effects and reports he is still tolerating injection well. Pt will RTC in 1 month for next injection.  Pt is currently up to date with lipid panel and cardiology appointments.      Pt is aware to keep upcoming cardiology appt in July and will return to the clinic in 28 days for next injection.     As always patient was asked to remain on campus for 15 minutes after infusion. Patient reported feeling well when leaving clinic.     Will follow-up in 1 month for next injection.        Thank you,     Lavinia Tristan. Dillan, PharmD  6/20/2022  07:55 EDT

## 2022-06-20 NOTE — PROGRESS NOTES
[facility]  Lipid Management Program - PCSK9i       Sánchez Chandler is a 56 y.o. male referred to the [facility] by Caridad Cassidy for clinical pharmacy and specialty pharmacy management of PCSK9i.  Sánchez Chandler is  treated for hyperlipidemia and currently does not take anything other than Repatha for his cholesterol.  In the past, Pt has tried Lipitor and Zetia and had allergic reaction of hives/rash with Lipitor. The patient denies any allergies to latex.      Relevant Past Medical History and Comorbidities  Past Medical History:   Diagnosis Date   • HTN (hypertension)      Social History     Socioeconomic History   • Marital status:    Tobacco Use   • Smoking status: Never Smoker   • Smokeless tobacco: Never Used   Substance and Sexual Activity   • Alcohol use: No   • Drug use: No   • Sexual activity: Defer       Allergies  Atorvastatin and Penicillins    Current Medication List    Current Outpatient Medications:   •  albuterol sulfate  (90 Base) MCG/ACT inhaler, Inhale 2 puffs Every 4 (Four) Hours As Needed for Wheezing., Disp: 8 g, Rfl: 1  •  Evolocumab with Infusor (Repatha Pushtronex System) solution cartridge, Inject 420 mg under the skin into the appropriate area as directed Every 28 (Twenty-Eight) Days., Disp: 3.5 mL, Rfl: 5  •  Flaxseed, Linseed, (FLAXSEED OIL) 1000 MG capsule, Take 1 capsule by mouth Daily., Disp: , Rfl:   •  metoprolol succinate XL (TOPROL-XL) 50 MG 24 hr tablet, Take 1 tablet by mouth Daily., Disp: 30 tablet, Rfl: 11    Drug Interactions  No known drug drug interactions with Repatha expected according to literature     Relevant Laboratory Values  Lab Results   Component Value Date    CHOL 116 12/14/2021    TRIG 94 12/14/2021    HDL 34 (L) 12/14/2021    LDL 64 12/14/2021       Medication Assessment & Plan    Will begin the prior authorization, if applicable.  Will see patient in clinic once prior authorization is obtained to  order medication, provide injection training and medication counseling and follow-up with patient as necessary.          Initial Education Provided for Praluent/Repatha    Patient seen in the [facility] for initial education and injection training for PCSK9 inhibitors. The patient was introduced to services offered by [facility], including: regular assessments, refill coordination, curbside pick-up or mail order delivery options, prior authorization maintenance, and financial assistance programs as applicable. The patient was also provided with contact information for the pharmacy team. Welcome information and patient satisfaction survey to be sent by retail team with patient's initial fill.    Patient Instructions    Repatha  is used to lower LDL, or bad cholesterol, to help reduce your chance of a heart attack or stroke.  You should give your injection once every 28 days.  Your doctor will likely keep you on this medication indefinitely as long as it is working for you and not causing any adverse effects.      This medication should be kept in the refrigerator until you are ready to use it.  Once removed from the refrigerator, it is good at room temperature for 30 days.  Do not leave in your car or expose to extreme heat.  Do not shake or freeze.  Dispose the used syringe/device in a sharps container.     This injection is a subcutaneous injection, which means just under the skin.  It is important to choose an area of your skin that is not tender, bruised, cut or has scars or stretch marks.  You can inject into your abdomen (except for 2 inches around your navel), your thigh or your upper arm.  Do not administer with other drugs.   Rotate injection sites each time you give the injection. Wash your hands prior to giving yourself an injection and use an alcohol wipe to clean the area of the injection and allow to dry prior to injecting.      If you miss a dose, take it as  soon as you remember and resume the original schedule if it is within 7 days from the missed dose.  If an every 2 week dose is not administered within 7 days, wait until the next dose on the original schedule.  If a once-monthly dose is not administered within 7 days, administer the dose and start a new schedule based on this date.     Be sure to let your doctor or pharmacist know of any medication changes, including OTC and herbal supplements.     Adverse Effects  Reviewed with patient and education on management provided.     • Sore Throat  • Injection site pain  • Itching or irritation   • Flu-like symptoms  • Signs of an allergic reaction     Immunizations  While there are no immunizations that you need to get specifically because you are on this drug, it is important to keep up with your recommended routine vaccinations.     Adherence and Self-Administration    • Barriers to Patient Adherence and/or Self-Administration: None  • Methods for Supporting Patient Adherence and/or Self-Administration: Pt receives injection in clinic monthly      Goals of Therapy  • Patient Goals of Therapy: Adherence to medication   • Clinical Goals or Therapeutic Targets, If Applicable: LDL Reduction      Attestation  I attest that the initiated specialty medication(s) are appropriate for the patient based on my assessment.  If the prescribed therapy is at any point deemed not appropriate based on the current or future assessments, a consultation will be initiated with the patient's specialty care provider to determine the best course of action. The revised plan of therapy will be documented along with any additional patient education provided.       The patient has been provided with the following education and any applicable administration techniques (i.e. self-injection) have been demonstrated for the therapies indicated. All questions and concerns have been addressed prior to the patient receiving the medication, and the patient  has verbalized understanding of the education and any materials provided.  Additional patient education shall be provided and documented upon request by the patient, provider or payer.      The patient would like to follow-up in clinic for next injection. Care Coordinator to set up future refill outreaches, coordinate prescription delivery, and escalate clinical questions to pharmacist.     Most recent LDL is 64 while taking Repatha on 12/14/21. Patient last visit with cardiology was on 12/6/21.  Next follow up due in July. The appointment notes had to continue Repatha.     Placed Repatha 420mg on RIGHT arm. Pt denies any issues or adverse effects and reports he is still tolerating injection well. Pt will RTC in 1 month for next injection.  Pt is currently up to date with lipid panel and cardiology appointments.      Pt is aware to keep upcoming cardiology appt in July and will return to the clinic in 28 days for next injection.     As always patient was asked to remain on campus for 15 minutes after infusion. Patient reported feeling well when leaving clinic.     Will follow-up in 1 month for next injection.        Thank you,     Lavinia Tristan. Dillan, PharmD  6/20/2022  08:14 EDT

## 2022-07-06 ENCOUNTER — OFFICE VISIT (OUTPATIENT)
Dept: CARDIOLOGY | Facility: CLINIC | Age: 57
End: 2022-07-06

## 2022-07-06 VITALS
SYSTOLIC BLOOD PRESSURE: 139 MMHG | DIASTOLIC BLOOD PRESSURE: 89 MMHG | TEMPERATURE: 97.1 F | BODY MASS INDEX: 32.72 KG/M2 | HEIGHT: 72 IN | HEART RATE: 91 BPM | WEIGHT: 241.6 LBS

## 2022-07-06 DIAGNOSIS — I10 ESSENTIAL HYPERTENSION: Primary | ICD-10-CM

## 2022-07-06 DIAGNOSIS — E78.5 DYSLIPIDEMIA: ICD-10-CM

## 2022-07-06 PROCEDURE — 99213 OFFICE O/P EST LOW 20 MIN: CPT | Performed by: NURSE PRACTITIONER

## 2022-07-06 PROCEDURE — 93000 ELECTROCARDIOGRAM COMPLETE: CPT | Performed by: NURSE PRACTITIONER

## 2022-07-06 NOTE — PROGRESS NOTES
"Oliverio Escobar MD  Sánchez Chandler  1965 07/06/2022    Patient Active Problem List   Diagnosis   • Essential hypertension   • History of chest pain   • Dyslipidemia       Dear Oliverio Escobar MD:    Subjective     Chief Complaint   Patient presents with   • Follow-up     7 month, labs   • Med Management           History of Present Illness:    Sánchez Chandler is a 56 y.o. male with a past medical history of hypertension and dyslipidemia. The patient denies chest pain, shortness of breath, palpitations, dizziness, lightheadedness, near syncope, and syncope. His blood pressure has been well controlled. He has been taking Repatha and tolerating well. He states he is feeling great.             Allergies   Allergen Reactions   • Atorvastatin Hives and Rash     Pt states atorvastatin make \"itchy, red bumps appear all over his body\"   • Penicillins    :      Current Outpatient Medications:   •  albuterol sulfate  (90 Base) MCG/ACT inhaler, Inhale 2 puffs Every 4 (Four) Hours As Needed for Wheezing., Disp: 8 g, Rfl: 1  •  Evolocumab with Infusor (Repatha Pushtronex System) solution cartridge, Inject 420 mg under the skin into the appropriate area as directed Every 28 (Twenty-Eight) Days., Disp: 3.5 mL, Rfl: 5  •  Flaxseed, Linseed, (FLAXSEED OIL) 1000 MG capsule, Take 1 capsule by mouth Daily., Disp: , Rfl:   •  metoprolol succinate XL (TOPROL-XL) 50 MG 24 hr tablet, Take 1 tablet by mouth Daily., Disp: 30 tablet, Rfl: 11      The following portions of the patient's history were reviewed and updated as appropriate: allergies, current medications, past family history, past medical history, past social history, past surgical history and problem list.    Social History     Tobacco Use   • Smoking status: Never Smoker   • Smokeless tobacco: Never Used   Substance Use Topics   • Alcohol use: No   • Drug use: No       ROS    Objective   Vitals:    07/06/22 0812   BP: 139/89   Pulse: 91   Temp: 97.1 " "°F (36.2 °C)   Weight: 110 kg (241 lb 9.6 oz)   Height: 182.9 cm (72.01\")     Body mass index is 32.76 kg/m².        Vitals reviewed.   Constitutional:       Appearance: Healthy appearance. Well-developed and not in distress.   HENT:      Head: Normocephalic and atraumatic.   Pulmonary:      Effort: Pulmonary effort is normal.      Breath sounds: Normal breath sounds. No wheezing. No rales.   Cardiovascular:      Normal rate. Regular rhythm.      Murmurs: There is no murmur.      . No S3 and S4 gallop.   Edema:     Peripheral edema absent.   Abdominal:      General: Bowel sounds are normal.      Palpations: Abdomen is soft.   Skin:     General: Skin is warm and dry.   Neurological:      Mental Status: Alert, oriented to person, place, and time and oriented to person, place and time.   Psychiatric:         Mood and Affect: Mood normal.         Behavior: Behavior normal.         Lab Results   Component Value Date     12/14/2021    K 4.2 12/14/2021     12/14/2021    CO2 23.8 12/14/2021    BUN 20 12/14/2021    CREATININE 1.20 12/14/2021    GLUCOSE 100 (H) 12/14/2021    CALCIUM 9.4 12/14/2021    AST 20 12/14/2021    ALT 28 12/14/2021    ALKPHOS 63 12/14/2021        Lab Results   Component Value Date    TRIG 94 12/14/2021    HDL 34 (L) 12/14/2021    LDL 64 12/14/2021               ECG 12 Lead    Date/Time: 7/6/2022 8:05 AM  Performed by: Caridad Cassidy APRN  Authorized by: Caridad Cassidy APRN   Comparison: compared with previous ECG   Similar to previous ECG  Rhythm: sinus rhythm  BPM: 91  Conduction: incomplete right bundle branch block  Q waves: III and aVF                  Assessment & Plan    Diagnosis Plan   1. Essential hypertension  ECG 12 Lead    Comprehensive Metabolic Panel    Lipid Panel   2. Dyslipidemia  ECG 12 Lead    Comprehensive Metabolic Panel    Lipid Panel                Recommendations:    1. Essential Hypertension - well controlled. Continue metoprolol.  2. Dyslipidemia - continue " Repatha. CMP and lipid panel ordered today.  3. Follow up in 6 months or sooner if needed.         Return in about 6 months (around 1/6/2023) for Recheck.    As always, I appreciate very much the opportunity to participate in the cardiovascular care of your patients.      With Best Regards,    JULIÁN Paz

## 2022-07-18 ENCOUNTER — DISEASE STATE MANAGEMENT VISIT (OUTPATIENT)
Dept: PHARMACY | Facility: HOSPITAL | Age: 57
End: 2022-07-18

## 2022-07-18 ENCOUNTER — SPECIALTY PHARMACY (OUTPATIENT)
Dept: PHARMACY | Facility: HOSPITAL | Age: 57
End: 2022-07-18

## 2022-07-18 NOTE — PROGRESS NOTES
[facility]ication [facility] Clinic  Lipid Management Program - PCSK9i       Sánchez Chandler is a 56 y.o. male referred to the [facility] Clinic by Caridad Cassidy for clinical pharmacy and specialty pharmacy management of PCSK9i.  Sánchez Chandler is  treated for hyperlipidemia and currently does not take anything other than Repatha for his cholesterol.  In the past, Pt has tried Lipitor and Zetia and had allergic reaction of hives/rash with Lipitor. The patient denies any allergies to latex.      Relevant Past Medical History and Comorbidities  Past Medical History:   Diagnosis Date   • HTN (hypertension)      Social History     Socioeconomic History   • Marital status:    Tobacco Use   • Smoking status: Never Smoker   • Smokeless tobacco: Never Used   Substance and Sexual Activity   • Alcohol use: No   • Drug use: No   • Sexual activity: Defer       Allergies  Atorvastatin and Penicillins    Current Medication List    Current Outpatient Medications:   •  albuterol sulfate  (90 Base) MCG/ACT inhaler, Inhale 2 puffs Every 4 (Four) Hours As Needed for Wheezing., Disp: 8 g, Rfl: 1  •  Evolocumab with Infusor (Repatha Pushtronex System) solution cartridge, Inject 420 mg under the skin into the appropriate area as directed Every 28 (Twenty-Eight) Days., Disp: 3.5 mL, Rfl: 5  •  Flaxseed, Linseed, (FLAXSEED OIL) 1000 MG capsule, Take 1 capsule by mouth Daily., Disp: , Rfl:   •  metoprolol succinate XL (TOPROL-XL) 50 MG 24 hr tablet, Take 1 tablet by mouth Daily., Disp: 30 tablet, Rfl: 11    Drug Interactions  No known drug drug interactions with Repatha expected according to literature     Relevant Laboratory Values  Lab Results   Component Value Date    CHOL 116 12/14/2021    TRIG 94 12/14/2021    HDL 34 (L) 12/14/2021    LDL 64 12/14/2021       Medication Assessment & Plan    Will begin the prior authorization, if applicable.  Will see patient in clinic once prior authorization is obtained to  order medication, provide injection training and medication counseling and follow-up with patient as necessary.          Initial Education Provided for Praluent/Repatha    Patient seen in the Medication Management Clinic for initial education and injection training for PCSK9 inhibitors. The patient was introduced to services offered by [facility], including: regular assessments, refill coordination, curbside pick-up or mail order delivery options, prior authorization maintenance, and financial assistance programs as applicable. The patient was also provided with contact information for the pharmacy team. Welcome information and patient satisfaction survey to be sent by retail team with patient's initial fill.    Patient Instructions    Repatha  is used to lower LDL, or bad cholesterol, to help reduce your chance of a heart attack or stroke.  You should give your injection once every 28 days.  Your doctor will likely keep you on this medication indefinitely as long as it is working for you and not causing any adverse effects.      This medication should be kept in the refrigerator until you are ready to use it.  Once removed from the refrigerator, it is good at room temperature for 30 days.  Do not leave in your car or expose to extreme heat.  Do not shake or freeze.  Dispose the used syringe/device in a sharps container.     This injection is a subcutaneous injection, which means just under the skin.  It is important to choose an area of your skin that is not tender, bruised, cut or has scars or stretch marks.  You can inject into your abdomen (except for 2 inches around your navel), your thigh or your upper arm.  Do not administer with other drugs.   Rotate injection sites each time you give the injection. Wash your hands prior to giving yourself an injection and use an alcohol wipe to clean the area of the injection and allow to dry prior to injecting.      If you miss a dose, take it as  soon as you remember and resume the original schedule if it is within 7 days from the missed dose.  If an every 2 week dose is not administered within 7 days, wait until the next dose on the original schedule.  If a once-monthly dose is not administered within 7 days, administer the dose and start a new schedule based on this date.     Be sure to let your doctor or pharmacist know of any medication changes, including OTC and herbal supplements.     Adverse Effects  Reviewed with patient and education on management provided.     • Sore Throat  • Injection site pain  • Itching or irritation   • Flu-like symptoms  • Signs of an allergic reaction     Immunizations  While there are no immunizations that you need to get specifically because you are on this drug, it is important to keep up with your recommended routine vaccinations.     Adherence and Self-Administration    • Barriers to Patient Adherence and/or Self-Administration: None  • Methods for Supporting Patient Adherence and/or Self-Administration: Pt receives injection in clinic monthly      Goals of Therapy  • Patient Goals of Therapy: Adherence to medication   • Clinical Goals or Therapeutic Targets, If Applicable: LDL Reduction      Attestation  I attest that the initiated specialty medication(s) are appropriate for the patient based on my assessment.  If the prescribed therapy is at any point deemed not appropriate based on the current or future assessments, a consultation will be initiated with the patient's specialty care provider to determine the best course of action. The revised plan of therapy will be documented along with any additional patient education provided.       The patient has been provided with the following education and any applicable administration techniques (i.e. self-injection) have been demonstrated for the therapies indicated. All questions and concerns have been addressed prior to the patient receiving the medication, and the patient  has verbalized understanding of the education and any materials provided.  Additional patient education shall be provided and documented upon request by the patient, provider or payer.      The patient would like to follow-up in clinic for next injection. Care Coordinator to set up future refill outreaches, coordinate prescription delivery, and escalate clinical questions to pharmacist.     Most recent LDL is 64 while taking Repatha on 12/14/21. Patient last visit with cardiology was on 7/6/22.   The appointment notes had to continue Repatha. Provider put in Lipid panel on this office visit. Have advised patient to get labs prior to his next appt before coming for injection so that he is fasting and can get lab work ordered by Caridad.     Placed Repatha 420mg on LEFT arm. Pt denies any issues or adverse effects and reports he is still tolerating injection well. Pt will RTC in 1 month for next injection.  Pt is currently up to date with lipid panel and cardiology appointments.      Pt is aware to keep upcoming cardiology appt in July and will return to the clinic in 28 days for next injection.     As always patient was asked to remain on campus for 15 minutes after infusion. Patient reported feeling well when leaving clinic.     Will follow-up in 1 month for next injection.        Thank you,     Lavinia Tristan. Dillan, PharmD  7/18/2022  08:56 EDT

## 2022-07-18 NOTE — PROGRESS NOTES
[facility]  Lipid Management Program - PCSK9i       Sánchez Chandler is a 56 y.o. male referred to the [facility] by [facility]idy for clinical pharmacy and specialty pharmacy management of PCSK9i.  Sánchez Chandler is  treated for hyperlipidemia and currently does not take anything other than Repatha for his cholesterol.  In the past, Pt has tried Lipitor and Zetia and had allergic reaction of hives/rash with Lipitor. The patient denies any allergies to latex.      Relevant Past Medical History and Comorbidities  Past Medical History:   Diagnosis Date   • HTN (hypertension)      Social History     Socioeconomic History   • Marital status:    Tobacco Use   • Smoking status: Never Smoker   • Smokeless tobacco: Never Used   Substance and Sexual Activity   • Alcohol use: No   • Drug use: No   • Sexual activity: Defer       Allergies  Atorvastatin and Penicillins    Current Medication List    Current Outpatient Medications:   •  albuterol sulfate  (90 Base) MCG/ACT inhaler, Inhale 2 puffs Every 4 (Four) Hours As Needed for Wheezing., Disp: 8 g, Rfl: 1  •  Evolocumab with Infusor (Repatha Pushtronex System) solution cartridge, Inject 420 mg under the skin into the appropriate area as directed Every 28 (Twenty-Eight) Days., Disp: 3.5 mL, Rfl: 5  •  Flaxseed, Linseed, (FLAXSEED OIL) 1000 MG capsule, Take 1 capsule by mouth Daily., Disp: , Rfl:   •  metoprolol succinate XL (TOPROL-XL) 50 MG 24 hr tablet, Take 1 tablet by mouth Daily., Disp: 30 tablet, Rfl: 11    Drug Interactions  No known drug drug interactions with Repatha expected according to literature     Relevant Laboratory Values  Lab Results   Component Value Date    CHOL 116 12/14/2021    TRIG 94 12/14/2021    HDL 34 (L) 12/14/2021    LDL 64 12/14/2021       Medication Assessment & Plan    Will begin the prior authorization, if applicable.  Will see patient in clinic once prior authorization is obtained to  order medication, provide injection training and medication counseling and follow-up with patient as necessary.          Initial Education Provided for Praluent/Repatha    Patient seen in the [facility] Management Clinic for initial education and injection training for PCSK9 inhibitors. The patient was introduced to services offered by [facility], including: regular assessments, refill coordination, curbside pick-up or mail order delivery options, prior authorization maintenance, and financial assistance programs as applicable. The patient was also provided with contact information for the pharmacy team. Welcome information and patient satisfaction survey to be sent by retail team with patient's initial fill.    Patient Instructions    Repatha  is used to lower LDL, or bad cholesterol, to help reduce your chance of a heart attack or stroke.  You should give your injection once every 28 days.  Your doctor will likely keep you on this medication indefinitely as long as it is working for you and not causing any adverse effects.      This medication should be kept in the refrigerator until you are ready to use it.  Once removed from the refrigerator, it is good at room temperature for 30 days.  Do not leave in your car or expose to extreme heat.  Do not shake or freeze.  Dispose the used syringe/device in a sharps container.     This injection is a subcutaneous injection, which means just under the skin.  It is important to choose an area of your skin that is not tender, bruised, cut or has scars or stretch marks.  You can inject into your abdomen (except for 2 inches around your navel), your thigh or your upper arm.  Do not administer with other drugs.   Rotate injection sites each time you give the injection. Wash your hands prior to giving yourself an injection and use an alcohol wipe to clean the area of the injection and allow to dry prior to injecting.      If you miss a dose, take it as  soon as you remember and resume the original schedule if it is within 7 days from the missed dose.  If an every 2 week dose is not administered within 7 days, wait until the next dose on the original schedule.  If a once-monthly dose is not administered within 7 days, administer the dose and start a new schedule based on this date.     Be sure to let your doctor or pharmacist know of any medication changes, including OTC and herbal supplements.     Adverse Effects  Reviewed with patient and education on management provided.     • Sore Throat  • Injection site pain  • Itching or irritation   • Flu-like symptoms  • Signs of an allergic reaction     Immunizations  While there are no immunizations that you need to get specifically because you are on this drug, it is important to keep up with your recommended routine vaccinations.     Adherence and Self-Administration    • Barriers to Patient Adherence and/or Self-Administration: None  • Methods for Supporting Patient Adherence and/or Self-Administration: Pt receives injection in clinic monthly      Goals of Therapy  • Patient Goals of Therapy: Adherence to medication   • Clinical Goals or Therapeutic Targets, If Applicable: LDL Reduction      Attestation  I attest that the initiated specialty medication(s) are appropriate for the patient based on my assessment.  If the prescribed therapy is at any point deemed not appropriate based on the current or future assessments, a consultation will be initiated with the patient's specialty care provider to determine the best course of action. The revised plan of therapy will be documented along with any additional patient education provided.       The patient has been provided with the following education and any applicable administration techniques (i.e. self-injection) have been demonstrated for the therapies indicated. All questions and concerns have been addressed prior to the patient receiving the medication, and the patient  has verbalized understanding of the education and any materials provided.  Additional patient education shall be provided and documented upon request by the patient, provider or payer.      The patient would like to follow-up in clinic for next injection. Care Coordinator to set up future refill outreaches, coordinate prescription delivery, and escalate clinical questions to pharmacist.     Most recent LDL is 64 while taking Repatha on 12/14/21. Patient last visit with cardiology was on 7/6/22.   The appointment notes had to continue Repatha. Provider put in Lipid panel on this office visit. Have advised patient to get labs prior to his next appt before coming for injection so that he is fasting and can get lab work ordered by [facility].     Placed Repatha 420mg on LEFT arm. Pt denies any issues or adverse effects and reports he is still tolerating injection well. Pt will RTC in 1 month for next injection.  Pt is currently up to date with lipid panel (from clinic orders) and cardiology appointments.      Pt is aware to get labs before next visit and will return to the clinic in 28 days for next injection.     As always patient was asked to remain on campus for 15 minutes after infusion. Patient reported feeling well when leaving clinic.     Will follow-up in 1 month for next injection.        Thank you,     Lavinia Tristan. Dillan, Aba  7/18/2022  09:11 EDT

## 2022-08-15 ENCOUNTER — DISEASE STATE MANAGEMENT VISIT (OUTPATIENT)
Dept: PHARMACY | Facility: HOSPITAL | Age: 57
End: 2022-08-15

## 2022-08-15 NOTE — PROGRESS NOTES
[facility]ication [facility] Clinic  Lipid Management Program - PCSK9i       Sánchez Chandler is a 56 y.o. male referred to the [facility] Management Clinic by [facility] Cassidy for clinical pharmacy and specialty pharmacy management of PCSK9i.  Sánchez Chandler is  treated for hyperlipidemia and currently does not take anything other than Repatha for his cholesterol.  In the past, Pt has tried Lipitor and Zetia and had allergic reaction of hives/rash with Lipitor. The patient denies any allergies to latex.      Relevant Past Medical History and Comorbidities  Past Medical History:   Diagnosis Date   • HTN (hypertension)      Social History     Socioeconomic History   • Marital status:    Tobacco Use   • Smoking status: Never Smoker   • Smokeless tobacco: Never Used   Substance and Sexual Activity   • Alcohol use: No   • Drug use: No   • Sexual activity: Defer       Allergies  Atorvastatin and Penicillins    Current Medication List    Current Outpatient Medications:   •  albuterol sulfate  (90 Base) MCG/ACT inhaler, Inhale 2 puffs Every 4 (Four) Hours As Needed for Wheezing., Disp: 8 g, Rfl: 1  •  Evolocumab with Infusor (Repatha Pushtronex System) solution cartridge, Inject 420 mg under the skin into the appropriate area as directed Every 28 (Twenty-Eight) Days., Disp: 3.5 mL, Rfl: 5  •  Flaxseed, Linseed, (FLAXSEED OIL) 1000 MG capsule, Take 1 capsule by mouth Daily., Disp: , Rfl:   •  metoprolol succinate XL (TOPROL-XL) 50 MG 24 hr tablet, Take 1 tablet by mouth Daily., Disp: 30 tablet, Rfl: 11    Drug Interactions  No known drug drug interactions with Repatha expected according to literature     Relevant Laboratory Values  Lab Results   Component Value Date    CHOL 116 12/14/2021    TRIG 94 12/14/2021    HDL 34 (L) 12/14/2021    LDL 64 12/14/2021       Medication Assessment & Plan    The patient would like to follow-up in clinic for next injection. Care Coordinator to set up future refill outreaches,  coordinate prescription delivery, and escalate clinical questions to pharmacist.     Most recent LDL is 64 while taking Repatha on 12/14/21. Patient last visit with cardiology was on 7/6/22.   The appointment notes had to continue Repatha. Provider put in Lipid panel on this office visit. I have advised patient again this month to get labs prior to his next appt before coming for injection so that he is fasting and can get lab work ordered by [facility]. Patient thought that he had gotten labs this summer, but he has agreed to get them before his next appointment with us.     Placed Repatha 420mg on RIGHT arm. Pt denies any issues or adverse effects and reports he is still tolerating injection well. Pt will RTC in 1 month for next injection.  Pt is currently up to date with lipid panel (from clinic orders) and cardiology appointments.     As always patient was asked to remain on campus for 15 minutes after infusion. Patient reported feeling well when leaving clinic.     Will follow-up in 1 month for next injection.      Thank you,     Yesi Nielson RPH  8/15/2022  08:09 EDT

## 2022-08-23 ENCOUNTER — LAB (OUTPATIENT)
Dept: LAB | Facility: HOSPITAL | Age: 57
End: 2022-08-23

## 2022-08-23 DIAGNOSIS — E78.5 DYSLIPIDEMIA: ICD-10-CM

## 2022-08-23 DIAGNOSIS — I10 ESSENTIAL HYPERTENSION: ICD-10-CM

## 2022-08-23 LAB
ALBUMIN SERPL-MCNC: 4.3 G/DL (ref 3.5–5.2)
ALBUMIN/GLOB SERPL: 2 G/DL
ALP SERPL-CCNC: 77 U/L (ref 39–117)
ALT SERPL W P-5'-P-CCNC: 21 U/L (ref 1–41)
ANION GAP SERPL CALCULATED.3IONS-SCNC: 12 MMOL/L (ref 5–15)
AST SERPL-CCNC: 19 U/L (ref 1–40)
BILIRUB SERPL-MCNC: 0.3 MG/DL (ref 0–1.2)
BUN SERPL-MCNC: 18 MG/DL (ref 6–20)
BUN/CREAT SERPL: 17.8 (ref 7–25)
CALCIUM SPEC-SCNC: 9.4 MG/DL (ref 8.6–10.5)
CHLORIDE SERPL-SCNC: 107 MMOL/L (ref 98–107)
CHOLEST SERPL-MCNC: 128 MG/DL (ref 0–200)
CO2 SERPL-SCNC: 20 MMOL/L (ref 22–29)
CREAT SERPL-MCNC: 1.01 MG/DL (ref 0.76–1.27)
EGFRCR SERPLBLD CKD-EPI 2021: 87.8 ML/MIN/1.73
GLOBULIN UR ELPH-MCNC: 2.2 GM/DL
GLUCOSE SERPL-MCNC: 102 MG/DL (ref 65–99)
HDLC SERPL-MCNC: 24 MG/DL (ref 40–60)
LDLC SERPL CALC-MCNC: 40 MG/DL (ref 0–100)
LDLC/HDLC SERPL: 0.69 {RATIO}
POTASSIUM SERPL-SCNC: 4.4 MMOL/L (ref 3.5–5.2)
PROT SERPL-MCNC: 6.5 G/DL (ref 6–8.5)
SODIUM SERPL-SCNC: 139 MMOL/L (ref 136–145)
TRIGL SERPL-MCNC: 437 MG/DL (ref 0–150)
VLDLC SERPL-MCNC: 64 MG/DL (ref 5–40)

## 2022-08-23 PROCEDURE — 80061 LIPID PANEL: CPT

## 2022-08-23 PROCEDURE — 36415 COLL VENOUS BLD VENIPUNCTURE: CPT

## 2022-08-23 PROCEDURE — 80053 COMPREHEN METABOLIC PANEL: CPT

## 2022-09-12 ENCOUNTER — DISEASE STATE MANAGEMENT VISIT (OUTPATIENT)
Dept: PHARMACY | Facility: HOSPITAL | Age: 57
End: 2022-09-12

## 2022-09-12 ENCOUNTER — SPECIALTY PHARMACY (OUTPATIENT)
Dept: PHARMACY | Facility: HOSPITAL | Age: 57
End: 2022-09-12

## 2022-09-12 NOTE — PROGRESS NOTES
Medication Management Clinic  Lipid Management Program - PCSK9i       Sánchez Chandler is a 56 y.o. male referred to the Medication Management Clinic by [facility] Cassidy for clinical pharmacy and specialty pharmacy management of PCSK9i.  Sánchez Chandler is  treated for hyperlipidemia and currently does not take anything other than Repatha for his cholesterol.  In the past, Pt has tried Lipitor and Zetia and had allergic reaction of hives/rash with Lipitor. The patient denies any allergies to latex.      Relevant Past Medical History and Comorbidities  Past Medical History:   Diagnosis Date   • HTN (hypertension)      Social History     Socioeconomic History   • Marital status:    Tobacco Use   • Smoking status: Never Smoker   • Smokeless tobacco: Never Used   Substance and Sexual Activity   • Alcohol use: No   • Drug use: No   • Sexual activity: Defer       Allergies  Atorvastatin and Penicillins    Current Medication List    Current Outpatient Medications:   •  albuterol sulfate  (90 Base) MCG/ACT inhaler, Inhale 2 puffs Every 4 (Four) Hours As Needed for Wheezing., Disp: 8 g, Rfl: 1  •  Evolocumab with Infusor (Repatha Pushtronex System) solution cartridge, Inject 420 mg under the skin into the appropriate area as directed Every 28 (Twenty-Eight) Days., Disp: 3.5 mL, Rfl: 5  •  Flaxseed, Linseed, (FLAXSEED OIL) 1000 MG capsule, Take 1 capsule by mouth Daily., Disp: , Rfl:   •  metoprolol succinate XL (TOPROL-XL) 50 MG 24 hr tablet, Take 1 tablet by mouth Daily., Disp: 30 tablet, Rfl: 11    Drug Interactions  No known drug drug interactions with Repatha expected according to literature     Relevant Laboratory Values  Lab Results   Component Value Date    CHOL 128 08/23/2022    TRIG 437 (H) 08/23/2022    HDL 24 (L) 08/23/2022    LDL 40 08/23/2022       Medication Assessment & Plan    The patient would like to follow-up in clinic for next injection. Care Coordinator to set up future refill  outreaches, coordinate prescription delivery, and escalate clinical questions to pharmacist.     Most recent LDL is 40 while taking Repatha on 8/23/22. Patient last visit with cardiology was on 7/6/22.   The appointment notes had to continue Repatha.  Pt is currently up to date with lipid panel (from clinic orders) and cardiology appointments. Next cardiology appt scheduled for 1/5/23.    Placed Repatha 420mg on LEFT arm. Pt denies any issues or adverse effects and reports he is still tolerating injection well. Pt will RTC in 1 month for next injection.     As always patient was asked to remain on campus for 15 minutes after infusion. Patient reported feeling well when leaving clinic.     Will follow-up in 1 month for next injection.      Thank you,     Lavinia Tristan. Dillan, PharmD  9/12/2022  09:15 EDT

## 2022-09-12 NOTE — PROGRESS NOTES
Medication Management Clinic  Lipid Management Program - PCSK9i       Sánchez Chandler is a 56 y.o. male referred to the [facility] Management Clinic by [facility] Cassidy for clinical pharmacy and specialty pharmacy management of PCSK9i.  Sánchez Chandler is  treated for hyperlipidemia and currently does not take anything other than Repatha for his cholesterol.  In the past, Pt has tried Lipitor and Zetia and had allergic reaction of hives/rash with Lipitor. The patient denies any allergies to latex.      Relevant Past Medical History and Comorbidities  Past Medical History:   Diagnosis Date   • HTN (hypertension)      Social History     Socioeconomic History   • Marital status:    Tobacco Use   • Smoking status: Never Smoker   • Smokeless tobacco: Never Used   Substance and Sexual Activity   • Alcohol use: No   • Drug use: No   • Sexual activity: Defer       Allergies  Atorvastatin and Penicillins    Current Medication List    Current Outpatient Medications:   •  albuterol sulfate  (90 Base) MCG/ACT inhaler, Inhale 2 puffs Every 4 (Four) Hours As Needed for Wheezing., Disp: 8 g, Rfl: 1  •  Evolocumab with Infusor (Repatha Pushtronex System) solution cartridge, Inject 420 mg under the skin into the appropriate area as directed Every 28 (Twenty-Eight) Days., Disp: 3.5 mL, Rfl: 5  •  Flaxseed, Linseed, (FLAXSEED OIL) 1000 MG capsule, Take 1 capsule by mouth Daily., Disp: , Rfl:   •  metoprolol succinate XL (TOPROL-XL) 50 MG 24 hr tablet, Take 1 tablet by mouth Daily., Disp: 30 tablet, Rfl: 11    Drug Interactions  No known drug drug interactions with Repatha expected according to literature     Relevant Laboratory Values  Lab Results   Component Value Date    CHOL 128 08/23/2022    TRIG 437 (H) 08/23/2022    HDL 24 (L) 08/23/2022    LDL 40 08/23/2022       Medication Assessment & Plan    The patient would like to follow-up in clinic for next injection. Care Coordinator to set up future refill  outreaches, coordinate prescription delivery, and escalate clinical questions to pharmacist.     Most recent LDL is 40 while taking Repatha on 8/23/22. Patient last visit with cardiology was on 7/6/22.   The appointment notes had to continue Repatha.  Pt is currently up to date with lipid panel (from clinic orders) and cardiology appointments. Next cardiology appt scheduled for 1/5/23.    Placed Repatha 420mg on LEFT arm. Pt denies any issues or adverse effects and reports he is still tolerating injection well. Pt will RTC in 1 month for next injection.     As always patient was asked to remain on campus for 15 minutes after infusion. Patient reported feeling well when leaving clinic.     Will follow-up in 1 month for next injection.      Thank you,     Lavinia Tristan. Dillan, PharmD  9/12/2022  08:59 EDT

## 2022-10-07 ENCOUNTER — SPECIALTY PHARMACY (OUTPATIENT)
Dept: PHARMACY | Facility: HOSPITAL | Age: 57
End: 2022-10-07

## 2022-10-07 NOTE — PROGRESS NOTES
"    Specialty Pharmacy Refill Coordination Note      Name:  Sánchez Chandler  :  1965  Date:  10/7/2022         Past Medical History:   Diagnosis Date   • HTN (hypertension)        Past Surgical History:   Procedure Laterality Date   • SKIN GRAFT         Social History     Socioeconomic History   • Marital status:    Tobacco Use   • Smoking status: Never Smoker   • Smokeless tobacco: Never Used   Substance and Sexual Activity   • Alcohol use: No   • Drug use: No   • Sexual activity: Defer       Family History   Problem Relation Age of Onset   • No Known Problems Mother    • No Known Problems Father    • No Known Problems Sister    • No Known Problems Maternal Grandmother    • No Known Problems Maternal Grandfather    • No Known Problems Paternal Grandmother    • No Known Problems Paternal Grandfather        Allergies   Allergen Reactions   • Atorvastatin Hives and Rash     Pt states atorvastatin make \"itchy, red bumps appear all over his body\"   • Penicillins        Current Outpatient Medications   Medication Sig Dispense Refill   • albuterol sulfate  (90 Base) MCG/ACT inhaler Inhale 2 puffs Every 4 (Four) Hours As Needed for Wheezing. 8 g 1   • Evolocumab with Infusor (Repatha Pushtronex System) solution cartridge Inject 420 mg under the skin into the appropriate area as directed Every 28 (Twenty-Eight) Days. 3.5 mL 5   • Flaxseed, Linseed, (FLAXSEED OIL) 1000 MG capsule Take 1 capsule by mouth Daily.     • metoprolol succinate XL (TOPROL-XL) 50 MG 24 hr tablet Take 1 tablet by mouth Daily. 30 tablet 11     No current facility-administered medications for this visit.         ASSESSMENT/PLAN:      Sánchez is a 56 y.o. male contacted today regarding refills of  Repatha 420mg specialty medication(s).    Reviewed and verified with patient:       Specialty medication(s) and dose(s) confirmed: yes    Refill Questions    Flowsheet Row Most Recent Value   Changes to allergies? No   Changes to " medications? No   New conditions since last clinic visit No   Unplanned office visit, urgent care, ED, or hospital admission in the last 4 weeks  No   How does patient/caregiver feel medication is working? Very good   Financial problems or insurance changes  No   If yes, describe changes in insurance or financial issues. none   Since the previous refill, were any specialty medication doses or scheduled injections missed or delayed?  No   If yes, please provide the amount 0   Why were doses missed? n/a   Does this patient require a clinical escalation to a pharmacist? No                Medication Adherence    Adherence tools used: calendar   Other adherence tool: Pt receives injection in clinic monthly   Support network for adherence: healthcare provider          Follow-up: 28 day(s)     Elizabeth Butler, Pharmacy Technician  Specialty Pharmacy Technician

## 2022-10-10 ENCOUNTER — DISEASE STATE MANAGEMENT VISIT (OUTPATIENT)
Dept: PHARMACY | Facility: HOSPITAL | Age: 57
End: 2022-10-10

## 2022-10-10 NOTE — PROGRESS NOTES
Medication Management Clinic  Lipid Management Program - PCSK9i       Sánchez Chandler is a 56 y.o. male referred to the [facility] Management Clinic by [facility] Cassidy for clinical pharmacy and specialty pharmacy management of PCSK9i.  Sánchez Chandler is  treated for hyperlipidemia and currently does not take anything other than Repatha for his cholesterol.  In the past, Pt has tried Lipitor and Zetia and had allergic reaction of hives/rash with Lipitor. The patient denies any allergies to latex.  Patient reports continued tolerance of medication and denies any adverse effects. Patient has reports to clinic for injection and therefore has not missed any doses.     Relevant Past Medical History and Comorbidities  Past Medical History:   Diagnosis Date   • HTN (hypertension)      Social History     Socioeconomic History   • Marital status:    Tobacco Use   • Smoking status: Never   • Smokeless tobacco: Never   Substance and Sexual Activity   • Alcohol use: No   • Drug use: No   • Sexual activity: Defer       Allergies  Atorvastatin and Penicillins    Current Medication List    Current Outpatient Medications:   •  albuterol sulfate  (90 Base) MCG/ACT inhaler, Inhale 2 puffs Every 4 (Four) Hours As Needed for Wheezing., Disp: 8 g, Rfl: 1  •  Evolocumab with Infusor (Repatha Pushtronex System) solution cartridge, Inject 420 mg under the skin into the appropriate area as directed Every 28 (Twenty-Eight) Days., Disp: 3.5 mL, Rfl: 5  •  Flaxseed, Linseed, (FLAXSEED OIL) 1000 MG capsule, Take 1 capsule by mouth Daily., Disp: , Rfl:   •  metoprolol succinate XL (TOPROL-XL) 50 MG 24 hr tablet, Take 1 tablet by mouth Daily., Disp: 30 tablet, Rfl: 11    Drug Interactions  No known drug drug interactions with Repatha expected according to literature     Relevant Laboratory Values  Lab Results   Component Value Date    CHOL 128 08/23/2022    TRIG 437 (H) 08/23/2022    HDL 24 (L) 08/23/2022    LDL 40  08/23/2022       Medication Assessment & Plan    The patient would like to follow-up in clinic for next injection. Care Coordinator to set up future refill outreaches, coordinate prescription delivery, and escalate clinical questions to pharmacist.     Most recent LDL is 40 while taking Repatha on 8/23/22. Patient last visit with cardiology was on 7/6/22.   The appointment notes had to continue Repatha.  Pt is currently up to date with lipid panel (from clinic orders) and cardiology appointments. Next cardiology appt scheduled for 1/5/23.    Placed Repatha 420mg on RIGHT arm. Pt denies any issues or adverse effects and reports he is still tolerating injection well. Pt will RTC in 1 month for next injection.     As always patient was asked to remain on campus for 15 minutes after infusion. Patient reported feeling well when leaving clinic.     Will follow-up in 1 month for next injection.      Thank you,     Lavinia Tristan. Dillan, PharmD  10/10/2022  09:06 EDT

## 2022-11-07 ENCOUNTER — SPECIALTY PHARMACY (OUTPATIENT)
Dept: PHARMACY | Facility: HOSPITAL | Age: 57
End: 2022-11-07

## 2022-11-07 ENCOUNTER — DISEASE STATE MANAGEMENT VISIT (OUTPATIENT)
Dept: PHARMACY | Facility: HOSPITAL | Age: 57
End: 2022-11-07

## 2022-11-07 RX ORDER — EVOLOCUMAB 420 MG/3.5
420 KIT SUBCUTANEOUS
Qty: 3.5 ML | Refills: 5 | Status: SHIPPED | OUTPATIENT
Start: 2022-11-07 | End: 2023-03-27 | Stop reason: SDUPTHER

## 2022-11-07 NOTE — PROGRESS NOTES
[facility]  Lipid Management Program - [facility]       Sánchez Chandler is a 56 y.o. male referred to the [facility] by Caridad Cassidy for clinical pharmacy and specialty pharmacy management of [facility].  Sánchez Chandler is  treated for hyperlipidemia and currently does not take anything other than Repatha for his cholesterol.  In the past, Pt has tried Lipitor and Zetia and had allergic reaction of hives/rash with Lipitor. The patient denies any allergies to latex.  Patient reports continued tolerance of medication and denies any adverse effects. Patient has reports to clinic for injection and therefore has not missed any doses.     Relevant Past Medical History and Comorbidities  Past Medical History:   Diagnosis Date   • HTN (hypertension)      Social History     Socioeconomic History   • Marital status:    Tobacco Use   • Smoking status: Never   • Smokeless tobacco: Never   Substance and Sexual Activity   • Alcohol use: No   • Drug use: No   • Sexual activity: Defer       Allergies  Atorvastatin and Penicillins    Current Medication List    Current Outpatient Medications:   •  albuterol sulfate  (90 Base) MCG/ACT inhaler, Inhale 2 puffs Every 4 (Four) Hours As Needed for Wheezing., Disp: 8 g, Rfl: 1  •  Evolocumab with Infusor (Repatha Pushtronex System) solution cartridge, Inject 420 mg under the skin into the appropriate area as directed Every 28 (Twenty-Eight) Days., Disp: 3.5 mL, Rfl: 5  •  Flaxseed, Linseed, (FLAXSEED OIL) 1000 MG capsule, Take 1 capsule by mouth Daily., Disp: , Rfl:   •  metoprolol succinate XL (TOPROL-XL) 50 MG 24 hr tablet, Take 1 tablet by mouth Daily., Disp: 30 tablet, Rfl: 11    Drug Interactions  No known drug drug interactions with Repatha expected according to literature     Relevant Laboratory Values  Lab Results   Component Value Date    CHOL 128 08/23/2022    TRIG 437 (H) 08/23/2022    HDL 24 (L) 08/23/2022    LDL 40  08/23/2022       Medication Assessment & Plan    The patient would like to follow-up in clinic for next injection. Care Coordinator to set up future refill outreaches, coordinate prescription delivery, and escalate clinical questions to pharmacist.     Most recent LDL is 40 while taking Repatha on 8/23/22. Patient last visit with cardiology was on 7/6/22.   The appointment notes had to continue Repatha.  Pt is currently up to date with lipid panel (from clinic orders) and cardiology appointments. Next cardiology appt scheduled for 1/5/23. Patient sent in 6 month of new refills on 11/7/22.    Placed Repatha 420mg on LEFT arm. Pt denies any issues or adverse effects and reports he is still tolerating injection well. Pt will RTC in 1 month for next injection.     As always patient was asked to remain on campus for 15 minutes after infusion. Patient reported feeling well when leaving clinic.     Will follow-up in 1 month for next injection.      Thank you,     Lavinia Tristan. Dillan, MarthaD  11/7/2022  08:40 EST

## 2022-11-07 NOTE — PROGRESS NOTES
[facility]  Lipid Management Program - [facility]       Sánchez Chandler is a 56 y.o. male referred to the [facility] by Caridad Cassidy for clinical pharmacy and specialty pharmacy management of [facility].  Sánchez Chandler is  treated for hyperlipidemia and currently does not take anything other than Repatha for his cholesterol.  In the past, Pt has tried Lipitor and Zetia and had allergic reaction of hives/rash with Lipitor. The patient denies any allergies to latex.  Patient reports continued tolerance of medication and denies any adverse effects. Patient has reports to clinic for injection and therefore has not missed any doses.     Relevant Past Medical History and Comorbidities  Past Medical History:   Diagnosis Date   • HTN (hypertension)      Social History     Socioeconomic History   • Marital status:    Tobacco Use   • Smoking status: Never   • Smokeless tobacco: Never   Substance and Sexual Activity   • Alcohol use: No   • Drug use: No   • Sexual activity: Defer       Allergies  Atorvastatin and Penicillins    Current Medication List    Current Outpatient Medications:   •  albuterol sulfate  (90 Base) MCG/ACT inhaler, Inhale 2 puffs Every 4 (Four) Hours As Needed for Wheezing., Disp: 8 g, Rfl: 1  •  Evolocumab with Infusor (Repatha Pushtronex System) solution cartridge, Inject 420 mg under the skin into the appropriate area as directed Every 28 (Twenty-Eight) Days., Disp: 3.5 mL, Rfl: 5  •  Flaxseed, Linseed, (FLAXSEED OIL) 1000 MG capsule, Take 1 capsule by mouth Daily., Disp: , Rfl:   •  metoprolol succinate XL (TOPROL-XL) 50 MG 24 hr tablet, Take 1 tablet by mouth Daily., Disp: 30 tablet, Rfl: 11    Drug Interactions  No known drug drug interactions with Repatha expected according to literature     Relevant Laboratory Values  Lab Results   Component Value Date    CHOL 128 08/23/2022    TRIG 437 (H) 08/23/2022    HDL 24 (L) 08/23/2022    LDL 40  08/23/2022       Medication Assessment & Plan    The patient would like to follow-up in clinic for next injection. Care Coordinator to set up future refill outreaches, coordinate prescription delivery, and escalate clinical questions to pharmacist.     Most recent LDL is 40 while taking Repatha on 8/23/22. Patient last visit with cardiology was on 7/6/22.   The appointment notes had to continue Repatha.  Pt is currently up to date with lipid panel (from clinic orders) and cardiology appointments. Next cardiology appt scheduled for 1/5/23. Patient sent in 6 month of new refills on 11/7/22.    Placed Repatha 420mg on LEFT arm. Pt denies any issues or adverse effects and reports he is still tolerating injection well. Pt will RTC in 1 month for next injection.     As always patient was asked to remain on campus for 15 minutes after infusion. Patient reported feeling well when leaving clinic.     Will follow-up in 1 month for next injection.      Thank you,     Lavinia Tristan. Dillan, MarthaD  11/7/2022  09:01 EST

## 2022-12-05 ENCOUNTER — SPECIALTY PHARMACY (OUTPATIENT)
Dept: PHARMACY | Facility: HOSPITAL | Age: 57
End: 2022-12-05

## 2022-12-05 ENCOUNTER — DISEASE STATE MANAGEMENT VISIT (OUTPATIENT)
Dept: PHARMACY | Facility: HOSPITAL | Age: 57
End: 2022-12-05

## 2022-12-05 NOTE — PROGRESS NOTES
[facility]  Lipid Management Program - [facility]       Sánchez Chandler is a 57 y.o. male referred to the [facility] by Caridad Cassidy for clinical pharmacy and specialty pharmacy management of [facility].  Sánchez Chandler is  treated for hyperlipidemia and currently does not take anything other than Repatha for his cholesterol.  In the past, Pt has tried Lipitor and Zetia and had allergic reaction of hives/rash with Lipitor. The patient denies any allergies to latex.  Patient reports continued tolerance of medication and denies any adverse effects. Patient has reports to clinic for injection and therefore has not missed any doses.     Relevant Past Medical History and Comorbidities  Past Medical History:   Diagnosis Date   • HTN (hypertension)      Social History     Socioeconomic History   • Marital status:    Tobacco Use   • Smoking status: Never   • Smokeless tobacco: Never   Substance and Sexual Activity   • Alcohol use: No   • Drug use: No   • Sexual activity: Defer       Allergies  Atorvastatin and Penicillins    Current Medication List    Current Outpatient Medications:   •  albuterol sulfate  (90 Base) MCG/ACT inhaler, Inhale 2 puffs Every 4 (Four) Hours As Needed for Wheezing., Disp: 8 g, Rfl: 1  •  Evolocumab with Infusor (Repatha Pushtronex System) solution cartridge, Inject 420 mg under the skin into the appropriate area as directed Every 28 (Twenty-Eight) Days., Disp: 3.5 mL, Rfl: 5  •  Flaxseed, Linseed, (FLAXSEED OIL) 1000 MG capsule, Take 1 capsule by mouth Daily., Disp: , Rfl:   •  metoprolol succinate XL (TOPROL-XL) 50 MG 24 hr tablet, Take 1 tablet by mouth Daily., Disp: 30 tablet, Rfl: 11    Drug Interactions  No known drug drug interactions with Repatha expected according to literature     Relevant Laboratory Values  Lab Results   Component Value Date    CHOL 128 08/23/2022    TRIG 437 (H) 08/23/2022    HDL 24 (L) 08/23/2022    LDL 40  08/23/2022       Medication Assessment & Plan    The patient would like to follow-up in clinic for next injection. Care Coordinator to set up future refill outreaches, coordinate prescription delivery, and escalate clinical questions to pharmacist.     Most recent LDL is 40 while taking Repatha on 8/23/22. Patient last visit with cardiology was on 7/6/22.   The appointment notes had to continue Repatha.  Pt is currently up to date with lipid panel (from clinic orders) and cardiology appointments. Next cardiology appt scheduled for 1/5/23. Patient sent in 6 month of new refills on 11/7/22.    Placed Repatha 420mg on RIGHT arm. Pt denies any issues or adverse effects and reports he is still tolerating injection well. Pt will RTC in 1 month for next injection.     As always patient was asked to remain on campus for 15 minutes after infusion. Patient reported feeling well when leaving clinic.     Will follow-up in 1 month for next injection.      Thank you,     Lavinia Tristan. Dillan, Aba  12/5/2022  08:52 EST

## 2022-12-05 NOTE — PROGRESS NOTES
[facility]  Lipid Management Program - [facility]       Sánchez Chandler is a 57 y.o. male referred to the [facility] by Caridad Cassidy for clinical pharmacy and specialty pharmacy management of [facility].  Sánchez Chandler is  treated for hyperlipidemia and currently does not take anything other than Repatha for his cholesterol.  In the past, Pt has tried Lipitor and Zetia and had allergic reaction of hives/rash with Lipitor. The patient denies any allergies to latex.  Patient reports continued tolerance of medication and denies any adverse effects. Patient has reports to clinic for injection and therefore has not missed any doses.     Relevant Past Medical History and Comorbidities  Past Medical History:   Diagnosis Date   • HTN (hypertension)      Social History     Socioeconomic History   • Marital status:    Tobacco Use   • Smoking status: Never   • Smokeless tobacco: Never   Substance and Sexual Activity   • Alcohol use: No   • Drug use: No   • Sexual activity: Defer       Allergies  Atorvastatin and Penicillins    Current Medication List    Current Outpatient Medications:   •  albuterol sulfate  (90 Base) MCG/ACT inhaler, Inhale 2 puffs Every 4 (Four) Hours As Needed for Wheezing., Disp: 8 g, Rfl: 1  •  Evolocumab with Infusor (Repatha Pushtronex System) solution cartridge, Inject 420 mg under the skin into the appropriate area as directed Every 28 (Twenty-Eight) Days., Disp: 3.5 mL, Rfl: 5  •  Flaxseed, Linseed, (FLAXSEED OIL) 1000 MG capsule, Take 1 capsule by mouth Daily., Disp: , Rfl:   •  metoprolol succinate XL (TOPROL-XL) 50 MG 24 hr tablet, Take 1 tablet by mouth Daily., Disp: 30 tablet, Rfl: 11    Drug Interactions  No known drug drug interactions with Repatha expected according to literature     Relevant Laboratory Values  Lab Results   Component Value Date    CHOL 128 08/23/2022    TRIG 437 (H) 08/23/2022    HDL 24 (L) 08/23/2022    LDL 40  08/23/2022       Medication Assessment & Plan    The patient would like to follow-up in clinic for next injection. Care Coordinator to set up future refill outreaches, coordinate prescription delivery, and escalate clinical questions to pharmacist.     Most recent LDL is 40 while taking Repatha on 8/23/22. Patient last visit with cardiology was on 7/6/22.   The appointment notes had to continue Repatha.  Pt is currently up to date with lipid panel (from clinic orders) and cardiology appointments. Next cardiology appt scheduled for 1/5/23. Patient sent in 6 month of new refills on 11/7/22.    Placed Repatha 420mg on RIGHT arm. Pt denies any issues or adverse effects and reports he is still tolerating injection well. Pt will RTC in 1 month for next injection.     As always patient was asked to remain on campus for 15 minutes after infusion. Patient reported feeling well when leaving clinic.     Will follow-up in 1 month for next injection.      Thank you,     Lavinia Tristan. Dillan, Aba  12/5/2022  09:21 EST

## 2022-12-28 ENCOUNTER — SPECIALTY PHARMACY (OUTPATIENT)
Dept: PHARMACY | Facility: HOSPITAL | Age: 57
End: 2022-12-28
Payer: COMMERCIAL

## 2022-12-28 NOTE — PROGRESS NOTES
Specialty Pharmacy Refill Coordination Note      Name:  Sánchez Chandler  :  1965  Date:  2022         Past Medical History:   Diagnosis Date   • HTN (hypertension)        Past Surgical History:   Procedure Laterality Date   • SKIN GRAFT         Social History     Socioeconomic History   • Marital status:    Tobacco Use   • Smoking status: Never   • Smokeless tobacco: Never   Substance and Sexual Activity   • Alcohol use: No   • Drug use: No   • Sexual activity: Defer       Family History   Problem Relation Age of Onset   • No Known Problems Mother    • No Known Problems Father    • No Known Problems Sister    • No Known Problems Maternal Grandmother    • No Known Problems Maternal Grandfather    • No Known Problems Paternal Grandmother    • No Known Problems Paternal Grandfather        Allergies   Allergen Reactions   • Atorvastatin Hives and Rash     Pt states atorvastatin make \"itchy, red bumps appear all over his body\"   • Penicillins        Current Outpatient Medications   Medication Sig Dispense Refill   • albuterol sulfate  (90 Base) MCG/ACT inhaler Inhale 2 puffs Every 4 (Four) Hours As Needed for Wheezing. 8 g 1   • Evolocumab with Infusor (Repatha Pushtronex System) solution cartridge Inject 420 mg under the skin into the appropriate area as directed Every 28 (Twenty-Eight) Days. 3.5 mL 5   • Flaxseed, Linseed, (FLAXSEED OIL) 1000 MG capsule Take 1 capsule by mouth Daily.     • metoprolol succinate XL (TOPROL-XL) 50 MG 24 hr tablet Take 1 tablet by mouth Daily. 30 tablet 11     No current facility-administered medications for this visit.         ASSESSMENT/PLAN:      Sánchez is a 57 y.o. male contacted today regarding refills of  repatha pushtronex injection  specialty medication(s).    Reviewed and verified with patient:       Specialty medication(s) and dose(s) confirmed: yes    Refill Questions    Flowsheet Row Most Recent Value   Changes to allergies? No   Changes to  medications? No   New conditions since last clinic visit No   Unplanned office visit, urgent care, ED, or hospital admission in the last 4 weeks  No   How does patient/caregiver feel medication is working? Very good   Financial problems or insurance changes  No   If yes, describe changes in insurance or financial issues. no   Since the previous refill, were any specialty medication doses or scheduled injections missed or delayed?  No   If yes, please provide the amount no   Why were doses missed? 0   Does this patient require a clinical escalation to a pharmacist? No                Medication Adherence    Adherence tools used: calendar   Other adherence tool: Pt receives injection in clinic monthly   Support network for adherence: healthcare provider          Follow-up: 28 day(s)     Leila Escalera, Pharmacy Technician  Specialty Pharmacy Technician

## 2023-01-03 ENCOUNTER — DISEASE STATE MANAGEMENT VISIT (OUTPATIENT)
Dept: PHARMACY | Facility: HOSPITAL | Age: 58
End: 2023-01-03
Payer: COMMERCIAL

## 2023-01-03 NOTE — PROGRESS NOTES
[facility]  Lipid Management Program - [facility]     Sánchez Chandler is a 57 y.o. male referred to the [facility] by Caridad Cassidy for clinical pharmacy and specialty pharmacy management of [facility].  Sánchez Chandler is  treated for hyperlipidemia and currently does not take anything other than Repatha for his cholesterol.  In the past, Pt has tried Lipitor and Zetia and had allergic reaction of hives/rash with Lipitor. The patient denies any allergies to latex.  Patient reports continued tolerance of medication and denies any adverse effects. Patient has reports to clinic for injection and therefore has not missed any doses.     Relevant Past Medical History and Comorbidities  Past Medical History:   Diagnosis Date   • HTN (hypertension)      Social History     Socioeconomic History   • Marital status:    Tobacco Use   • Smoking status: Never   • Smokeless tobacco: Never   Substance and Sexual Activity   • Alcohol use: No   • Drug use: No   • Sexual activity: Defer       Allergies  Atorvastatin and Penicillins    Current Medication List    Current Outpatient Medications:   •  albuterol sulfate  (90 Base) MCG/ACT inhaler, Inhale 2 puffs Every 4 (Four) Hours As Needed for Wheezing., Disp: 8 g, Rfl: 1  •  Evolocumab with Infusor (Repatha Pushtronex System) solution cartridge, Inject 420 mg under the skin into the appropriate area as directed Every 28 (Twenty-Eight) Days., Disp: 3.5 mL, Rfl: 5  •  Flaxseed, Linseed, (FLAXSEED OIL) 1000 MG capsule, Take 1 capsule by mouth Daily., Disp: , Rfl:   •  metoprolol succinate XL (TOPROL-XL) 50 MG 24 hr tablet, Take 1 tablet by mouth Daily., Disp: 30 tablet, Rfl: 11    Drug Interactions  No known drug drug interactions with Repatha expected according to literature     Relevant Laboratory Values  Lab Results   Component Value Date    CHOL 128 08/23/2022    TRIG 437 (H) 08/23/2022    HDL 24 (L) 08/23/2022    LDL 40 08/23/2022        Medication Assessment & Plan  The patient would like to follow-up in clinic for next injection. Care Coordinator to set up future refill outreaches, coordinate prescription delivery, and escalate clinical questions to pharmacist.     Most recent LDL is 40 while taking Repatha on 8/23/22.  Pt is currently up to date with lipid panel (from clinic orders) and cardiology appointments. Next cardiology appt scheduled for 1/5/23.     Placed Repatha 420mg on LEFT arm. Pt denies any issues or adverse effects and reports he is still tolerating injection well.     As always patient was asked to remain on campus for 15 minutes after infusion. Patient reported feeling well when leaving clinic.     Will follow-up in 1 month for next injection.    Thank you,   Alma Rosa Serna, PharmD  1/3/2023  08:36 EST

## 2023-01-05 ENCOUNTER — OFFICE VISIT (OUTPATIENT)
Dept: CARDIOLOGY | Facility: CLINIC | Age: 58
End: 2023-01-05
Payer: COMMERCIAL

## 2023-01-05 VITALS
WEIGHT: 245.2 LBS | HEART RATE: 91 BPM | DIASTOLIC BLOOD PRESSURE: 94 MMHG | OXYGEN SATURATION: 95 % | HEIGHT: 72 IN | BODY MASS INDEX: 33.21 KG/M2 | SYSTOLIC BLOOD PRESSURE: 167 MMHG

## 2023-01-05 DIAGNOSIS — E78.5 DYSLIPIDEMIA: ICD-10-CM

## 2023-01-05 DIAGNOSIS — I10 ESSENTIAL HYPERTENSION: Primary | ICD-10-CM

## 2023-01-05 PROCEDURE — 99214 OFFICE O/P EST MOD 30 MIN: CPT | Performed by: NURSE PRACTITIONER

## 2023-01-05 RX ORDER — METOPROLOL SUCCINATE 25 MG/1
25 TABLET, EXTENDED RELEASE ORAL DAILY
Qty: 30 TABLET | Refills: 5 | Status: SHIPPED | OUTPATIENT
Start: 2023-01-05

## 2023-01-05 RX ORDER — AMLODIPINE BESYLATE 5 MG/1
5 TABLET ORAL DAILY
Qty: 30 TABLET | Refills: 11 | Status: SHIPPED | OUTPATIENT
Start: 2023-01-05 | End: 2023-04-05 | Stop reason: SDUPTHER

## 2023-01-05 NOTE — PROGRESS NOTES
Oliverio Escobar MD  Sánchez Chandler  1965 01/05/2023    Patient Active Problem List   Diagnosis   • Essential hypertension   • History of chest pain   • Dyslipidemia       Dear Oliverio Escobar MD:    Subjective     Chief Complaint   Patient presents with   • Follow-up     6 mth, labs   • Med Management     bottles           History of Present Illness:    Sánchez Chandler is a 57 y.o. male with a past medical history of hypertension and dyslipidemia. He presents today for routine cardiology follow up. Reports he has been doing well. The patient denies chest pain, shortness of breath, palpitations, dizziness, lightheadedness, near syncope, and syncope. His most recent LDL was 40. He is tolerating PCSK9 inhibitor well. He has been eating a lot of high sodium foods including sausage. He does not check his BP at home.            Allergies   Allergen Reactions   • Atorvastatin Hives and Rash     Pt states atorvastatin make \"itchy, red bumps appear all over his body\"   • Penicillins    :      Current Outpatient Medications:   •  albuterol sulfate  (90 Base) MCG/ACT inhaler, Inhale 2 puffs Every 4 (Four) Hours As Needed for Wheezing., Disp: 8 g, Rfl: 1  •  Evolocumab with Infusor (Repatha Pushtronex System) solution cartridge, Inject 420 mg under the skin into the appropriate area as directed Every 28 (Twenty-Eight) Days., Disp: 3.5 mL, Rfl: 5  •  Flaxseed, Linseed, (FLAXSEED OIL) 1000 MG capsule, Take 1 capsule by mouth Daily., Disp: , Rfl:   •  metoprolol succinate XL (TOPROL-XL) 25 MG 24 hr tablet, Take 1 tablet by mouth Daily., Disp: 30 tablet, Rfl: 5  •  amLODIPine (NORVASC) 5 MG tablet, Take 1 tablet by mouth Daily., Disp: 30 tablet, Rfl: 11      The following portions of the patient's history were reviewed and updated as appropriate: allergies, current medications, past family history, past medical history, past social history, past surgical history and problem list.    Social History      Tobacco Use   • Smoking status: Never   • Smokeless tobacco: Never   Substance Use Topics   • Alcohol use: No   • Drug use: No       Review of Systems   Constitutional: Negative for decreased appetite and malaise/fatigue.   Cardiovascular: Negative for chest pain, dyspnea on exertion and palpitations.   Respiratory: Negative for cough and shortness of breath.        Objective   Vitals:    01/05/23 0846   BP: 167/94   BP Location: Left arm   Patient Position: Sitting   Cuff Size: Adult   Pulse: 91   SpO2: 95%   Weight: 111 kg (245 lb 3.2 oz)   Height: 182.9 cm (72.01\")     Body mass index is 33.25 kg/m².        Vitals reviewed.   Constitutional:       Appearance: Healthy appearance. Well-developed and not in distress.   HENT:      Head: Normocephalic and atraumatic.   Pulmonary:      Effort: Pulmonary effort is normal.      Breath sounds: Normal breath sounds. No wheezing. No rales.   Cardiovascular:      Normal rate. Regular rhythm.      Murmurs: There is no murmur.      . No S3 and S4 gallop.   Edema:     Peripheral edema absent.   Abdominal:      General: Bowel sounds are normal.      Palpations: Abdomen is soft.   Skin:     General: Skin is warm and dry.   Neurological:      Mental Status: Alert, oriented to person, place, and time and oriented to person, place and time.   Psychiatric:         Mood and Affect: Mood normal.         Behavior: Behavior normal.         Lab Results   Component Value Date     08/23/2022    K 4.4 08/23/2022     08/23/2022    CO2 20.0 (L) 08/23/2022    BUN 18 08/23/2022    CREATININE 1.01 08/23/2022    GLUCOSE 102 (H) 08/23/2022    CALCIUM 9.4 08/23/2022    AST 19 08/23/2022    ALT 21 08/23/2022    ALKPHOS 77 08/23/2022     No results found for: CKTOTAL  No results found for: WBC, HGB, HCT, PLT  No results found for: INR  No results found for: MG  Lab Results   Component Value Date    TRIG 437 (H) 08/23/2022    HDL 24 (L) 08/23/2022    LDL 40 08/23/2022      No results  found for: BNP        Procedures      Assessment & Plan    Diagnosis Plan   1. Essential hypertension  metoprolol succinate XL (TOPROL-XL) 25 MG 24 hr tablet    amLODIPine (NORVASC) 5 MG tablet      2. Dyslipidemia                     Recommendations:    1. Essential hypertension - continue metoprolol. I did recommend increasing metoprolol dose for his uncontrolled hypertension, but he states higher doses cause headaches. Will add amlodipine 5 mg daily. I did advise him to cut back on his sodium intake.  2. Dyslipidemia - on Repatha. LDL at goal.  3. Follow up in 3 months or sooner if needed.         Return in about 3 months (around 4/5/2023) for Recheck.    As always, I appreciate very much the opportunity to participate in the cardiovascular care of your patients.      With Best Regards,    JULIÁN Paz

## 2023-01-05 NOTE — LETTER
January 5, 2023     Oliverio Escobar MD  [address] [place] [id number]    Patient: Sánchez Chandler   YOB: 1965   Date of Visit: 1/5/2023       Dear Dr. Luz MD:    Thank you for referring Sánchez Chandler to me for evaluation. Below are the relevant portions of my assessment and plan of care.    If you have questions, please do not hesitate to call me. I look forward to following Sánchez along with you.         Sincerely,        JULIÁN Paz        CC: No Recipients  Caridad Cassidy APRN  01/05/23 0927  Signed  Oliverio Escobar MD  Sánchez Chandler  1965 01/05/2023    Patient Active Problem List   Diagnosis   • Essential hypertension   • History of chest pain   • Dyslipidemia       Dear Oliverio Escobar MD:    Subjective      Chief Complaint   Patient presents with   • Follow-up     6 mth, labs   • Med Management     bottles           History of Present Illness:    Sánchez Chandler is a 57 y.o. male with a past medical history of hypertension and dyslipidemia. He presents today for routine cardiology follow up. Reports he has been doing well. The patient denies chest pain, shortness of breath, palpitations, dizziness, lightheadedness, near syncope, and syncope. His most recent LDL was 40. He is tolerating PCSK9 inhibitor well. He has been eating a lot of high sodium foods including sausage. He does not check his BP at home.            Allergies   Allergen Reactions   • Atorvastatin Hives and Rash     Pt states atorvastatin make \"itchy, red bumps appear all over his body\"   • Penicillins    :      Current Outpatient Medications:   •  albuterol sulfate  (90 Base) MCG/ACT inhaler, Inhale 2 puffs Every 4 (Four) Hours As Needed for Wheezing., Disp: 8 g, Rfl: 1  •  Evolocumab with Infusor (Repatha Pushtronex System) solution cartridge, Inject 420 mg under the skin into the appropriate area as directed Every 28 (Twenty-Eight) Days., Disp: 3.5 mL, Rfl: 5  •   Flaxseed, Linseed, (FLAXSEED OIL) 1000 MG capsule, Take 1 capsule by mouth Daily., Disp: , Rfl:   •  metoprolol succinate XL (TOPROL-XL) 25 MG 24 hr tablet, Take 1 tablet by mouth Daily., Disp: 30 tablet, Rfl: 5  •  amLODIPine (NORVASC) 5 MG tablet, Take 1 tablet by mouth Daily., Disp: 30 tablet, Rfl: 11      The following portions of the patient's history were reviewed and updated as appropriate: allergies, current medications, past family history, past medical history, past social history, past surgical history and problem list.    Social History     Tobacco Use   • Smoking status: Never   • Smokeless tobacco: Never   Substance Use Topics   • Alcohol use: No   • Drug use: No       Review of Systems   Constitutional: Negative for decreased appetite and malaise/fatigue.   Cardiovascular: Negative for chest pain, dyspnea on exertion and palpitations.   Respiratory: Negative for cough and shortness of breath.        Objective    Vitals:    01/05/23 0846   BP: 167/94   BP Location: Left arm   Patient Position: Sitting   Cuff Size: Adult   Pulse: 91   SpO2: 95%   Weight: 111 kg (245 lb 3.2 oz)   Height: 182.9 cm (72.01\")     Body mass index is 33.25 kg/m².        Vitals reviewed.   Constitutional:       Appearance: Healthy appearance. Well-developed and not in distress.   HENT:      Head: Normocephalic and atraumatic.   Pulmonary:      Effort: Pulmonary effort is normal.      Breath sounds: Normal breath sounds. No wheezing. No rales.   Cardiovascular:      Normal rate. Regular rhythm.      Murmurs: There is no murmur.      . No S3 and S4 gallop.   Edema:     Peripheral edema absent.   Abdominal:      General: Bowel sounds are normal.      Palpations: Abdomen is soft.   Skin:     General: Skin is warm and dry.   Neurological:      Mental Status: Alert, oriented to person, place, and time and oriented to person, place and time.   Psychiatric:         Mood and Affect: Mood normal.         Behavior: Behavior normal.          Lab Results   Component Value Date     08/23/2022    K 4.4 08/23/2022     08/23/2022    CO2 20.0 (L) 08/23/2022    BUN 18 08/23/2022    CREATININE 1.01 08/23/2022    GLUCOSE 102 (H) 08/23/2022    CALCIUM 9.4 08/23/2022    AST 19 08/23/2022    ALT 21 08/23/2022    ALKPHOS 77 08/23/2022     No results found for: CKTOTAL  No results found for: WBC, HGB, HCT, PLT  No results found for: INR  No results found for: MG  Lab Results   Component Value Date    TRIG 437 (H) 08/23/2022    HDL 24 (L) 08/23/2022    LDL 40 08/23/2022      No results found for: BNP        Procedures      Assessment & Plan    Diagnosis Plan   1. Essential hypertension  metoprolol succinate XL (TOPROL-XL) 25 MG 24 hr tablet    amLODIPine (NORVASC) 5 MG tablet      2. Dyslipidemia                    Recommendations:    1. Essential hypertension - continue metoprolol. I did recommend increasing metoprolol dose for his uncontrolled hypertension, but he states higher doses cause headaches. Will add amlodipine 5 mg daily. I did advise him to cut back on his sodium intake.  2. Dyslipidemia - on Repatha. LDL at goal.  3. Follow up in 3 months or sooner if needed.         Return in about 3 months (around 4/5/2023) for Recheck.    As always, I appreciate very much the opportunity to participate in the cardiovascular care of your patients.      With Best Regards,    JULIÁN Paz

## 2023-01-25 ENCOUNTER — SPECIALTY PHARMACY (OUTPATIENT)
Dept: PHARMACY | Facility: HOSPITAL | Age: 58
End: 2023-01-25
Payer: COMMERCIAL

## 2023-01-25 NOTE — PROGRESS NOTES
"    Specialty Pharmacy Refill Coordination Note      Name:  Sánchez Chandler  :  1965  Date:  2023         Past Medical History:   Diagnosis Date   • HTN (hypertension)        Past Surgical History:   Procedure Laterality Date   • SKIN GRAFT         Social History     Socioeconomic History   • Marital status:    Tobacco Use   • Smoking status: Never   • Smokeless tobacco: Never   Substance and Sexual Activity   • Alcohol use: No   • Drug use: No   • Sexual activity: Defer       Family History   Problem Relation Age of Onset   • No Known Problems Mother    • No Known Problems Father    • No Known Problems Sister    • No Known Problems Maternal Grandmother    • No Known Problems Maternal Grandfather    • No Known Problems Paternal Grandmother    • No Known Problems Paternal Grandfather        Allergies   Allergen Reactions   • Atorvastatin Hives and Rash     Pt states atorvastatin make \"itchy, red bumps appear all over his body\"   • Penicillins        Current Outpatient Medications   Medication Sig Dispense Refill   • albuterol sulfate  (90 Base) MCG/ACT inhaler Inhale 2 puffs Every 4 (Four) Hours As Needed for Wheezing. 8 g 1   • amLODIPine (NORVASC) 5 MG tablet Take 1 tablet by mouth Daily. 30 tablet 11   • Evolocumab with Infusor (Repatha Pushtronex System) solution cartridge Inject 420 mg under the skin into the appropriate area as directed Every 28 (Twenty-Eight) Days. 3.5 mL 5   • Flaxseed, Linseed, (FLAXSEED OIL) 1000 MG capsule Take 1 capsule by mouth Daily.     • metoprolol succinate XL (TOPROL-XL) 25 MG 24 hr tablet Take 1 tablet by mouth Daily. 30 tablet 5     No current facility-administered medications for this visit.         ASSESSMENT/PLAN:      Sánchez is a 57 y.o. male contacted today regarding refills of  Repatha pushtronex injection  specialty medication(s).    Reviewed and verified with patient:       Specialty medication(s) and dose(s) confirmed: yes    Refill Questions  "   Flowsheet Row Most Recent Value   Changes to allergies? No   Changes to medications? No   New conditions since last clinic visit No   Unplanned office visit, urgent care, ED, or hospital admission in the last 4 weeks  No   How does patient/caregiver feel medication is working? Very good   Financial problems or insurance changes  No   If yes, describe changes in insurance or financial issues. no   Since the previous refill, were any specialty medication doses or scheduled injections missed or delayed?  No   If yes, please provide the amount 0   Why were doses missed? n/a   Does this patient require a clinical escalation to a pharmacist? No                Medication Adherence    Adherence tools used: calendar   Other adherence tool: Pt receives injection in clinic monthly   Support network for adherence: healthcare provider          Follow-up: 28 day(s)     Leila Escalera, Pharmacy Technician  Specialty Pharmacy Technician

## 2023-01-30 ENCOUNTER — DISEASE STATE MANAGEMENT VISIT (OUTPATIENT)
Dept: PHARMACY | Facility: HOSPITAL | Age: 58
End: 2023-01-30
Payer: COMMERCIAL

## 2023-01-30 NOTE — PROGRESS NOTES
[facility]  Lipid Management Program - PCSK9i     Sánchez Chandler is a 57 y.o. male referred to the [facility] by Caridad Cassidy for clinical pharmacy and specialty pharmacy management of PCSK9i.  Sánchez Chandler is  treated for hyperlipidemia and currently does not take anything other than Repatha for his cholesterol.  In the past, Pt has tried Lipitor and Zetia and had allergic reaction of hives/rash with Lipitor. The patient denies any allergies to latex.  Patient reports continued tolerance of medication and denies any adverse effects. Patient has reports to clinic for injection and therefore has not missed any doses.     Relevant Past Medical History and Comorbidities  Past Medical History:   Diagnosis Date   • HTN (hypertension)      Social History     Socioeconomic History   • Marital status:    Tobacco Use   • Smoking status: Never   • Smokeless tobacco: Never   Substance and Sexual Activity   • Alcohol use: No   • Drug use: No   • Sexual activity: Defer       Allergies  Atorvastatin and Penicillins    Current Medication List    Current Outpatient Medications:   •  albuterol sulfate  (90 Base) MCG/ACT inhaler, Inhale 2 puffs Every 4 (Four) Hours As Needed for Wheezing., Disp: 8 g, Rfl: 1  •  amLODIPine (NORVASC) 5 MG tablet, Take 1 tablet by mouth Daily., Disp: 30 tablet, Rfl: 11  •  Evolocumab with Infusor (Repatha Pushtronex System) solution cartridge, Inject 420 mg under the skin into the appropriate area as directed Every 28 (Twenty-Eight) Days., Disp: 3.5 mL, Rfl: 5  •  Flaxseed, Linseed, (FLAXSEED OIL) 1000 MG capsule, Take 1 capsule by mouth Daily., Disp: , Rfl:   •  metoprolol succinate XL (TOPROL-XL) 25 MG 24 hr tablet, Take 1 tablet by mouth Daily., Disp: 30 tablet, Rfl: 5    Drug Interactions  No known drug drug interactions with Repatha expected according to literature     Relevant Laboratory Values  Lab Results   Component Value Date     CHOL 128 08/23/2022    TRIG 437 (H) 08/23/2022    HDL 24 (L) 08/23/2022    LDL 40 08/23/2022       Medication Assessment & Plan  The patient would like to follow-up in clinic for next injection. Care Coordinator to set up future refill outreaches, coordinate prescription delivery, and escalate clinical questions to pharmacist.     Most recent LDL is 40 while taking Repatha.     Placed Repatha 420mg on RIGHT arm. Pt denies any issues or adverse effects and reports he is still tolerating injection well.     As always patient was asked to remain on campus for 15 minutes after infusion. Patient reported feeling well when leaving clinic.     Will follow-up in 1 month for next injection.    Thank you,   Alma Rosa Serna, PharmD  1/30/2023  13:54 EST

## 2023-02-20 ENCOUNTER — SPECIALTY PHARMACY (OUTPATIENT)
Dept: PHARMACY | Facility: HOSPITAL | Age: 58
End: 2023-02-20
Payer: COMMERCIAL

## 2023-02-27 ENCOUNTER — DISEASE STATE MANAGEMENT VISIT (OUTPATIENT)
Dept: PHARMACY | Facility: HOSPITAL | Age: 58
End: 2023-02-27
Payer: COMMERCIAL

## 2023-02-27 NOTE — PROGRESS NOTES
[facility]  Lipid Management Program - PCSK9i     Sánchez Chandler is a 57 y.o. male referred to the [facility] by Caridad Cassidy for clinical pharmacy and specialty pharmacy management of PCSK9i.  Sánchez Chandler is  treated for hyperlipidemia and currently does not take anything other than Repatha for his cholesterol.  In the past, Pt has tried Lipitor and Zetia and had allergic reaction of hives/rash with Lipitor. The patient denies any allergies to latex.  Patient reports continued tolerance of medication and denies any adverse effects. Patient has reports to clinic for injection and therefore has not missed any doses.     Relevant Past Medical History and Comorbidities  Past Medical History:   Diagnosis Date   • HTN (hypertension)      Social History     Socioeconomic History   • Marital status:    Tobacco Use   • Smoking status: Never   • Smokeless tobacco: Never   Substance and Sexual Activity   • Alcohol use: No   • Drug use: No   • Sexual activity: Defer       Allergies  Atorvastatin and Penicillins    Current Medication List    Current Outpatient Medications:   •  albuterol sulfate  (90 Base) MCG/ACT inhaler, Inhale 2 puffs Every 4 (Four) Hours As Needed for Wheezing., Disp: 8 g, Rfl: 1  •  amLODIPine (NORVASC) 5 MG tablet, Take 1 tablet by mouth Daily., Disp: 30 tablet, Rfl: 11  •  Evolocumab with Infusor (Repatha Pushtronex System) solution cartridge, Inject 420 mg under the skin into the appropriate area as directed Every 28 (Twenty-Eight) Days., Disp: 3.5 mL, Rfl: 5  •  Flaxseed, Linseed, (FLAXSEED OIL) 1000 MG capsule, Take 1 capsule by mouth Daily., Disp: , Rfl:   •  metoprolol succinate XL (TOPROL-XL) 25 MG 24 hr tablet, Take 1 tablet by mouth Daily., Disp: 30 tablet, Rfl: 5    Drug Interactions  No known drug drug interactions with Repatha expected according to literature     Relevant Laboratory Values  Lab Results   Component Value Date     CHOL 128 08/23/2022    TRIG 437 (H) 08/23/2022    HDL 24 (L) 08/23/2022    LDL 40 08/23/2022       Medication Assessment & Plan  The patient would like to follow-up in clinic for next injection. Care Coordinator to set up future refill outreaches, coordinate prescription delivery, and escalate clinical questions to pharmacist.     Most recent LDL is 40 while taking Repatha.     Placed Repatha 420mg on LEFT arm. Pt denies any issues or adverse effects and reports he is still tolerating injection well.     As always patient was asked to remain on campus for 15 minutes after infusion. Patient reported feeling well when leaving clinic.     Will follow-up in 1 month for next injection.    Thank you,   Lavinia Tristan. Dillan, PharmD  2/27/2023  08:59 EST

## 2023-03-21 ENCOUNTER — SPECIALTY PHARMACY (OUTPATIENT)
Dept: PHARMACY | Facility: HOSPITAL | Age: 58
End: 2023-03-21
Payer: COMMERCIAL

## 2023-03-21 NOTE — PROGRESS NOTES
"    Specialty Pharmacy Refill Coordination Note      Name:  Sánchez Chandler  :  1965  Date:  3/21/2023         Past Medical History:   Diagnosis Date   • HTN (hypertension)        Past Surgical History:   Procedure Laterality Date   • SKIN GRAFT         Social History     Socioeconomic History   • Marital status:    Tobacco Use   • Smoking status: Never   • Smokeless tobacco: Never   Substance and Sexual Activity   • Alcohol use: No   • Drug use: No   • Sexual activity: Defer       Family History   Problem Relation Age of Onset   • No Known Problems Mother    • No Known Problems Father    • No Known Problems Sister    • No Known Problems Maternal Grandmother    • No Known Problems Maternal Grandfather    • No Known Problems Paternal Grandmother    • No Known Problems Paternal Grandfather        Allergies   Allergen Reactions   • Atorvastatin Hives and Rash     Pt states atorvastatin make \"itchy, red bumps appear all over his body\"   • Penicillins        Current Outpatient Medications   Medication Sig Dispense Refill   • albuterol sulfate  (90 Base) MCG/ACT inhaler Inhale 2 puffs Every 4 (Four) Hours As Needed for Wheezing. 8 g 1   • amLODIPine (NORVASC) 5 MG tablet Take 1 tablet by mouth Daily. 30 tablet 11   • Evolocumab with Infusor (Repatha Pushtronex System) solution cartridge Inject 420 mg under the skin into the appropriate area as directed Every 28 (Twenty-Eight) Days. 3.5 mL 5   • Flaxseed, Linseed, (FLAXSEED OIL) 1000 MG capsule Take 1 capsule by mouth Daily.     • metoprolol succinate XL (TOPROL-XL) 25 MG 24 hr tablet Take 1 tablet by mouth Daily. 30 tablet 5     No current facility-administered medications for this visit.         ASSESSMENT/PLAN:      Sánchez \"SHELLEY\" is a 57 y.o. male contacted today regarding refills of  Repatha pushtronex injection  specialty medication(s).    Reviewed and verified with patient:       Specialty medication(s) and dose(s) confirmed: yes    Refill " Questions    Flowsheet Row Most Recent Value   Changes to allergies? No   Changes to medications? No   New conditions since last clinic visit No   Unplanned office visit, urgent care, ED, or hospital admission in the last 4 weeks  No   How does patient/caregiver feel medication is working? Very good   Financial problems or insurance changes  No   If yes, describe changes in insurance or financial issues. no   Since the previous refill, were any specialty medication doses or scheduled injections missed or delayed?  No   If yes, please provide the amount 0   Why were doses missed? n/a   Does this patient require a clinical escalation to a pharmacist? No                Medication Adherence    Adherence tools used: calendar   Other adherence tool: Pt receives injection in clinic monthly   Support network for adherence: healthcare provider          Follow-up: 28 day(s)     Leila Escalera, Pharmacy Technician  Specialty Pharmacy Technician

## 2023-03-27 ENCOUNTER — DISEASE STATE MANAGEMENT VISIT (OUTPATIENT)
Dept: PHARMACY | Facility: HOSPITAL | Age: 58
End: 2023-03-27
Payer: COMMERCIAL

## 2023-03-27 ENCOUNTER — SPECIALTY PHARMACY (OUTPATIENT)
Dept: PHARMACY | Facility: HOSPITAL | Age: 58
End: 2023-03-27
Payer: COMMERCIAL

## 2023-03-27 DIAGNOSIS — E78.5 DYSLIPIDEMIA: Primary | ICD-10-CM

## 2023-03-27 RX ORDER — EVOLOCUMAB 420 MG/3.5
420 KIT SUBCUTANEOUS
Qty: 3.5 ML | Refills: 5 | Status: SHIPPED | OUTPATIENT
Start: 2023-03-27

## 2023-03-27 RX ORDER — ACETAMINOPHEN 500 MG
500 TABLET ORAL 2 TIMES DAILY PRN
COMMUNITY

## 2023-03-27 NOTE — PROGRESS NOTES
[facility]  Lipid Management Program - PCSK9i     Sánchez Chandler is a 57 y.o. male referred to the [facility] by Caridad Cassidy for clinical pharmacy and specialty pharmacy management of PCSK9i.  Sánchez Chandler is  treated for hyperlipidemia and currently does not take anything other than Repatha for his cholesterol.  In the past, Pt has tried Lipitor and Zetia and had allergic reaction of hives/rash with Lipitor. The patient denies any allergies to latex.  Patient reports continued tolerance of medication and denies any adverse effects. Patient has reports to clinic for injection and therefore has not missed any doses.     Relevant Past Medical History and Comorbidities  Past Medical History:   Diagnosis Date   • HTN (hypertension)      Social History     Socioeconomic History   • Marital status:    Tobacco Use   • Smoking status: Never   • Smokeless tobacco: Never   Substance and Sexual Activity   • Alcohol use: No   • Drug use: No   • Sexual activity: Defer       Allergies  Atorvastatin and Penicillins    Current Medication List    Current Outpatient Medications:   •  acetaminophen (TYLENOL) 500 MG tablet, Take 1 tablet by mouth 2 (Two) Times a Day As Needed for Mild Pain., Disp: , Rfl:   •  albuterol sulfate  (90 Base) MCG/ACT inhaler, Inhale 2 puffs Every 4 (Four) Hours As Needed for Wheezing., Disp: 8 g, Rfl: 1  •  amLODIPine (NORVASC) 5 MG tablet, Take 1 tablet by mouth Daily., Disp: 30 tablet, Rfl: 11  •  Evolocumab with Infusor (Repatha Pushtronex System) solution cartridge, Inject 420 mg under the skin into the appropriate area as directed Every 28 (Twenty-Eight) Days., Disp: 3.5 mL, Rfl: 5  •  Flaxseed, Linseed, (FLAXSEED OIL) 1000 MG capsule, Take 1 capsule by mouth Daily., Disp: , Rfl:   •  metoprolol succinate XL (TOPROL-XL) 25 MG 24 hr tablet, Take 1 tablet by mouth Daily., Disp: 30 tablet, Rfl: 5    Drug Interactions  No known drug drug  interactions with Repatha expected according to literature     Relevant Laboratory Values  Lab Results   Component Value Date    CHOL 128 08/23/2022    TRIG 437 (H) 08/23/2022    HDL 24 (L) 08/23/2022    LDL 40 08/23/2022       Medication Assessment & Plan  The patient would like to follow-up in clinic for next injection. Care Coordinator to set up future refill outreaches, coordinate prescription delivery, and escalate clinical questions to pharmacist.     Most recent LDL is 40 while taking Repatha.     Placed Repatha 420mg on RIGHT arm. Pt denies any issues or adverse effects and reports he is still tolerating injection well. Since patient has been tolerating the medication well with no issues, will place a new order for Repatha Pushtronex with refills. Will place lipid panel order, recommended patient to complete lipid panel after cardiology appointment on 4/5/23.     As always patient was asked to remain on campus for 15 minutes after infusion. Patient reported feeling well when leaving clinic.     Will follow-up in 1 month for next injection.    Thank you,   Lavinia Houston [facility]  3/27/2023  09:06 EDT

## 2023-03-27 NOTE — PROGRESS NOTES
[facility]  Lipid Management Program - PCSK9i     Sánchez Chandler is a 57 y.o. male referred to the [facility] by Caridad Cassidy for clinical pharmacy and specialty pharmacy management of PCSK9i.  Sánchez Chandler is  treated for hyperlipidemia and currently does not take anything other than Repatha for his cholesterol.  In the past, Pt has tried Lipitor and Zetia and had allergic reaction of hives/rash with Lipitor. The patient denies any allergies to latex.  Patient reports continued tolerance of medication and denies any adverse effects. Patient has reports to clinic for injection and therefore has not missed any doses.     Relevant Past Medical History and Comorbidities  Past Medical History:   Diagnosis Date   • HTN (hypertension)      Social History     Socioeconomic History   • Marital status:    Tobacco Use   • Smoking status: Never   • Smokeless tobacco: Never   Substance and Sexual Activity   • Alcohol use: No   • Drug use: No   • Sexual activity: Defer       Allergies  Atorvastatin and Penicillins    Current Medication List    Current Outpatient Medications:   •  albuterol sulfate  (90 Base) MCG/ACT inhaler, Inhale 2 puffs Every 4 (Four) Hours As Needed for Wheezing., Disp: 8 g, Rfl: 1  •  amLODIPine (NORVASC) 5 MG tablet, Take 1 tablet by mouth Daily., Disp: 30 tablet, Rfl: 11  •  Evolocumab with Infusor (Repatha Pushtronex System) solution cartridge, Inject 420 mg under the skin into the appropriate area as directed Every 28 (Twenty-Eight) Days., Disp: 3.5 mL, Rfl: 5  •  Flaxseed, Linseed, (FLAXSEED OIL) 1000 MG capsule, Take 1 capsule by mouth Daily., Disp: , Rfl:   •  metoprolol succinate XL (TOPROL-XL) 25 MG 24 hr tablet, Take 1 tablet by mouth Daily., Disp: 30 tablet, Rfl: 5    Drug Interactions  No known drug drug interactions with Repatha expected according to literature     Relevant Laboratory Values  Lab Results   Component Value Date     CHOL 128 08/23/2022    TRIG 437 (H) 08/23/2022    HDL 24 (L) 08/23/2022    LDL 40 08/23/2022       Medication Assessment & Plan  The patient would like to follow-up in clinic for next injection. Care Coordinator to set up future refill outreaches, coordinate prescription delivery, and escalate clinical questions to pharmacist.     Most recent LDL is 40 while taking Repatha.     Placed Repatha 420mg on RIGHT arm. Pt denies any issues or adverse effects and reports he is still tolerating injection well. Since patient has been tolerating the medication well with no issues, will place a new order for Repatha Pushtronex with refills. Will place lipid panel order, recommended patient to complete lipid panel after cardiology appointment on 4/5/23.     As always patient was asked to remain on campus for 15 minutes after infusion. Patient reported feeling well when leaving clinic.     Will follow-up in 1 month for next injection.    Thank you,   Lavinia Houston [facility]  3/27/2023  08:36 EDT

## 2023-03-28 ENCOUNTER — LAB (OUTPATIENT)
Dept: LAB | Facility: HOSPITAL | Age: 58
End: 2023-03-28
Payer: COMMERCIAL

## 2023-03-28 DIAGNOSIS — E78.5 DYSLIPIDEMIA: ICD-10-CM

## 2023-03-28 LAB
CHOLEST SERPL-MCNC: 129 MG/DL (ref 0–200)
HDLC SERPL-MCNC: 32 MG/DL (ref 40–60)
LDLC SERPL CALC-MCNC: 74 MG/DL (ref 0–100)
LDLC/HDLC SERPL: 2.24 {RATIO}
TRIGL SERPL-MCNC: 126 MG/DL (ref 0–150)
VLDLC SERPL-MCNC: 23 MG/DL (ref 5–40)

## 2023-03-28 PROCEDURE — 80061 LIPID PANEL: CPT

## 2023-03-28 PROCEDURE — 36415 COLL VENOUS BLD VENIPUNCTURE: CPT

## 2023-04-05 ENCOUNTER — OFFICE VISIT (OUTPATIENT)
Dept: CARDIOLOGY | Facility: CLINIC | Age: 58
End: 2023-04-05
Payer: COMMERCIAL

## 2023-04-05 VITALS
HEIGHT: 72 IN | BODY MASS INDEX: 34.02 KG/M2 | WEIGHT: 251.2 LBS | DIASTOLIC BLOOD PRESSURE: 82 MMHG | RESPIRATION RATE: 16 BRPM | SYSTOLIC BLOOD PRESSURE: 153 MMHG | HEART RATE: 96 BPM | OXYGEN SATURATION: 95 %

## 2023-04-05 DIAGNOSIS — I10 ESSENTIAL HYPERTENSION: Primary | ICD-10-CM

## 2023-04-05 DIAGNOSIS — E78.5 DYSLIPIDEMIA: ICD-10-CM

## 2023-04-05 PROCEDURE — 1159F MED LIST DOCD IN RCRD: CPT | Performed by: NURSE PRACTITIONER

## 2023-04-05 PROCEDURE — 93000 ELECTROCARDIOGRAM COMPLETE: CPT | Performed by: NURSE PRACTITIONER

## 2023-04-05 PROCEDURE — 3077F SYST BP >= 140 MM HG: CPT | Performed by: NURSE PRACTITIONER

## 2023-04-05 PROCEDURE — 1160F RVW MEDS BY RX/DR IN RCRD: CPT | Performed by: NURSE PRACTITIONER

## 2023-04-05 PROCEDURE — 3079F DIAST BP 80-89 MM HG: CPT | Performed by: NURSE PRACTITIONER

## 2023-04-05 PROCEDURE — 99214 OFFICE O/P EST MOD 30 MIN: CPT | Performed by: NURSE PRACTITIONER

## 2023-04-05 RX ORDER — AMLODIPINE BESYLATE 5 MG/1
5 TABLET ORAL DAILY
Qty: 30 TABLET | Refills: 11 | Status: SHIPPED | OUTPATIENT
Start: 2023-04-05

## 2023-04-05 NOTE — PROGRESS NOTES
"Oliverio Escobar MD  Sánchez Chandler  1965 04/05/2023    Patient Active Problem List   Diagnosis   • Essential hypertension   • History of chest pain   • Dyslipidemia       Dear Oliverio Escobar MD:    Subjective     Chief Complaint   Patient presents with   • Follow-up     3 mos med therapy   • Med Management     presented           History of Present Illness:    Sánchez Chandler is a 57 y.o. male with a past medical history of hypertension and dyslipidemia. He presents today for routine cardiology follow up.  He reports he has been doing very well.  Denies any chest pain, shortness breath, palpitations, dizziness, or lightheadedness.  Previously, amlodipine was prescribed for his elevated blood pressure but he states he did not start taking this medication.  He states his pharmacy did not fill this for him.  His blood pressure remains elevated although slightly improved today.          Allergies   Allergen Reactions   • Atorvastatin Hives and Rash     Pt states atorvastatin make \"itchy, red bumps appear all over his body\"   • Penicillins    :      Current Outpatient Medications:   •  acetaminophen (TYLENOL) 500 MG tablet, Take 1 tablet by mouth 2 (Two) Times a Day As Needed for Mild Pain., Disp: , Rfl:   •  albuterol sulfate  (90 Base) MCG/ACT inhaler, Inhale 2 puffs Every 4 (Four) Hours As Needed for Wheezing., Disp: 8 g, Rfl: 1  •  amLODIPine (NORVASC) 5 MG tablet, Take 1 tablet by mouth Daily., Disp: 30 tablet, Rfl: 11  •  Evolocumab with Infusor (Repatha Pushtronex System) solution cartridge, Inject 420 mg under the skin into the appropriate area as directed Every 28 (Twenty-Eight) Days., Disp: 3.5 mL, Rfl: 5  •  Flaxseed, Linseed, (FLAXSEED OIL) 1000 MG capsule, Take 1 capsule by mouth Daily., Disp: , Rfl:   •  metoprolol succinate XL (TOPROL-XL) 25 MG 24 hr tablet, Take 1 tablet by mouth Daily., Disp: 30 tablet, Rfl: 5      The following portions of the patient's history were " "reviewed and updated as appropriate: allergies, current medications, past family history, past medical history, past social history, past surgical history and problem list.    Social History     Tobacco Use   • Smoking status: Never   • Smokeless tobacco: Never   Substance Use Topics   • Alcohol use: No   • Drug use: No       Review of Systems   Constitutional: Negative for decreased appetite and malaise/fatigue.   Cardiovascular: Negative for chest pain, dyspnea on exertion and palpitations.   Respiratory: Negative for cough and shortness of breath.        Objective   Vitals:    04/05/23 0851   BP: 153/82   Pulse: 96   Resp: 16   SpO2: 95%   Weight: 114 kg (251 lb 3.2 oz)   Height: 182.9 cm (72\")     Body mass index is 34.07 kg/m².        Vitals reviewed.   Constitutional:       Appearance: Healthy appearance. Well-developed and not in distress.   HENT:      Head: Normocephalic and atraumatic.   Pulmonary:      Effort: Pulmonary effort is normal.      Breath sounds: Normal breath sounds. No wheezing. No rales.   Cardiovascular:      Normal rate. Regular rhythm.      Murmurs: There is no murmur.      . No S3 and S4 gallop.   Edema:     Peripheral edema absent.   Abdominal:      General: Bowel sounds are normal.      Palpations: Abdomen is soft.   Skin:     General: Skin is warm and dry.   Neurological:      Mental Status: Alert, oriented to person, place, and time and oriented to person, place and time.   Psychiatric:         Mood and Affect: Mood normal.         Behavior: Behavior normal.         Lab Results   Component Value Date     08/23/2022    K 4.4 08/23/2022     08/23/2022    CO2 20.0 (L) 08/23/2022    BUN 18 08/23/2022    CREATININE 1.01 08/23/2022    GLUCOSE 102 (H) 08/23/2022    CALCIUM 9.4 08/23/2022    AST 19 08/23/2022    ALT 21 08/23/2022    ALKPHOS 77 08/23/2022       Lab Results   Component Value Date    TRIG 126 03/28/2023    HDL 32 (L) 03/28/2023    LDL 74 03/28/2023      No results " found for: BNP          ECG 12 Lead    Date/Time: 4/5/2023 8:50 AM  Performed by: Caridad Cassidy APRN  Authorized by: Caridad Cassidy APRN   Comparison: compared with previous ECG   Similar to previous ECG  Rhythm: sinus rhythm  BPM: 87  Conduction: incomplete right bundle branch block  Q waves: III                  Assessment & Plan    Diagnosis Plan   1. Essential hypertension  ECG 12 Lead    amLODIPine (NORVASC) 5 MG tablet      2. Dyslipidemia  ECG 12 Lead                   Recommendations:    1. Essential hypertension- we will initiate amlodipine 5 mg daily.  I did ask him to let us know if this is not filled at his pharmacy so we can get him started on this.  He will continue with current dose of metoprolol.  He cannot tolerate higher doses of metoprolol due to side effects.  2. Dyslipidemia-LDL at goal.  Continue Repatha.  3. Follow-up in 3 months or sooner if needed.        Return in about 3 months (around 7/5/2023) for Recheck.    As always, I appreciate very much the opportunity to participate in the cardiovascular care of your patients.      With Best Regards,    JULIÁN Paz

## 2023-04-05 NOTE — LETTER
"April 5, 2023     Oliverio Escobar MD  [address] [place] [id number]    Patient: Sánchez Chandler   YOB: 1965   Date of Visit: 4/5/2023       Dear Dr. Luz MD:    Thank you for referring Sánchez Chandler to me for evaluation. Below are the relevant portions of my assessment and plan of care.    If you have questions, please do not hesitate to call me. I look forward to following Sánchez along with you.         Sincerely,        JULIÁN Paz        CC: No Recipients    Caridad Cassidy APRN  04/05/23 0930  Signed  Oliverio Escobar MD  Sánchez Chandler  1965 04/05/2023    Patient Active Problem List   Diagnosis   • Essential hypertension   • History of chest pain   • Dyslipidemia       Dear Oliverio Escobar MD:    Subjective      Chief Complaint   Patient presents with   • Follow-up     3 mos med therapy   • Med Management     presented           History of Present Illness:    Sánchez Chandler is a 57 y.o. male with a past medical history of hypertension and dyslipidemia. He presents today for routine cardiology follow up.  He reports he has been doing very well.  Denies any chest pain, shortness breath, palpitations, dizziness, or lightheadedness.  Previously, amlodipine was prescribed for his elevated blood pressure but he states he did not start taking this medication.  He states his pharmacy did not fill this for him.  His blood pressure remains elevated although slightly improved today.          Allergies   Allergen Reactions   • Atorvastatin Hives and Rash     Pt states atorvastatin make \"itchy, red bumps appear all over his body\"   • Penicillins    :      Current Outpatient Medications:   •  acetaminophen (TYLENOL) 500 MG tablet, Take 1 tablet by mouth 2 (Two) Times a Day As Needed for Mild Pain., Disp: , Rfl:   •  albuterol sulfate  (90 Base) MCG/ACT inhaler, Inhale 2 puffs Every 4 (Four) Hours As Needed for Wheezing., Disp: 8 g, Rfl: 1  •  " "amLODIPine (NORVASC) 5 MG tablet, Take 1 tablet by mouth Daily., Disp: 30 tablet, Rfl: 11  •  Evolocumab with Infusor (Repatha Pushtronex System) solution cartridge, Inject 420 mg under the skin into the appropriate area as directed Every 28 (Twenty-Eight) Days., Disp: 3.5 mL, Rfl: 5  •  Flaxseed, Linseed, (FLAXSEED OIL) 1000 MG capsule, Take 1 capsule by mouth Daily., Disp: , Rfl:   •  metoprolol succinate XL (TOPROL-XL) 25 MG 24 hr tablet, Take 1 tablet by mouth Daily., Disp: 30 tablet, Rfl: 5      The following portions of the patient's history were reviewed and updated as appropriate: allergies, current medications, past family history, past medical history, past social history, past surgical history and problem list.    Social History     Tobacco Use   • Smoking status: Never   • Smokeless tobacco: Never   Substance Use Topics   • Alcohol use: No   • Drug use: No       Review of Systems   Constitutional: Negative for decreased appetite and malaise/fatigue.   Cardiovascular: Negative for chest pain, dyspnea on exertion and palpitations.   Respiratory: Negative for cough and shortness of breath.        Objective    Vitals:    04/05/23 0851   BP: 153/82   Pulse: 96   Resp: 16   SpO2: 95%   Weight: 114 kg (251 lb 3.2 oz)   Height: 182.9 cm (72\")     Body mass index is 34.07 kg/m².        Vitals reviewed.   Constitutional:       Appearance: Healthy appearance. Well-developed and not in distress.   HENT:      Head: Normocephalic and atraumatic.   Pulmonary:      Effort: Pulmonary effort is normal.      Breath sounds: Normal breath sounds. No wheezing. No rales.   Cardiovascular:      Normal rate. Regular rhythm.      Murmurs: There is no murmur.      . No S3 and S4 gallop.   Edema:     Peripheral edema absent.   Abdominal:      General: Bowel sounds are normal.      Palpations: Abdomen is soft.   Skin:     General: Skin is warm and dry.   Neurological:      Mental Status: Alert, oriented to person, place, and time " and oriented to person, place and time.   Psychiatric:         Mood and Affect: Mood normal.         Behavior: Behavior normal.         Lab Results   Component Value Date     08/23/2022    K 4.4 08/23/2022     08/23/2022    CO2 20.0 (L) 08/23/2022    BUN 18 08/23/2022    CREATININE 1.01 08/23/2022    GLUCOSE 102 (H) 08/23/2022    CALCIUM 9.4 08/23/2022    AST 19 08/23/2022    ALT 21 08/23/2022    ALKPHOS 77 08/23/2022       Lab Results   Component Value Date    TRIG 126 03/28/2023    HDL 32 (L) 03/28/2023    LDL 74 03/28/2023      No results found for: BNP          ECG 12 Lead    Date/Time: 4/5/2023 8:50 AM  Performed by: Caridad Cassidy APRN  Authorized by: Caridad Cassidy APRN   Comparison: compared with previous ECG   Similar to previous ECG  Rhythm: sinus rhythm  BPM: 87  Conduction: incomplete right bundle branch block  Q waves: III                  Assessment & Plan    Diagnosis Plan   1. Essential hypertension  ECG 12 Lead    amLODIPine (NORVASC) 5 MG tablet      2. Dyslipidemia  ECG 12 Lead                  Recommendations:    1. Essential hypertension- we will initiate amlodipine 5 mg daily.  I did ask him to let us know if this is not filled at his pharmacy so we can get him started on this.  He will continue with current dose of metoprolol.  He cannot tolerate higher doses of metoprolol due to side effects.  2. Dyslipidemia-LDL at goal.  Continue Repatha.  3. Follow-up in 3 months or sooner if needed.        Return in about 3 months (around 7/5/2023) for Recheck.    As always, I appreciate very much the opportunity to participate in the cardiovascular care of your patients.      With Best Regards,    JULIÁN Paz

## 2023-04-17 ENCOUNTER — SPECIALTY PHARMACY (OUTPATIENT)
Dept: PHARMACY | Facility: HOSPITAL | Age: 58
End: 2023-04-17
Payer: COMMERCIAL

## 2023-04-17 NOTE — PROGRESS NOTES
"    Specialty Pharmacy Refill Coordination Note      Name:  Sánchez Chandler  :  1965  Date:  2023         Past Medical History:   Diagnosis Date   • HTN (hypertension)        Past Surgical History:   Procedure Laterality Date   • SKIN GRAFT         Social History     Socioeconomic History   • Marital status:    Tobacco Use   • Smoking status: Never   • Smokeless tobacco: Never   Substance and Sexual Activity   • Alcohol use: No   • Drug use: No   • Sexual activity: Defer       Family History   Problem Relation Age of Onset   • No Known Problems Mother    • No Known Problems Father    • No Known Problems Sister    • No Known Problems Maternal Grandmother    • No Known Problems Maternal Grandfather    • No Known Problems Paternal Grandmother    • No Known Problems Paternal Grandfather        Allergies   Allergen Reactions   • Atorvastatin Hives and Rash     Pt states atorvastatin make \"itchy, red bumps appear all over his body\"   • Penicillins        Current Outpatient Medications   Medication Sig Dispense Refill   • acetaminophen (TYLENOL) 500 MG tablet Take 1 tablet by mouth 2 (Two) Times a Day As Needed for Mild Pain.     • albuterol sulfate  (90 Base) MCG/ACT inhaler Inhale 2 puffs Every 4 (Four) Hours As Needed for Wheezing. 8 g 1   • amLODIPine (NORVASC) 5 MG tablet Take 1 tablet by mouth Daily. 30 tablet 11   • Evolocumab with Infusor (Repatha Pushtronex System) solution cartridge Inject 420 mg under the skin into the appropriate area as directed Every 28 (Twenty-Eight) Days. 3.5 mL 5   • Flaxseed, Linseed, (FLAXSEED OIL) 1000 MG capsule Take 1 capsule by mouth Daily.     • metoprolol succinate XL (TOPROL-XL) 25 MG 24 hr tablet Take 1 tablet by mouth Daily. 30 tablet 5     No current facility-administered medications for this visit.         ASSESSMENT/PLAN:      Sánchez \"SHELLEY\" is a 57 y.o. male contacted today regarding refills of  Repatha pushtronex injection  specialty " medication(s).    Reviewed and verified with patient:       Specialty medication(s) and dose(s) confirmed: yes    Refill Questions    Flowsheet Row Most Recent Value   Changes to allergies? No   Changes to medications? No   New conditions since last clinic visit No   Unplanned office visit, urgent care, ED, or hospital admission in the last 4 weeks  No   How does patient/caregiver feel medication is working? Very good   Financial problems or insurance changes  No   If yes, describe changes in insurance or financial issues. no   Since the previous refill, were any specialty medication doses or scheduled injections missed or delayed?  No   If yes, please provide the amount 0   Why were doses missed? n/a   Does this patient require a clinical escalation to a pharmacist? No                Medication Adherence    Adherence tools used: calendar   Other adherence tool: Pt receives injection in clinic monthly   Support network for adherence: healthcare provider          Follow-up: 28 day(s)     Leila Escalera, Pharmacy Technician  Specialty Pharmacy Technician

## 2023-04-24 ENCOUNTER — DISEASE STATE MANAGEMENT VISIT (OUTPATIENT)
Dept: PHARMACY | Facility: HOSPITAL | Age: 58
End: 2023-04-24
Payer: COMMERCIAL

## 2023-04-24 NOTE — PROGRESS NOTES
[facility]  Lipid Management Program - [facility]     Sánchez Chandler is a 57 y.o. male referred to the [facility] by Caridad Cassidy for clinical pharmacy and specialty pharmacy management of [facility].  Sánhcez Chandler is  treated for hyperlipidemia and currently does not take anything other than Repatha for his cholesterol.  In the past, Pt has tried Lipitor and Zetia and had allergic reaction of hives/rash with Lipitor. The patient denies any allergies to latex.  Patient reports continued tolerance of medication and denies any adverse effects. Patient has reports to clinic for injection and therefore has not missed any doses.     Relevant Past Medical History and Comorbidities  Past Medical History:   Diagnosis Date   • HTN (hypertension)      Social History     Socioeconomic History   • Marital status:    Tobacco Use   • Smoking status: Never   • Smokeless tobacco: Never   Substance and Sexual Activity   • Alcohol use: No   • Drug use: No   • Sexual activity: Defer       Allergies  Atorvastatin and Penicillins    Current Medication List    Current Outpatient Medications:   •  acetaminophen (TYLENOL) 500 MG tablet, Take 1 tablet by mouth 2 (Two) Times a Day As Needed for Mild Pain., Disp: , Rfl:   •  albuterol sulfate  (90 Base) MCG/ACT inhaler, Inhale 2 puffs Every 4 (Four) Hours As Needed for Wheezing., Disp: 8 g, Rfl: 1  •  amLODIPine (NORVASC) 5 MG tablet, Take 1 tablet by mouth Daily., Disp: 30 tablet, Rfl: 11  •  Evolocumab with Infusor (Repatha Pushtronex System) solution cartridge, Inject 420 mg under the skin into the appropriate area as directed Every 28 (Twenty-Eight) Days., Disp: 3.5 mL, Rfl: 5  •  Flaxseed, Linseed, (FLAXSEED OIL) 1000 MG capsule, Take 1 capsule by mouth Daily., Disp: , Rfl:   •  metoprolol succinate XL (TOPROL-XL) 25 MG 24 hr tablet, Take 1 tablet by mouth Daily., Disp: 30 tablet, Rfl: 5    Drug Interactions  No known drug drug  interactions with Repatha expected according to literature     Relevant Laboratory Values  Lab Results   Component Value Date    CHOL 129 03/28/2023    TRIG 126 03/28/2023    HDL 32 (L) 03/28/2023    LDL 74 03/28/2023       Medication Assessment & Plan  The patient would like to follow-up in clinic for next injection. Care Coordinator to set up future refill outreaches, coordinate prescription delivery, and escalate clinical questions to pharmacist.     Most recent LDL is 74 while taking Repatha. Per Caridad Cassidy's cardiology note, LDL is at goal and recommended to continue Repatha.    Placed Repatha 420mg on RIGHT arm. Pt denies any issues or adverse effects and reports he is still tolerating injection well.      As always patient was asked to remain on campus for 15 minutes after infusion. Patient reported feeling well when leaving clinic.     Will follow-up in 1 month for next injection.    Thank you,   Lavinia Houston [facility]  4/24/2023  08:30 EDT

## 2023-05-16 ENCOUNTER — SPECIALTY PHARMACY (OUTPATIENT)
Dept: PHARMACY | Facility: HOSPITAL | Age: 58
End: 2023-05-16
Payer: COMMERCIAL

## 2023-05-22 ENCOUNTER — DISEASE STATE MANAGEMENT VISIT (OUTPATIENT)
Dept: PHARMACY | Facility: HOSPITAL | Age: 58
End: 2023-05-22
Payer: COMMERCIAL

## 2023-05-22 NOTE — PROGRESS NOTES
[facility]  Lipid Management Program - [facility]     Sánchez Chandler is a 57 y.o. male referred to the [facility] by Caridad Cassidy for clinical pharmacy and specialty pharmacy management of [facility].  Sánchez Chandler is  treated for hyperlipidemia and currently does not take anything other than Repatha for his cholesterol.  In the past, Pt has tried Lipitor and Zetia and had allergic reaction of hives/rash with Lipitor. The patient denies any allergies to latex.  Patient reports continued tolerance of medication and denies any adverse effects. Patient has reports to clinic for injection and therefore has not missed any doses.     Relevant Past Medical History and Comorbidities  Past Medical History:   Diagnosis Date   • HTN (hypertension)      Social History     Socioeconomic History   • Marital status:    Tobacco Use   • Smoking status: Never   • Smokeless tobacco: Never   Substance and Sexual Activity   • Alcohol use: No   • Drug use: No   • Sexual activity: Defer       Allergies  Atorvastatin and Penicillins    Current Medication List    Current Outpatient Medications:   •  acetaminophen (TYLENOL) 500 MG tablet, Take 1 tablet by mouth 2 (Two) Times a Day As Needed for Mild Pain., Disp: , Rfl:   •  albuterol sulfate  (90 Base) MCG/ACT inhaler, Inhale 2 puffs Every 4 (Four) Hours As Needed for Wheezing., Disp: 8 g, Rfl: 1  •  amLODIPine (NORVASC) 5 MG tablet, Take 1 tablet by mouth Daily., Disp: 30 tablet, Rfl: 11  •  Evolocumab with Infusor (Repatha Pushtronex System) solution cartridge, Inject 420 mg under the skin into the appropriate area as directed Every 28 (Twenty-Eight) Days., Disp: 3.5 mL, Rfl: 5  •  Flaxseed, Linseed, (FLAXSEED OIL) 1000 MG capsule, Take 1 capsule by mouth Daily., Disp: , Rfl:   •  metoprolol succinate XL (TOPROL-XL) 25 MG 24 hr tablet, Take 1 tablet by mouth Daily., Disp: 30 tablet, Rfl: 5    Drug Interactions  No known drug drug  interactions with Repatha expected according to literature     Relevant Laboratory Values  Lab Results   Component Value Date    CHOL 129 03/28/2023    TRIG 126 03/28/2023    HDL 32 (L) 03/28/2023    LDL 74 03/28/2023       Medication Assessment & Plan  The patient would like to follow-up in clinic for next injection. Care Coordinator to set up future refill outreaches, coordinate prescription delivery, and escalate clinical questions to pharmacist.     Most recent LDL is 74 while taking Repatha on 3/28/23. Per Caridad Cassidy's cardiology note, LDL is at goal and recommended to continue Repatha.    Placed Repatha 420mg on LEFT arm. Pt denies any issues or adverse effects and reports he is still tolerating injection well.      As always patient was asked to remain on campus for 15 minutes after infusion. Patient reported feeling well when leaving clinic.     Will follow-up in 1 month for next injection.    Thank you,   Sheryl Hooks, PharmD  5/22/2023  08:23 EDT

## 2023-06-14 ENCOUNTER — SPECIALTY PHARMACY (OUTPATIENT)
Dept: PHARMACY | Facility: HOSPITAL | Age: 58
End: 2023-06-14
Payer: COMMERCIAL

## 2023-06-14 NOTE — PROGRESS NOTES
"    Specialty Pharmacy Refill Coordination Note      Name:  Sánchez Chandler  :  1965  Date:  2023         Past Medical History:   Diagnosis Date    HTN (hypertension)        Past Surgical History:   Procedure Laterality Date    SKIN GRAFT         Social History     Socioeconomic History    Marital status:    Tobacco Use    Smoking status: Never    Smokeless tobacco: Never   Substance and Sexual Activity    Alcohol use: No    Drug use: No    Sexual activity: Defer       Family History   Problem Relation Age of Onset    No Known Problems Mother     No Known Problems Father     No Known Problems Sister     No Known Problems Maternal Grandmother     No Known Problems Maternal Grandfather     No Known Problems Paternal Grandmother     No Known Problems Paternal Grandfather        Allergies   Allergen Reactions    Atorvastatin Hives and Rash     Pt states atorvastatin make \"itchy, red bumps appear all over his body\"    Penicillins        Current Outpatient Medications   Medication Sig Dispense Refill    acetaminophen (TYLENOL) 500 MG tablet Take 1 tablet by mouth 2 (Two) Times a Day As Needed for Mild Pain.      albuterol sulfate  (90 Base) MCG/ACT inhaler Inhale 2 puffs Every 4 (Four) Hours As Needed for Wheezing. 8 g 1    amLODIPine (NORVASC) 5 MG tablet Take 1 tablet by mouth Daily. 30 tablet 11    Evolocumab with Infusor (Repatha Pushtronex System) solution cartridge Inject 420 mg under the skin into the appropriate area as directed Every 28 (Twenty-Eight) Days. 3.5 mL 5    Flaxseed, Linseed, (FLAXSEED OIL) 1000 MG capsule Take 1 capsule by mouth Daily.      metoprolol succinate XL (TOPROL-XL) 25 MG 24 hr tablet Take 1 tablet by mouth Daily. 30 tablet 5     No current facility-administered medications for this visit.         ASSESSMENT/PLAN:      Sánchez \"SHELLEY\" is a 57 y.o. male contacted today regarding refills of  Repatha pushtronex injection  specialty medication(s).    Reviewed and " verified with patient:       Specialty medication(s) and dose(s) confirmed: yes    Refill Questions      Flowsheet Row Most Recent Value   Changes to allergies? No   Changes to medications? No   New conditions since last clinic visit No   Unplanned office visit, urgent care, ED, or hospital admission in the last 4 weeks  No   How does patient/caregiver feel medication is working? Very good   Financial problems or insurance changes  No   If yes, describe changes in insurance or financial issues. no   Since the previous refill, were any specialty medication doses or scheduled injections missed or delayed?  No   If yes, please provide the amount 0   Why were doses missed? n/a   Does this patient require a clinical escalation to a pharmacist? No                  Medication Adherence    Adherence tools used: calendar   Other adherence tool: Pt receives injection in clinic monthly   Support network for adherence: healthcare provider          Follow-up: 28 day(s)     Leila Escalera, Pharmacy Technician  Specialty Pharmacy Technician

## 2023-06-19 ENCOUNTER — DISEASE STATE MANAGEMENT VISIT (OUTPATIENT)
Dept: PHARMACY | Facility: HOSPITAL | Age: 58
End: 2023-06-19
Payer: COMMERCIAL

## 2023-06-19 NOTE — PROGRESS NOTES
[facility]  Lipid Management Program - [facility]     Sánchez Chandler is a 57 y.o. male referred to the [facility] by Caridad Cassidy for clinical pharmacy and specialty pharmacy management of [facility].  Sánchez Chandler is  treated for hyperlipidemia and currently does not take anything other than Repatha for his cholesterol.  In the past, Pt has tried Lipitor and Zetia and had allergic reaction of hives/rash with Lipitor. The patient denies any allergies to latex.  Patient reports continued tolerance of medication and denies any adverse effects. Patient has reports to clinic for injection and therefore has not missed any doses.     Relevant Past Medical History and Comorbidities  Past Medical History:   Diagnosis Date    HTN (hypertension)      Social History     Socioeconomic History    Marital status:    Tobacco Use    Smoking status: Never    Smokeless tobacco: Never   Substance and Sexual Activity    Alcohol use: No    Drug use: No    Sexual activity: Defer       Allergies  Atorvastatin and Penicillins    Current Medication List    Current Outpatient Medications:     acetaminophen (TYLENOL) 500 MG tablet, Take 1 tablet by mouth 2 (Two) Times a Day As Needed for Mild Pain., Disp: , Rfl:     albuterol sulfate  (90 Base) MCG/ACT inhaler, Inhale 2 puffs Every 4 (Four) Hours As Needed for Wheezing., Disp: 8 g, Rfl: 1    amLODIPine (NORVASC) 5 MG tablet, Take 1 tablet by mouth Daily., Disp: 30 tablet, Rfl: 11    Evolocumab with Infusor (Repatha Pushtronex System) solution cartridge, Inject 420 mg under the skin into the appropriate area as directed Every 28 (Twenty-Eight) Days., Disp: 3.5 mL, Rfl: 5    Flaxseed, Linseed, (FLAXSEED OIL) 1000 MG capsule, Take 1 capsule by mouth Daily., Disp: , Rfl:     metoprolol succinate XL (TOPROL-XL) 25 MG 24 hr tablet, Take 1 tablet by mouth Daily., Disp: 30 tablet, Rfl: 5    Drug Interactions  No known drug drug interactions  with Repatha expected according to literature     Relevant Laboratory Values  Lab Results   Component Value Date    CHOL 129 03/28/2023    TRIG 126 03/28/2023    HDL 32 (L) 03/28/2023    LDL 74 03/28/2023       Medication Assessment & Plan  The patient would like to follow-up in clinic for next injection. Care Coordinator to set up future refill outreaches, coordinate prescription delivery, and escalate clinical questions to pharmacist.     Most recent LDL is 74 while taking Repatha on 3/28/23. Per Caridad Cassidy's cardiology note, LDL is at goal and recommended to continue Repatha.    Placed Repatha 420mg on RIGHT arm. Pt denies any issues or adverse effects and reports he is still tolerating injection well.     As always patient was asked to remain on campus for 15 minutes after infusion. Patient reported feeling well when leaving clinic.     Will follow-up in 1 month for next injection.    Thank you,   Lavinia Houston [facility]  6/19/2023  10:25 EDT

## 2023-07-23 ENCOUNTER — HOSPITAL ENCOUNTER (EMERGENCY)
Facility: HOSPITAL | Age: 58
Discharge: HOME | End: 2023-07-23
Attending: EMERGENCY MEDICINE | Admitting: EMERGENCY MEDICINE
Payer: COMMERCIAL

## 2023-07-23 ENCOUNTER — APPOINTMENT (OUTPATIENT)
Dept: CT IMAGING | Facility: HOSPITAL | Age: 58
End: 2023-07-23
Payer: COMMERCIAL

## 2023-07-23 VITALS
HEART RATE: 88 BPM | RESPIRATION RATE: 17 BRPM | BODY MASS INDEX: 33.86 KG/M2 | OXYGEN SATURATION: 97 % | SYSTOLIC BLOOD PRESSURE: 145 MMHG | DIASTOLIC BLOOD PRESSURE: 78 MMHG | WEIGHT: 250 LBS | TEMPERATURE: 98 F | HEIGHT: 72 IN

## 2023-07-23 DIAGNOSIS — S39.012A LUMBAR STRAIN, INITIAL ENCOUNTER: Primary | ICD-10-CM

## 2023-07-23 LAB
ALBUMIN SERPL-MCNC: 4 G/DL (ref 3.5–5.2)
ALBUMIN/GLOB SERPL: 1.4 G/DL
ALP SERPL-CCNC: 77 U/L (ref 39–117)
ALT SERPL W P-5'-P-CCNC: 34 U/L (ref 1–41)
ANION GAP SERPL CALCULATED.3IONS-SCNC: 10.5 MMOL/L (ref 5–15)
AST SERPL-CCNC: 26 U/L (ref 1–40)
BASOPHILS # BLD AUTO: 0.02 10*3/MM3 (ref 0–0.2)
BASOPHILS NFR BLD AUTO: 0.7 % (ref 0–1.5)
BILIRUB SERPL-MCNC: 0.2 MG/DL (ref 0–1.2)
BILIRUB UR QL STRIP: NEGATIVE
BUN SERPL-MCNC: 15 MG/DL (ref 6–20)
BUN/CREAT SERPL: 13.9 (ref 7–25)
CALCIUM SPEC-SCNC: 8.9 MG/DL (ref 8.6–10.5)
CHLORIDE SERPL-SCNC: 110 MMOL/L (ref 98–107)
CLARITY UR: CLEAR
CO2 SERPL-SCNC: 19.5 MMOL/L (ref 22–29)
COLOR UR: YELLOW
CREAT SERPL-MCNC: 1.08 MG/DL (ref 0.76–1.27)
DEPRECATED RDW RBC AUTO: 43.3 FL (ref 37–54)
EGFRCR SERPLBLD CKD-EPI 2021: 80.5 ML/MIN/1.73
EOSINOPHIL # BLD AUTO: 0.11 10*3/MM3 (ref 0–0.4)
EOSINOPHIL NFR BLD AUTO: 3.8 % (ref 0.3–6.2)
ERYTHROCYTE [DISTWIDTH] IN BLOOD BY AUTOMATED COUNT: 13.8 % (ref 12.3–15.4)
GLOBULIN UR ELPH-MCNC: 2.8 GM/DL
GLUCOSE SERPL-MCNC: 111 MG/DL (ref 65–99)
GLUCOSE UR STRIP-MCNC: NEGATIVE MG/DL
HCT VFR BLD AUTO: 46.6 % (ref 37.5–51)
HGB BLD-MCNC: 15 G/DL (ref 13–17.7)
HGB UR QL STRIP.AUTO: NEGATIVE
IMM GRANULOCYTES # BLD AUTO: 0.01 10*3/MM3 (ref 0–0.05)
IMM GRANULOCYTES NFR BLD AUTO: 0.3 % (ref 0–0.5)
KETONES UR QL STRIP: NEGATIVE
LEUKOCYTE ESTERASE UR QL STRIP.AUTO: NEGATIVE
LYMPHOCYTES # BLD AUTO: 0.66 10*3/MM3 (ref 0.7–3.1)
LYMPHOCYTES NFR BLD AUTO: 22.9 % (ref 19.6–45.3)
MCH RBC QN AUTO: 27.6 PG (ref 26.6–33)
MCHC RBC AUTO-ENTMCNC: 32.2 G/DL (ref 31.5–35.7)
MCV RBC AUTO: 85.8 FL (ref 79–97)
MONOCYTES # BLD AUTO: 0.44 10*3/MM3 (ref 0.1–0.9)
MONOCYTES NFR BLD AUTO: 15.3 % (ref 5–12)
NEUTROPHILS NFR BLD AUTO: 1.64 10*3/MM3 (ref 1.7–7)
NEUTROPHILS NFR BLD AUTO: 57 % (ref 42.7–76)
NITRITE UR QL STRIP: NEGATIVE
NRBC BLD AUTO-RTO: 0 /100 WBC (ref 0–0.2)
PH UR STRIP.AUTO: 6 [PH] (ref 5–8)
PLATELET # BLD AUTO: 123 10*3/MM3 (ref 140–450)
PMV BLD AUTO: 9.3 FL (ref 6–12)
POTASSIUM SERPL-SCNC: 4.3 MMOL/L (ref 3.5–5.2)
PROT SERPL-MCNC: 6.8 G/DL (ref 6–8.5)
PROT UR QL STRIP: NEGATIVE
RBC # BLD AUTO: 5.43 10*6/MM3 (ref 4.14–5.8)
SODIUM SERPL-SCNC: 140 MMOL/L (ref 136–145)
SP GR UR STRIP: 1.02 (ref 1–1.03)
UROBILINOGEN UR QL STRIP: NORMAL
WBC NRBC COR # BLD: 2.88 10*3/MM3 (ref 3.4–10.8)

## 2023-07-23 PROCEDURE — 72131 CT LUMBAR SPINE W/O DYE: CPT | Performed by: RADIOLOGY

## 2023-07-23 PROCEDURE — 85025 COMPLETE CBC W/AUTO DIFF WBC: CPT | Performed by: PHYSICIAN ASSISTANT

## 2023-07-23 PROCEDURE — 96372 THER/PROPH/DIAG INJ SC/IM: CPT

## 2023-07-23 PROCEDURE — 72131 CT LUMBAR SPINE W/O DYE: CPT

## 2023-07-23 PROCEDURE — 80053 COMPREHEN METABOLIC PANEL: CPT | Performed by: PHYSICIAN ASSISTANT

## 2023-07-23 PROCEDURE — 25010000002 KETOROLAC TROMETHAMINE PER 15 MG: Performed by: PHYSICIAN ASSISTANT

## 2023-07-23 PROCEDURE — 36415 COLL VENOUS BLD VENIPUNCTURE: CPT

## 2023-07-23 PROCEDURE — 99283 EMERGENCY DEPT VISIT LOW MDM: CPT

## 2023-07-23 PROCEDURE — 81003 URINALYSIS AUTO W/O SCOPE: CPT | Performed by: PHYSICIAN ASSISTANT

## 2023-07-23 RX ORDER — KETOROLAC TROMETHAMINE 30 MG/ML
60 INJECTION, SOLUTION INTRAMUSCULAR; INTRAVENOUS ONCE
Status: COMPLETED | OUTPATIENT
Start: 2023-07-23 | End: 2023-07-23

## 2023-07-23 RX ORDER — NAPROXEN 500 MG/1
500 TABLET ORAL 2 TIMES DAILY PRN
Qty: 12 TABLET | Refills: 0 | Status: SHIPPED | OUTPATIENT
Start: 2023-07-23

## 2023-07-23 RX ORDER — CYCLOBENZAPRINE HCL 10 MG
10 TABLET ORAL 3 TIMES DAILY PRN
Qty: 15 TABLET | Refills: 0 | Status: SHIPPED | OUTPATIENT
Start: 2023-07-23

## 2023-07-23 RX ADMIN — KETOROLAC TROMETHAMINE 60 MG: 30 INJECTION, SOLUTION INTRAMUSCULAR at 12:35

## 2023-07-23 NOTE — ED PROVIDER NOTES
"Subjective   History of Present Illness  Flank pain/back pain for 3-4 days no improvement with otc meds   No known injury  Pt denies any dysuria/hematuria     History provided by:  Patient   used: No    Flank Pain  Pain location:  R flank  Pain quality: aching    Pain radiates to:  R flank and L flank  Pain severity:  Moderate  Onset quality:  Sudden  Duration:  4 days  Timing:  Constant  Chronicity:  New  Relieved by:  Nothing  Worsened by:  Nothing  Ineffective treatments:  None tried  Associated symptoms: no constipation, no cough and no shortness of breath      Review of Systems   Respiratory:  Negative for cough and shortness of breath.    Gastrointestinal:  Negative for constipation.   Genitourinary:  Positive for flank pain.     Past Medical History:   Diagnosis Date    HTN (hypertension)        Allergies   Allergen Reactions    Atorvastatin Hives and Rash     Pt states atorvastatin make \"itchy, red bumps appear all over his body\"    Penicillins        Past Surgical History:   Procedure Laterality Date    SKIN GRAFT         Family History   Problem Relation Age of Onset    No Known Problems Mother     No Known Problems Father     No Known Problems Sister     No Known Problems Maternal Grandmother     No Known Problems Maternal Grandfather     No Known Problems Paternal Grandmother     No Known Problems Paternal Grandfather        Social History     Socioeconomic History    Marital status:    Tobacco Use    Smoking status: Never    Smokeless tobacco: Never   Vaping Use    Vaping Use: Never used   Substance and Sexual Activity    Alcohol use: No    Drug use: No    Sexual activity: Defer           Objective   Physical Exam  Vitals and nursing note reviewed.   Constitutional:       Appearance: He is well-developed.   HENT:      Head: Normocephalic.   Cardiovascular:      Rate and Rhythm: Normal rate and regular rhythm.   Pulmonary:      Effort: Pulmonary effort is normal.      Breath " sounds: Normal breath sounds.   Abdominal:      General: Bowel sounds are normal.      Palpations: Abdomen is soft.      Tenderness: There is no abdominal tenderness.   Musculoskeletal:         General: Normal range of motion.      Cervical back: Neck supple.   Skin:     General: Skin is warm and dry.   Neurological:      Mental Status: He is alert and oriented to person, place, and time.   Psychiatric:         Behavior: Behavior normal.         Thought Content: Thought content normal.         Judgment: Judgment normal.       Procedures           ED Course      Results for orders placed or performed during the hospital encounter of 07/23/23   Comprehensive Metabolic Panel    Specimen: Arm, Left; Blood   Result Value Ref Range    Glucose 111 (H) 65 - 99 mg/dL    BUN 15 6 - 20 mg/dL    Creatinine 1.08 0.76 - 1.27 mg/dL    Sodium 140 136 - 145 mmol/L    Potassium 4.3 3.5 - 5.2 mmol/L    Chloride 110 (H) 98 - 107 mmol/L    CO2 19.5 (L) 22.0 - 29.0 mmol/L    Calcium 8.9 8.6 - 10.5 mg/dL    Total Protein 6.8 6.0 - 8.5 g/dL    Albumin 4.0 3.5 - 5.2 g/dL    ALT (SGPT) 34 1 - 41 U/L    AST (SGOT) 26 1 - 40 U/L    Alkaline Phosphatase 77 39 - 117 U/L    Total Bilirubin 0.2 0.0 - 1.2 mg/dL    Globulin 2.8 gm/dL    A/G Ratio 1.4 g/dL    BUN/Creatinine Ratio 13.9 7.0 - 25.0    Anion Gap 10.5 5.0 - 15.0 mmol/L    eGFR 80.5 >60.0 mL/min/1.73   Urinalysis With Culture If Indicated - Urine, Clean Catch    Specimen: Urine, Clean Catch   Result Value Ref Range    Color, UA Yellow Yellow, Straw    Appearance, UA Clear Clear    pH, UA 6.0 5.0 - 8.0    Specific Gravity, UA 1.018 1.005 - 1.030    Glucose, UA Negative Negative    Ketones, UA Negative Negative    Bilirubin, UA Negative Negative    Blood, UA Negative Negative    Protein, UA Negative Negative    Leuk Esterase, UA Negative Negative    Nitrite, UA Negative Negative    Urobilinogen, UA 0.2 E.U./dL 0.2 - 1.0 E.U./dL   CBC Auto Differential    Specimen: Arm, Left; Blood   Result  Value Ref Range    WBC 2.88 (L) 3.40 - 10.80 10*3/mm3    RBC 5.43 4.14 - 5.80 10*6/mm3    Hemoglobin 15.0 13.0 - 17.7 g/dL    Hematocrit 46.6 37.5 - 51.0 %    MCV 85.8 79.0 - 97.0 fL    MCH 27.6 26.6 - 33.0 pg    MCHC 32.2 31.5 - 35.7 g/dL    RDW 13.8 12.3 - 15.4 %    RDW-SD 43.3 37.0 - 54.0 fl    MPV 9.3 6.0 - 12.0 fL    Platelets 123 (L) 140 - 450 10*3/mm3    Neutrophil % 57.0 42.7 - 76.0 %    Lymphocyte % 22.9 19.6 - 45.3 %    Monocyte % 15.3 (H) 5.0 - 12.0 %    Eosinophil % 3.8 0.3 - 6.2 %    Basophil % 0.7 0.0 - 1.5 %    Immature Grans % 0.3 0.0 - 0.5 %    Neutrophils, Absolute 1.64 (L) 1.70 - 7.00 10*3/mm3    Lymphocytes, Absolute 0.66 (L) 0.70 - 3.10 10*3/mm3    Monocytes, Absolute 0.44 0.10 - 0.90 10*3/mm3    Eosinophils, Absolute 0.11 0.00 - 0.40 10*3/mm3    Basophils, Absolute 0.02 0.00 - 0.20 10*3/mm3    Immature Grans, Absolute 0.01 0.00 - 0.05 10*3/mm3    nRBC 0.0 0.0 - 0.2 /100 WBC              CT Lumbar Spine Without Contrast   Final Result       No acute fracture or malalignment.       This report was finalized on 7/23/2023 12:05 PM by Oliverio Wan MD.                                        Medical Decision Making  Flank pain/back pain for 3-4 days no improvement with otc meds   No known injury  Pt denies any dysuria/hematuria       Problems Addressed:  Lumbar strain, initial encounter: complicated acute illness or injury    Amount and/or Complexity of Data Reviewed  Labs: ordered.  Radiology: ordered.    Risk  Prescription drug management.  Risk Details: Tiffanie Garvin    Patient is stable.  Patient is medically cleared.  No EMC exist.  Patient is discharged home.  Patient's diagnostic results were discussed with him and they demonstrated understanding of diagnosis and treatment plan.  Patient was advised to return to the ER or follow-up with PCP if symptoms worsen or fail to improve as expected.         Final diagnoses:   Lumbar strain, initial encounter       ED Disposition  ED Disposition        ED Disposition   Discharge    Condition   Stable    Comment   --               Oliverio Escobar MD  [address] [place] [address]  [place] [address]  [phone]    In 2 days           Medication List        New Prescriptions      cyclobenzaprine 10 MG tablet  Commonly known as: FLEXERIL  Take 1 tablet by mouth 3 (Three) Times a Day As Needed for Muscle Spasms for up to 15 doses.     naproxen 500 MG EC tablet  Commonly known as: EC NAPROSYN  Take 1 tablet by mouth 2 (Two) Times a Day As Needed for Mild Pain.               Where to Get Your Medications        These medications were sent to [facility] [address] [place] [address] - [phone]  - [phone]   [address] [address]      Phone: [phone]   cyclobenzaprine 10 MG tablet  naproxen 500 MG EC tablet            Tiffanie Garvin PA  07/24/23 1034

## 2023-08-14 ENCOUNTER — DISEASE STATE MANAGEMENT VISIT (OUTPATIENT)
Dept: PHARMACY | Facility: HOSPITAL | Age: 58
End: 2023-08-14
Payer: COMMERCIAL

## 2023-08-14 NOTE — PROGRESS NOTES
[facility]  Lipid Management Program - [facility]     Sánchez Chandler is a 57 y.o. male referred to the [facility] by Caridad Cassidy for clinical pharmacy and specialty pharmacy management of [facility].  Sánchez Chandler is  treated for hyperlipidemia and currently does not take anything other than Repatha for his cholesterol.  In the past, Pt has tried Lipitor and Zetia and had allergic reaction of hives/rash with Lipitor. The patient denies any allergies to latex.  Patient reports continued tolerance of medication and denies any adverse effects. Patient has reports to clinic for injection and therefore has not missed any doses.       Relevant Past Medical History and Comorbidities  Past Medical History:   Diagnosis Date    HTN (hypertension)      Social History     Socioeconomic History    Marital status:    Tobacco Use    Smoking status: Never    Smokeless tobacco: Never   Vaping Use    Vaping Use: Never used   Substance and Sexual Activity    Alcohol use: No    Drug use: No    Sexual activity: Defer       Allergies  Atorvastatin and Penicillins    Current Medication List    Current Outpatient Medications:     acetaminophen (TYLENOL) 500 MG tablet, Take 1 tablet by mouth 2 (Two) Times a Day As Needed for Mild Pain., Disp: , Rfl:     albuterol sulfate  (90 Base) MCG/ACT inhaler, Inhale 2 puffs Every 4 (Four) Hours As Needed for Wheezing., Disp: 8 g, Rfl: 1    amLODIPine (NORVASC) 5 MG tablet, Take 1 tablet by mouth Daily., Disp: 30 tablet, Rfl: 11    cyclobenzaprine (FLEXERIL) 10 MG tablet, Take 1 tablet by mouth 3 (Three) Times a Day As Needed for Muscle Spasms for up to 15 doses., Disp: 15 tablet, Rfl: 0    Evolocumab with Infusor (Repatha Pushtronex System) solution cartridge, Inject 420 mg under the skin into the appropriate area as directed Every 28 (Twenty-Eight) Days., Disp: 3.5 mL, Rfl: 5    Flaxseed, Linseed, (FLAXSEED OIL) 1000 MG capsule, Take 1 capsule  by mouth Daily., Disp: , Rfl:     metoprolol succinate XL (TOPROL-XL) 25 MG 24 hr tablet, Take 1 tablet by mouth Daily., Disp: 30 tablet, Rfl: 11    naproxen (EC NAPROSYN) 500 MG EC tablet, Take 1 tablet by mouth 2 (Two) Times a Day As Needed for Mild Pain., Disp: 12 tablet, Rfl: 0    Drug Interactions  No known drug drug interactions with Repatha expected according to literature     Relevant Laboratory Values  Lab Results   Component Value Date    CHOL 129 03/28/2023    TRIG 126 03/28/2023    HDL 32 (L) 03/28/2023    LDL 74 03/28/2023       Medication Assessment & Plan    The patient would like to follow-up in clinic for next injection. Care Coordinator to set up future refill outreaches, coordinate prescription delivery, and escalate clinical questions to pharmacist.     Most recent LDL is 74 while taking Repatha on 3/28/23. Per Caridad Cassidy's cardiology note, LDL is at goal and recommended to continue Repatha.    Placed Repatha 420mg on RIGHT arm. Pt denies any issues or adverse effects and reports he is still tolerating injection well.     As always patient was asked to remain on campus for 15 minutes after infusion. Patient reported feeling well when leaving clinic.     Will follow-up in 1 month for next injection.    Thank you,   Sheryl Hooks, PharmD  8/14/2023  08:30 EDT

## 2023-09-06 ENCOUNTER — SPECIALTY PHARMACY (OUTPATIENT)
Dept: PHARMACY | Facility: HOSPITAL | Age: 58
End: 2023-09-06
Payer: COMMERCIAL

## 2023-09-06 NOTE — PROGRESS NOTES
"    Specialty Pharmacy Refill Coordination Note      Name:  Sánchez Chandler  :  1965  Date:  2023         Past Medical History:   Diagnosis Date    HTN (hypertension)        Past Surgical History:   Procedure Laterality Date    SKIN GRAFT         Social History     Socioeconomic History    Marital status:    Tobacco Use    Smoking status: Never    Smokeless tobacco: Never   Vaping Use    Vaping Use: Never used   Substance and Sexual Activity    Alcohol use: No    Drug use: No    Sexual activity: Defer       Family History   Problem Relation Age of Onset    No Known Problems Mother     No Known Problems Father     No Known Problems Sister     No Known Problems Maternal Grandmother     No Known Problems Maternal Grandfather     No Known Problems Paternal Grandmother     No Known Problems Paternal Grandfather        Allergies   Allergen Reactions    Atorvastatin Hives and Rash     Pt states atorvastatin make \"itchy, red bumps appear all over his body\"    Penicillins        Current Outpatient Medications   Medication Sig Dispense Refill    acetaminophen (TYLENOL) 500 MG tablet Take 1 tablet by mouth 2 (Two) Times a Day As Needed for Mild Pain.      albuterol sulfate  (90 Base) MCG/ACT inhaler Inhale 2 puffs Every 4 (Four) Hours As Needed for Wheezing. 8 g 1    amLODIPine (NORVASC) 5 MG tablet Take 1 tablet by mouth Daily. 30 tablet 11    cyclobenzaprine (FLEXERIL) 10 MG tablet Take 1 tablet by mouth 3 (Three) Times a Day As Needed for Muscle Spasms for up to 15 doses. 15 tablet 0    Evolocumab with Infusor (Repatha Pushtronex System) solution cartridge Inject 420 mg under the skin into the appropriate area as directed Every 28 (Twenty-Eight) Days. 3.5 mL 5    Flaxseed, Linseed, (FLAXSEED OIL) 1000 MG capsule Take 1 capsule by mouth Daily.      metoprolol succinate XL (TOPROL-XL) 25 MG 24 hr tablet Take 1 tablet by mouth Daily. 30 tablet 11    naproxen (EC NAPROSYN) 500 MG EC tablet Take 1 " "tablet by mouth 2 (Two) Times a Day As Needed for Mild Pain. 12 tablet 0     No current facility-administered medications for this visit.         ASSESSMENT/PLAN:      Sánchez \"SHELLEY\" is a 57 y.o. male contacted today regarding refills of  Repatha specialty medication(s).    Reviewed and verified with patient:       Specialty medication(s) and dose(s) confirmed: yes    Refill Questions      Flowsheet Row Most Recent Value   Changes to allergies? No   Changes to medications? No   New conditions since last clinic visit No   Unplanned office visit, urgent care, ED, or hospital admission in the last 4 weeks  No   How does patient/caregiver feel medication is working? Very good   Financial problems or insurance changes  No   If yes, describe changes in insurance or financial issues. na   Since the previous refill, were any specialty medication doses or scheduled injections missed or delayed?  No   If yes, please provide the amount na   Why were doses missed? na   Does this patient require a clinical escalation to a pharmacist? No                  Medication Adherence    Adherence tools used: calendar   Other adherence tool: Pt receives injection in clinic monthly   Support network for adherence: healthcare provider          Follow-up: 30 day(s)     Patience Hatrman Pharmacy Technician  Specialty Pharmacy Technician               "

## 2023-09-11 ENCOUNTER — DISEASE STATE MANAGEMENT VISIT (OUTPATIENT)
Dept: PHARMACY | Facility: HOSPITAL | Age: 58
End: 2023-09-11
Payer: COMMERCIAL

## 2023-09-11 ENCOUNTER — SPECIALTY PHARMACY (OUTPATIENT)
Dept: PHARMACY | Facility: HOSPITAL | Age: 58
End: 2023-09-11
Payer: COMMERCIAL

## 2023-09-11 NOTE — PROGRESS NOTES
[facility]  Lipid Management Program - [facility]     Sánchez Chandler is a 57 y.o. male referred to the [facility] by Caridad Cassidy for clinical pharmacy and specialty pharmacy management of [facility].  Sánchez Chandler is treated for hyperlipidemia and currently does not take anything other than Repatha for his cholesterol.  In the past, Pt has tried Lipitor and Zetia and had allergic reaction of hives/rash with Lipitor. The patient denies any allergies to latex.  Patient reports continued tolerance of medication and denies any adverse effects. Patient has reports to clinic for injection and therefore has not missed any doses.       Relevant Past Medical History and Comorbidities  Past Medical History:   Diagnosis Date    HTN (hypertension)      Social History     Socioeconomic History    Marital status:    Tobacco Use    Smoking status: Never    Smokeless tobacco: Never   Vaping Use    Vaping Use: Never used   Substance and Sexual Activity    Alcohol use: No    Drug use: No    Sexual activity: Defer       Allergies  Atorvastatin and Penicillins    Current Medication List    Current Outpatient Medications:     acetaminophen (TYLENOL) 500 MG tablet, Take 1 tablet by mouth 2 (Two) Times a Day As Needed for Mild Pain., Disp: , Rfl:     albuterol sulfate  (90 Base) MCG/ACT inhaler, Inhale 2 puffs Every 4 (Four) Hours As Needed for Wheezing., Disp: 8 g, Rfl: 1    amLODIPine (NORVASC) 5 MG tablet, Take 1 tablet by mouth Daily., Disp: 30 tablet, Rfl: 11    cyclobenzaprine (FLEXERIL) 10 MG tablet, Take 1 tablet by mouth 3 (Three) Times a Day As Needed for Muscle Spasms for up to 15 doses., Disp: 15 tablet, Rfl: 0    Evolocumab with Infusor (Repatha Pushtronex System) solution cartridge, Inject 420 mg under the skin into the appropriate area as directed Every 28 (Twenty-Eight) Days., Disp: 3.5 mL, Rfl: 5    Flaxseed, Linseed, (FLAXSEED OIL) 1000 MG capsule, Take 1 capsule  by mouth Daily., Disp: , Rfl:     metoprolol succinate XL (TOPROL-XL) 25 MG 24 hr tablet, Take 1 tablet by mouth Daily., Disp: 30 tablet, Rfl: 11    naproxen (EC NAPROSYN) 500 MG EC tablet, Take 1 tablet by mouth 2 (Two) Times a Day As Needed for Mild Pain., Disp: 12 tablet, Rfl: 0    Drug Interactions  No known drug drug interactions with Repatha expected according to literature     Relevant Laboratory Values  Lab Results   Component Value Date    CHOL 129 03/28/2023    TRIG 126 03/28/2023    HDL 32 (L) 03/28/2023    LDL 74 03/28/2023       Medication Assessment & Plan    The patient would like to follow-up in clinic for next injection. Care Coordinator to set up future refill outreaches, coordinate prescription delivery, and escalate clinical questions to pharmacist.     Most recent LDL is 74 while taking Repatha on 3/28/23. Per Caridad Cassidy's cardiology note, LDL is at goal and recommended to continue Repatha.    Cristal Marc (PharmD student) placed Repatha 420mg on LEFT arm. Pt denies any issues or adverse effects and reports he is still tolerating injection well.     As always patient was asked to remain on campus for 15 minutes after infusion. Patient reported feeling well when leaving clinic.     Will follow-up in 1 month for next injection.    Thank you,   Sheryl Hooks, PharmD  9/11/2023  08:47 EDT

## 2023-09-11 NOTE — PROGRESS NOTES
[facility]  Lipid Management Program - [facility]     Sánchez Chandler is a 57 y.o. male referred to the [facility] by Caridad Cassidy for clinical pharmacy and specialty pharmacy management of [facility].  Sánchez Chandler is treated for hyperlipidemia and currently does not take anything other than Repatha for his cholesterol.  In the past, Pt has tried Lipitor and Zetia and had allergic reaction of hives/rash with Lipitor. The patient denies any allergies to latex.  Patient reports continued tolerance of medication and denies any adverse effects. Patient has reports to clinic for injection and therefore has not missed any doses.       Relevant Past Medical History and Comorbidities  Past Medical History:   Diagnosis Date    HTN (hypertension)      Social History     Socioeconomic History    Marital status:    Tobacco Use    Smoking status: Never    Smokeless tobacco: Never   Vaping Use    Vaping Use: Never used   Substance and Sexual Activity    Alcohol use: No    Drug use: No    Sexual activity: Defer       Allergies  Atorvastatin and Penicillins    Current Medication List    Current Outpatient Medications:     acetaminophen (TYLENOL) 500 MG tablet, Take 1 tablet by mouth 2 (Two) Times a Day As Needed for Mild Pain., Disp: , Rfl:     albuterol sulfate  (90 Base) MCG/ACT inhaler, Inhale 2 puffs Every 4 (Four) Hours As Needed for Wheezing., Disp: 8 g, Rfl: 1    amLODIPine (NORVASC) 5 MG tablet, Take 1 tablet by mouth Daily., Disp: 30 tablet, Rfl: 11    cyclobenzaprine (FLEXERIL) 10 MG tablet, Take 1 tablet by mouth 3 (Three) Times a Day As Needed for Muscle Spasms for up to 15 doses., Disp: 15 tablet, Rfl: 0    Evolocumab with Infusor (Repatha Pushtronex System) solution cartridge, Inject 420 mg under the skin into the appropriate area as directed Every 28 (Twenty-Eight) Days., Disp: 3.5 mL, Rfl: 5    Flaxseed, Linseed, (FLAXSEED OIL) 1000 MG capsule, Take 1 capsule  by mouth Daily., Disp: , Rfl:     metoprolol succinate XL (TOPROL-XL) 25 MG 24 hr tablet, Take 1 tablet by mouth Daily., Disp: 30 tablet, Rfl: 11    naproxen (EC NAPROSYN) 500 MG EC tablet, Take 1 tablet by mouth 2 (Two) Times a Day As Needed for Mild Pain., Disp: 12 tablet, Rfl: 0    Drug Interactions  No known drug drug interactions with Repatha expected according to literature     Relevant Laboratory Values  Lab Results   Component Value Date    CHOL 129 03/28/2023    TRIG 126 03/28/2023    HDL 32 (L) 03/28/2023    LDL 74 03/28/2023       Medication Assessment & Plan    The patient would like to follow-up in clinic for next injection. Care Coordinator to set up future refill outreaches, coordinate prescription delivery, and escalate clinical questions to pharmacist.     Most recent LDL is 74 while taking Repatha on 3/28/23. Per Caridad Cassidy's cardiology note, LDL is at goal and recommended to continue Repatha.    Cristal Marc (PharmD student) placed Repatha 420mg on LEFT arm. Pt denies any issues or adverse effects and reports he is still tolerating injection well.     As always patient was asked to remain on campus for 15 minutes after infusion. Patient reported feeling well when leaving clinic.     Will follow-up in 1 month for next injection.    Thank you,   Sheryl Hooks, PharmD  9/11/2023  08:54 EDT

## 2023-10-04 ENCOUNTER — SPECIALTY PHARMACY (OUTPATIENT)
Dept: PHARMACY | Facility: HOSPITAL | Age: 58
End: 2023-10-04
Payer: COMMERCIAL

## 2023-10-04 NOTE — PROGRESS NOTES
"    Specialty Pharmacy Refill Coordination Note      Name:  Sánchez Chandler  :  1965  Date:  10/4/2023         Past Medical History:   Diagnosis Date    HTN (hypertension)        Past Surgical History:   Procedure Laterality Date    SKIN GRAFT         Social History     Socioeconomic History    Marital status:    Tobacco Use    Smoking status: Never    Smokeless tobacco: Never   Vaping Use    Vaping Use: Never used   Substance and Sexual Activity    Alcohol use: No    Drug use: No    Sexual activity: Defer       Family History   Problem Relation Age of Onset    No Known Problems Mother     No Known Problems Father     No Known Problems Sister     No Known Problems Maternal Grandmother     No Known Problems Maternal Grandfather     No Known Problems Paternal Grandmother     No Known Problems Paternal Grandfather        Allergies   Allergen Reactions    Atorvastatin Hives and Rash     Pt states atorvastatin make \"itchy, red bumps appear all over his body\"    Penicillins        Current Outpatient Medications   Medication Sig Dispense Refill    acetaminophen (TYLENOL) 500 MG tablet Take 1 tablet by mouth 2 (Two) Times a Day As Needed for Mild Pain.      albuterol sulfate  (90 Base) MCG/ACT inhaler Inhale 2 puffs Every 4 (Four) Hours As Needed for Wheezing. 8 g 1    amLODIPine (NORVASC) 5 MG tablet Take 1 tablet by mouth Daily. 30 tablet 11    cyclobenzaprine (FLEXERIL) 10 MG tablet Take 1 tablet by mouth 3 (Three) Times a Day As Needed for Muscle Spasms for up to 15 doses. 15 tablet 0    Evolocumab with Infusor (REPATHA) solution cartridge Inject 3.5 mL under the skin into the appropriate area as directed Every 28 (Twenty-Eight) Days. 3.5 mL 5    Flaxseed, Linseed, (FLAXSEED OIL) 1000 MG capsule Take 1 capsule by mouth Daily.      metoprolol succinate XL (TOPROL-XL) 25 MG 24 hr tablet Take 1 tablet by mouth Daily. 30 tablet 11    naproxen (EC NAPROSYN) 500 MG EC tablet Take 1 tablet by mouth 2 " "(Two) Times a Day As Needed for Mild Pain. 12 tablet 0     No current facility-administered medications for this visit.         ASSESSMENT/PLAN:      Sánchez \"SHELLEY\" is a 57 y.o. male contacted today regarding refills of  Repatha pushtronex injection  specialty medication(s).    Reviewed and verified with patient:       Specialty medication(s) and dose(s) confirmed: yes    Refill Questions      Flowsheet Row Most Recent Value   Changes to allergies? No   Changes to medications? No   New conditions since last clinic visit No   Unplanned office visit, urgent care, ED, or hospital admission in the last 4 weeks  No   How does patient/caregiver feel medication is working? Very good   Financial problems or insurance changes  No   If yes, describe changes in insurance or financial issues. no   Since the previous refill, were any specialty medication doses or scheduled injections missed or delayed?  No   If yes, please provide the amount 0   Why were doses missed? n/a   Does this patient require a clinical escalation to a pharmacist? No                  Medication Adherence    Adherence tools used: calendar   Other adherence tool: Pt receives injection in clinic monthly   Support network for adherence: healthcare provider          Follow-up: 28 day(s)     Leila Escalera, Pharmacy Technician  Specialty Pharmacy Technician              "

## 2023-10-09 ENCOUNTER — SPECIALTY PHARMACY (OUTPATIENT)
Dept: PHARMACY | Facility: HOSPITAL | Age: 58
End: 2023-10-09
Payer: COMMERCIAL

## 2023-10-09 ENCOUNTER — DISEASE STATE MANAGEMENT VISIT (OUTPATIENT)
Dept: PHARMACY | Facility: HOSPITAL | Age: 58
End: 2023-10-09
Payer: COMMERCIAL

## 2023-10-09 NOTE — PROGRESS NOTES
[facility]  Lipid Management Program - [facility]     Sánchez Chandler is a 57 y.o. male referred to the [facility] by [facility] for clinical pharmacy and specialty pharmacy management of [facility].  Sánchez Chandler is treated for hyperlipidemia and currently does not take anything other than Repatha for his cholesterol.  In the past, Pt has tried Lipitor and Zetia and had allergic reaction of hives/rash with Lipitor. The patient denies any allergies to latex.  Patient reports continued tolerance of medication and denies any adverse effects. Patient has reports to clinic for injection and therefore has not missed any doses.       Relevant Past Medical History and Comorbidities  Past Medical History:   Diagnosis Date    HTN (hypertension)      Social History     Socioeconomic History    Marital status:    Tobacco Use    Smoking status: Never    Smokeless tobacco: Never   Vaping Use    Vaping Use: Never used   Substance and Sexual Activity    Alcohol use: No    Drug use: No    Sexual activity: Defer       Allergies  Atorvastatin and Penicillins    Current Medication List    Current Outpatient Medications:     acetaminophen (TYLENOL) 500 MG tablet, Take 1 tablet by mouth 2 (Two) Times a Day As Needed for Mild Pain., Disp: , Rfl:     albuterol sulfate  (90 Base) MCG/ACT inhaler, Inhale 2 puffs Every 4 (Four) Hours As Needed for Wheezing., Disp: 8 g, Rfl: 1    amLODIPine (NORVASC) 5 MG tablet, Take 1 tablet by mouth Daily., Disp: 30 tablet, Rfl: 11    cyclobenzaprine (FLEXERIL) 10 MG tablet, Take 1 tablet by mouth 3 (Three) Times a Day As Needed for Muscle Spasms for up to 15 doses., Disp: 15 tablet, Rfl: 0    Evolocumab with Infusor (REPATHA) solution cartridge, Inject 3.5 mL under the skin into the appropriate area as directed Every 28 (Twenty-Eight) Days., Disp: 3.5 mL, Rfl: 5    Flaxseed, Linseed, (FLAXSEED OIL) 1000 MG capsule, Take 1 capsule by mouth Daily.,  Disp: , Rfl:     metoprolol succinate XL (TOPROL-XL) 25 MG 24 hr tablet, Take 1 tablet by mouth Daily., Disp: 30 tablet, Rfl: 11    naproxen (EC NAPROSYN) 500 MG EC tablet, Take 1 tablet by mouth 2 (Two) Times a Day As Needed for Mild Pain., Disp: 12 tablet, Rfl: 0    Drug Interactions  No known drug drug interactions with Repatha expected according to literature     Relevant Laboratory Values  Lab Results   Component Value Date    CHOL 129 03/28/2023    TRIG 126 03/28/2023    HDL 32 (L) 03/28/2023    LDL 74 03/28/2023       Medication Assessment & Plan    The patient would like to follow-up in clinic for next injection. Care Coordinator to set up future refill outreaches, coordinate prescription delivery, and escalate clinical questions to pharmacist.     Most recent LDL is 74 while taking Repatha on 3/28/23. Per [facility]'s cardiology note, LDL is at goal and recommended to continue Repatha. Pts next cardiology appt is due for February 2024.     I placed Repatha 420mg on RIGHT arm. Pt denies any issues or adverse effects and reports he is still tolerating injection well.     As always patient was asked to remain on campus for 15 minutes after infusion. Patient reported feeling well when leaving clinic.     Will follow-up in 1 month for next injection.    Thank you,   Lavinia Tristan. Dillan, PharmD  10/9/2023  08:47 EDT

## 2023-10-09 NOTE — PROGRESS NOTES
[facility]  Lipid Management Program - [facility]     Sánchez Chandler is a 57 y.o. male referred to the [facility] by [facility] for clinical pharmacy and specialty pharmacy management of [facility].  Sánchez Chandler is treated for hyperlipidemia and currently does not take anything other than Repatha for his cholesterol.  In the past, Pt has tried Lipitor and Zetia and had allergic reaction of hives/rash with Lipitor. The patient denies any allergies to latex.  Patient reports continued tolerance of medication and denies any adverse effects. Patient has reports to clinic for injection and therefore has not missed any doses.       Relevant Past Medical History and Comorbidities  Past Medical History:   Diagnosis Date    HTN (hypertension)      Social History     Socioeconomic History    Marital status:    Tobacco Use    Smoking status: Never    Smokeless tobacco: Never   Vaping Use    Vaping Use: Never used   Substance and Sexual Activity    Alcohol use: No    Drug use: No    Sexual activity: Defer       Allergies  Atorvastatin and Penicillins    Current Medication List    Current Outpatient Medications:     acetaminophen (TYLENOL) 500 MG tablet, Take 1 tablet by mouth 2 (Two) Times a Day As Needed for Mild Pain., Disp: , Rfl:     albuterol sulfate  (90 Base) MCG/ACT inhaler, Inhale 2 puffs Every 4 (Four) Hours As Needed for Wheezing., Disp: 8 g, Rfl: 1    amLODIPine (NORVASC) 5 MG tablet, Take 1 tablet by mouth Daily., Disp: 30 tablet, Rfl: 11    cyclobenzaprine (FLEXERIL) 10 MG tablet, Take 1 tablet by mouth 3 (Three) Times a Day As Needed for Muscle Spasms for up to 15 doses., Disp: 15 tablet, Rfl: 0    Evolocumab with Infusor (REPATHA) solution cartridge, Inject 3.5 mL under the skin into the appropriate area as directed Every 28 (Twenty-Eight) Days., Disp: 3.5 mL, Rfl: 5    Flaxseed, Linseed, (FLAXSEED OIL) 1000 MG capsule, Take 1 capsule by mouth Daily.,  Disp: , Rfl:     metoprolol succinate XL (TOPROL-XL) 25 MG 24 hr tablet, Take 1 tablet by mouth Daily., Disp: 30 tablet, Rfl: 11    naproxen (EC NAPROSYN) 500 MG EC tablet, Take 1 tablet by mouth 2 (Two) Times a Day As Needed for Mild Pain., Disp: 12 tablet, Rfl: 0    Drug Interactions  No known drug drug interactions with Repatha expected according to literature     Relevant Laboratory Values  Lab Results   Component Value Date    CHOL 129 03/28/2023    TRIG 126 03/28/2023    HDL 32 (L) 03/28/2023    LDL 74 03/28/2023       Medication Assessment & Plan    The patient would like to follow-up in clinic for next injection. Care Coordinator to set up future refill outreaches, coordinate prescription delivery, and escalate clinical questions to pharmacist.     Most recent LDL is 74 while taking Repatha on 3/28/23. Per [facility]'s cardiology note, LDL is at goal and recommended to continue Repatha. Pts next cardiology appt is due for February 2024.     I placed Repatha 420mg on RIGHT arm. Pt denies any issues or adverse effects and reports he is still tolerating injection well.     As always patient was asked to remain on campus for 15 minutes after infusion. Patient reported feeling well when leaving clinic.     Will follow-up in 1 month for next injection.    Thank you,   Lavinia Tristan. Dillan, PharmD  10/9/2023  09:06 EDT

## 2023-10-10 DIAGNOSIS — I10 ESSENTIAL HYPERTENSION: ICD-10-CM

## 2023-10-11 RX ORDER — AMLODIPINE BESYLATE 5 MG/1
5 TABLET ORAL DAILY
Qty: 30 TABLET | Refills: 11 | Status: SHIPPED | OUTPATIENT
Start: 2023-10-11

## 2023-10-11 RX ORDER — METOPROLOL SUCCINATE 25 MG/1
25 TABLET, EXTENDED RELEASE ORAL DAILY
Qty: 30 TABLET | Refills: 11 | Status: SHIPPED | OUTPATIENT
Start: 2023-10-11

## 2023-11-01 ENCOUNTER — SPECIALTY PHARMACY (OUTPATIENT)
Dept: PHARMACY | Facility: HOSPITAL | Age: 58
End: 2023-11-01
Payer: COMMERCIAL

## 2023-11-01 NOTE — PROGRESS NOTES
"    Specialty Pharmacy Refill Coordination Note      Name:  Sánchez Chandler  :  1965  Date:  2023         Past Medical History:   Diagnosis Date    HTN (hypertension)        Past Surgical History:   Procedure Laterality Date    SKIN GRAFT         Social History     Socioeconomic History    Marital status:    Tobacco Use    Smoking status: Never    Smokeless tobacco: Never   Vaping Use    Vaping Use: Never used   Substance and Sexual Activity    Alcohol use: No    Drug use: No    Sexual activity: Defer       Family History   Problem Relation Age of Onset    No Known Problems Mother     No Known Problems Father     No Known Problems Sister     No Known Problems Maternal Grandmother     No Known Problems Maternal Grandfather     No Known Problems Paternal Grandmother     No Known Problems Paternal Grandfather        Allergies   Allergen Reactions    Atorvastatin Hives and Rash     Pt states atorvastatin make \"itchy, red bumps appear all over his body\"    Penicillins        Current Outpatient Medications   Medication Sig Dispense Refill    acetaminophen (TYLENOL) 500 MG tablet Take 1 tablet by mouth 2 (Two) Times a Day As Needed for Mild Pain.      albuterol sulfate  (90 Base) MCG/ACT inhaler Inhale 2 puffs Every 4 (Four) Hours As Needed for Wheezing. 8 g 1    amLODIPine (NORVASC) 5 MG tablet Take 1 tablet by mouth Daily. 30 tablet 11    cyclobenzaprine (FLEXERIL) 10 MG tablet Take 1 tablet by mouth 3 (Three) Times a Day As Needed for Muscle Spasms for up to 15 doses. 15 tablet 0    Evolocumab with Infusor (REPATHA) solution cartridge Inject 3.5 mL under the skin into the appropriate area as directed Every 28 (Twenty-Eight) Days. 3.5 mL 5    Flaxseed, Linseed, (FLAXSEED OIL) 1000 MG capsule Take 1 capsule by mouth Daily.      metoprolol succinate XL (TOPROL-XL) 25 MG 24 hr tablet Take 1 tablet by mouth Daily. 30 tablet 11    naproxen (EC NAPROSYN) 500 MG EC tablet Take 1 tablet by mouth 2 " "(Two) Times a Day As Needed for Mild Pain. 12 tablet 0     No current facility-administered medications for this visit.         ASSESSMENT/PLAN:      Sánchez \"SHELLEY\" is a 57 y.o. male contacted today regarding refills of  Repatha pushtronex injection  specialty medication(s).    Reviewed and verified with patient:       Specialty medication(s) and dose(s) confirmed: yes    Refill Questions      Flowsheet Row Most Recent Value   Changes to allergies? No   Changes to medications? No   New conditions since last clinic visit No   Unplanned office visit, urgent care, ED, or hospital admission in the last 4 weeks  No   How does patient/caregiver feel medication is working? Very good   Financial problems or insurance changes  No   If yes, describe changes in insurance or financial issues. no   Since the previous refill, were any specialty medication doses or scheduled injections missed or delayed?  No   If yes, please provide the amount 0   Why were doses missed? n/a   Does this patient require a clinical escalation to a pharmacist? No                  Medication Adherence          Adherence tools used: calendar   Other adherence tool: Pt receives injection in clinic monthly   Support network for adherence: healthcare provider                Follow-up: 28 day(s)     Leila Escalera, Pharmacy Technician  Specialty Pharmacy Technician              "

## 2023-11-06 ENCOUNTER — DISEASE STATE MANAGEMENT VISIT (OUTPATIENT)
Dept: PHARMACY | Facility: HOSPITAL | Age: 58
End: 2023-11-06
Payer: COMMERCIAL

## 2023-11-06 NOTE — PROGRESS NOTES
[facility]  [facility]id Management Program - [facility]     Sánchez Chandler is a 57 y.o. male referred to the [facility] by [facility] for clinical pharmacy and specialty pharmacy management of [facility].  Sánchez Chandler is treated for hyperlipidemia and currently does not take anything other than Repatha for his cholesterol.  In the past, Pt has tried Lipitor and Zetia and had allergic reaction of hives/rash with Lipitor. The patient denies any allergies to latex.  Patient reports continued tolerance of medication and denies any adverse effects. Patient has reports to clinic for injection and therefore has not missed any doses.       Relevant Past Medical History and Comorbidities  Past Medical History:   Diagnosis Date    HTN (hypertension)      Social History     Socioeconomic History    Marital status:    Tobacco Use    Smoking status: Never    Smokeless tobacco: Never   Vaping Use    Vaping Use: Never used   Substance and Sexual Activity    Alcohol use: No    Drug use: No    Sexual activity: Defer       Allergies  Atorvastatin and Penicillins    Current Medication List    Current Outpatient Medications:     acetaminophen (TYLENOL) 500 MG tablet, Take 1 tablet by mouth 2 (Two) Times a Day As Needed for Mild Pain., Disp: , Rfl:     albuterol sulfate  (90 Base) MCG/ACT inhaler, Inhale 2 puffs Every 4 (Four) Hours As Needed for Wheezing., Disp: 8 g, Rfl: 1    amLODIPine (NORVASC) 5 MG tablet, Take 1 tablet by mouth Daily., Disp: 30 tablet, Rfl: 11    cyclobenzaprine (FLEXERIL) 10 MG tablet, Take 1 tablet by mouth 3 (Three) Times a Day As Needed for Muscle Spasms for up to 15 doses., Disp: 15 tablet, Rfl: 0    Evolocumab with Infusor (REPATHA) solution cartridge, Inject 3.5 mL under the skin into the appropriate area as directed Every 28 (Twenty-Eight) Days., Disp: 3.5 mL, Rfl: 5    Flaxseed, Linseed, (FLAXSEED OIL) 1000 MG capsule, Take 1 capsule by mouth Daily.,  Disp: , Rfl:     metoprolol succinate XL (TOPROL-XL) 25 MG 24 hr tablet, Take 1 tablet by mouth Daily., Disp: 30 tablet, Rfl: 11    naproxen (EC NAPROSYN) 500 MG EC tablet, Take 1 tablet by mouth 2 (Two) Times a Day As Needed for Mild Pain., Disp: 12 tablet, Rfl: 0    Drug Interactions  No known drug drug interactions with Repatha expected according to literature     Relevant Laboratory Values  Lab Results   Component Value Date    CHOL 129 03/28/2023    TRIG 126 03/28/2023    HDL 32 (L) 03/28/2023    LDL 74 03/28/2023       Medication Assessment & Plan    The patient would like to follow-up in clinic for next injection. Care Coordinator to set up future refill outreaches, coordinate prescription delivery, and escalate clinical questions to pharmacist.     Most recent LDL is 74 while taking Repatha on 3/28/23. Per [facility]'s cardiology note, LDL is at goal and recommended to continue Repatha. Pts next cardiology appt is due for February 2024.     I placed Repatha 420mg on LEFT arm. Pt denies any issues or adverse effects and reports he is still tolerating injection well.     As always patient was asked to remain on campus for 15 minutes after infusion. Patient reported feeling well when leaving clinic.     Will follow-up in 1 month for next injection.    Thank you,   Alma Rosa Serna, PharmD  11/6/2023  08:29 EST

## 2023-11-29 ENCOUNTER — SPECIALTY PHARMACY (OUTPATIENT)
Dept: PHARMACY | Facility: HOSPITAL | Age: 58
End: 2023-11-29
Payer: COMMERCIAL

## 2023-11-29 NOTE — PROGRESS NOTES
"Specialty Pharmacy Refill Coordination Note     Sánchez \"SHELLEY\" is a 58 y.o. male contacted today regarding refills of  Repatha specialty medication(s).    Reviewed and verified with patient:       Specialty medication(s) and dose(s) confirmed: yes    Refill Questions      Flowsheet Row Most Recent Value   Changes to allergies? No   Changes to medications? No   New conditions since last clinic visit No   Unplanned office visit, urgent care, ED, or hospital admission in the last 4 weeks  No   How does patient/caregiver feel medication is working? Very good   Financial problems or insurance changes  No   If yes, describe changes in insurance or financial issues. na   Since the previous refill, were any specialty medication doses or scheduled injections missed or delayed?  No   If yes, please provide the amount na   Why were doses missed? na   Does this patient require a clinical escalation to a pharmacist? No                  Medication Adherence          Adherence tools used: calendar   Other adherence tool: Pt receives injection in clinic monthly   Support network for adherence: healthcare provider                Follow-up: 28 day(s)     Patience Hartman, Pharmacy Technician  Specialty Pharmacy Technician        "

## 2023-12-04 ENCOUNTER — DISEASE STATE MANAGEMENT VISIT (OUTPATIENT)
Dept: PHARMACY | Facility: HOSPITAL | Age: 58
End: 2023-12-04
Payer: COMMERCIAL

## 2023-12-04 NOTE — PROGRESS NOTES
[facility]  Lipid Management Program - [facility]     Sánchez Chandler is a 58 y.o. male referred to the [facility] by [facility] for clinical pharmacy and specialty pharmacy management of [facility].  Sánchez Chandler is treated for hyperlipidemia and currently does not take anything other than Repatha for his cholesterol.  In the past, Pt has tried Lipitor and Zetia and had allergic reaction of hives/rash with Lipitor. The patient denies any allergies to latex.  Patient reports continued tolerance of medication and denies any adverse effects. Patient has reports to clinic for injection and therefore has not missed any doses.       Relevant Past Medical History and Comorbidities  Past Medical History:   Diagnosis Date    HTN (hypertension)      Social History     Socioeconomic History    Marital status:    Tobacco Use    Smoking status: Never    Smokeless tobacco: Never   Vaping Use    Vaping Use: Never used   Substance and Sexual Activity    Alcohol use: No    Drug use: No    Sexual activity: Defer       Allergies  Atorvastatin and Penicillins    Current Medication List    Current Outpatient Medications:     acetaminophen (TYLENOL) 500 MG tablet, Take 1 tablet by mouth 2 (Two) Times a Day As Needed for Mild Pain., Disp: , Rfl:     albuterol sulfate  (90 Base) MCG/ACT inhaler, Inhale 2 puffs Every 4 (Four) Hours As Needed for Wheezing., Disp: 8 g, Rfl: 1    amLODIPine (NORVASC) 5 MG tablet, Take 1 tablet by mouth Daily., Disp: 30 tablet, Rfl: 11    cyclobenzaprine (FLEXERIL) 10 MG tablet, Take 1 tablet by mouth 3 (Three) Times a Day As Needed for Muscle Spasms for up to 15 doses., Disp: 15 tablet, Rfl: 0    Evolocumab with Infusor (REPATHA) solution cartridge, Inject 3.5 mL under the skin into the appropriate area as directed Every 28 (Twenty-Eight) Days., Disp: 3.5 mL, Rfl: 5    Flaxseed, Linseed, (FLAXSEED OIL) 1000 MG capsule, Take 1 capsule by mouth Daily.,  Disp: , Rfl:     metoprolol succinate XL (TOPROL-XL) 25 MG 24 hr tablet, Take 1 tablet by mouth Daily., Disp: 30 tablet, Rfl: 11    naproxen (EC NAPROSYN) 500 MG EC tablet, Take 1 tablet by mouth 2 (Two) Times a Day As Needed for Mild Pain., Disp: 12 tablet, Rfl: 0    Drug Interactions  No known drug drug interactions with Repatha expected according to literature     Relevant Laboratory Values  Lab Results   Component Value Date    CHOL 129 03/28/2023    TRIG 126 03/28/2023    HDL 32 (L) 03/28/2023    LDL 74 03/28/2023       Medication Assessment & Plan    The patient would like to follow-up in clinic for next injection. Care Coordinator to set up future refill outreaches, coordinate prescription delivery, and escalate clinical questions to pharmacist.     Most recent LDL is 74 while taking Repatha on 3/28/23. Per [facility]'s cardiology note, LDL is at goal and recommended to continue Repatha. Pts next cardiology appt is due for February 2024.     I placed Repatha 420mg on RIGHT arm. Pt denies any issues or adverse effects and reports he is still tolerating injection well.     As always patient was asked to remain on campus for 15 minutes after infusion. Patient reported feeling well when leaving clinic.     Will follow-up in 1 month for next injection.    Thank you,   Lavinia Tristan. Dillan, PharmD  12/4/2023  08:35 EST

## 2023-12-27 ENCOUNTER — SPECIALTY PHARMACY (OUTPATIENT)
Dept: PHARMACY | Facility: HOSPITAL | Age: 58
End: 2023-12-27
Payer: COMMERCIAL

## 2024-01-02 ENCOUNTER — DISEASE STATE MANAGEMENT VISIT (OUTPATIENT)
Dept: PHARMACY | Facility: HOSPITAL | Age: 59
End: 2024-01-02
Payer: COMMERCIAL

## 2024-01-02 NOTE — PROGRESS NOTES
[facility]  Lipid Management Program - [facility]     Sánchez Chandler is a 58 y.o. male referred to the [facility] by [facility] for clinical pharmacy and specialty pharmacy management of [facility].  Sánchez Chandler is treated for hyperlipidemia and currently does not take anything other than Repatha for his cholesterol.  In the past, Pt has tried Lipitor and Zetia and had allergic reaction of hives/rash with Lipitor. The patient denies any allergies to latex.  Patient reports continued tolerance of medication and denies any adverse effects. Patient has reports to clinic for injection and therefore has not missed any doses.       Relevant Past Medical History and Comorbidities  Past Medical History:   Diagnosis Date    HTN (hypertension)      Social History     Socioeconomic History    Marital status:    Tobacco Use    Smoking status: Never    Smokeless tobacco: Never   Vaping Use    Vaping Use: Never used   Substance and Sexual Activity    Alcohol use: No    Drug use: No    Sexual activity: Defer       Allergies  Atorvastatin and Penicillins    Current Medication List    Current Outpatient Medications:     acetaminophen (TYLENOL) 500 MG tablet, Take 1 tablet by mouth 2 (Two) Times a Day As Needed for Mild Pain., Disp: , Rfl:     albuterol sulfate  (90 Base) MCG/ACT inhaler, Inhale 2 puffs Every 4 (Four) Hours As Needed for Wheezing., Disp: 8 g, Rfl: 1    amLODIPine (NORVASC) 5 MG tablet, Take 1 tablet by mouth Daily., Disp: 30 tablet, Rfl: 11    cyclobenzaprine (FLEXERIL) 10 MG tablet, Take 1 tablet by mouth 3 (Three) Times a Day As Needed for Muscle Spasms for up to 15 doses., Disp: 15 tablet, Rfl: 0    Evolocumab with Infusor (REPATHA) solution cartridge, Inject 3.5 mL under the skin into the appropriate area as directed Every 28 (Twenty-Eight) Days., Disp: 3.5 mL, Rfl: 5    Flaxseed, Linseed, (FLAXSEED OIL) 1000 MG capsule, Take 1 capsule by mouth Daily.,  Disp: , Rfl:     metoprolol succinate XL (TOPROL-XL) 25 MG 24 hr tablet, Take 1 tablet by mouth Daily., Disp: 30 tablet, Rfl: 11    naproxen (EC NAPROSYN) 500 MG EC tablet, Take 1 tablet by mouth 2 (Two) Times a Day As Needed for Mild Pain., Disp: 12 tablet, Rfl: 0    Drug Interactions  No known drug drug interactions with Repatha expected according to literature     Relevant Laboratory Values  Lab Results   Component Value Date    CHOL 129 03/28/2023    TRIG 126 03/28/2023    HDL 32 (L) 03/28/2023    LDL 74 03/28/2023       Medication Assessment & Plan    The patient would like to follow-up in clinic for next injection. Care Coordinator to set up future refill outreaches, coordinate prescription delivery, and escalate clinical questions to pharmacist.     Most recent LDL is 74 while taking Repatha on 3/28/23. Per [facility] Cassidy's cardiology note, LDL is at goal and recommended to continue Repatha. Pts next cardiology appt is due for February 2024.     I placed Repatha 420mg on LEFT arm. Pt denies any issues or adverse effects and reports he is still tolerating injection well.     As always patient was asked to remain on campus for 15 minutes after infusion. Patient reported feeling well when leaving clinic.     Will follow-up in 1 month for next injection.    Thank you,   Lavinia Tristan. Dillan, PharmD  1/2/2024  08:40 EST

## 2024-01-08 DIAGNOSIS — I10 ESSENTIAL HYPERTENSION: ICD-10-CM

## 2024-01-09 RX ORDER — AMLODIPINE BESYLATE 5 MG/1
5 TABLET ORAL DAILY
Qty: 30 TABLET | Refills: 11 | Status: SHIPPED | OUTPATIENT
Start: 2024-01-09

## 2024-01-09 RX ORDER — METOPROLOL SUCCINATE 25 MG/1
25 TABLET, EXTENDED RELEASE ORAL DAILY
Qty: 30 TABLET | Refills: 11 | Status: SHIPPED | OUTPATIENT
Start: 2024-01-09

## 2024-01-25 ENCOUNTER — SPECIALTY PHARMACY (OUTPATIENT)
Dept: PHARMACY | Facility: HOSPITAL | Age: 59
End: 2024-01-25
Payer: COMMERCIAL

## 2024-01-25 NOTE — PROGRESS NOTES
"Specialty Pharmacy Refill Coordination Note     Sánchez \"SHELLEY\" is a 58 y.o. male contacted today regarding refills of  repatha specialty medication(s).    Reviewed and verified with patient:       Specialty medication(s) and dose(s) confirmed: yes    Refill Questions      Flowsheet Row Most Recent Value   Changes to allergies? No   Changes to medications? No   New conditions or infections since last clinic visit No   Unplanned office visit, urgent care, ED, or hospital admission in the last 4 weeks  No   How does patient/caregiver feel medication is working? Very good   Financial problems or insurance changes  No   If yes, describe changes in insurance or financial issues. na   Since the previous refill, were any specialty medication doses or scheduled injections missed or delayed?  No   If yes, please provide the amount na   Why were doses missed? na   Does this patient require a clinical escalation to a pharmacist? No            Delivery Questions      Flowsheet Row Most Recent Value   Copay verified? No   Copay amount na   Copay form of payment No copayment ($0)              Medication Adherence          Adherence tools used: calendar   Other adherence tool: Pt receives injection in clinic monthly   Support network for adherence: healthcare provider                Follow-up: 30 day(s)     Patience Hartman Pharmacy Technician  Specialty Pharmacy Technician        "

## 2024-01-30 ENCOUNTER — DISEASE STATE MANAGEMENT VISIT (OUTPATIENT)
Dept: PHARMACY | Facility: HOSPITAL | Age: 59
End: 2024-01-30
Payer: COMMERCIAL

## 2024-01-30 NOTE — PROGRESS NOTES
[facility]  Lipid Management Program - [facility]     Sánchez Chandler is a 58 y.o. male referred to the [facility] by [facility]idy for clinical pharmacy and specialty pharmacy management of [facility].  Sánchez Chandler is treated for hyperlipidemia and currently does not take anything other than Repatha for his cholesterol.  In the past, Pt has tried Lipitor and Zetia and had allergic reaction of hives/rash with Lipitor. The patient denies any allergies to latex.  Patient reports continued tolerance of medication and denies any adverse effects. Patient has reports to clinic for injection and therefore has not missed any doses.       Relevant Past Medical History and Comorbidities  Past Medical History:   Diagnosis Date    HTN (hypertension)      Social History     Socioeconomic History    Marital status:    Tobacco Use    Smoking status: Never    Smokeless tobacco: Never   Vaping Use    Vaping Use: Never used   Substance and Sexual Activity    Alcohol use: No    Drug use: No    Sexual activity: Defer       Allergies  Atorvastatin and Penicillins    Current Medication List    Current Outpatient Medications:     acetaminophen (TYLENOL) 500 MG tablet, Take 1 tablet by mouth 2 (Two) Times a Day As Needed for Mild Pain., Disp: , Rfl:     albuterol sulfate  (90 Base) MCG/ACT inhaler, Inhale 2 puffs Every 4 (Four) Hours As Needed for Wheezing., Disp: 8 g, Rfl: 1    amLODIPine (NORVASC) 5 MG tablet, Take 1 tablet by mouth Daily., Disp: 30 tablet, Rfl: 11    cyclobenzaprine (FLEXERIL) 10 MG tablet, Take 1 tablet by mouth 3 (Three) Times a Day As Needed for Muscle Spasms for up to 15 doses., Disp: 15 tablet, Rfl: 0    Evolocumab with Infusor (REPATHA) solution cartridge, Inject 3.5 mL under the skin into the appropriate area as directed Every 28 (Twenty-Eight) Days., Disp: 3.5 mL, Rfl: 5    Flaxseed, Linseed, (FLAXSEED OIL) 1000 MG capsule, Take 1 capsule by mouth Daily.,  Disp: , Rfl:     metoprolol succinate XL (TOPROL-XL) 25 MG 24 hr tablet, Take 1 tablet by mouth Daily., Disp: 30 tablet, Rfl: 11    naproxen (EC NAPROSYN) 500 MG EC tablet, Take 1 tablet by mouth 2 (Two) Times a Day As Needed for Mild Pain., Disp: 12 tablet, Rfl: 0    Drug Interactions  No known drug drug interactions with Repatha expected according to literature     Relevant Laboratory Values  Lab Results   Component Value Date    CHOL 129 03/28/2023    TRIG 126 03/28/2023    HDL 32 (L) 03/28/2023    LDL 74 03/28/2023       Medication Assessment & Plan    The patient would like to follow-up in clinic for next injection. Care Coordinator to set up future refill outreaches, coordinate prescription delivery, and escalate clinical questions to pharmacist.     Most recent LDL is 74 while taking Repatha on 3/28/23. Per [facility]'s cardiology note, LDL is at goal and recommended to continue Repatha. Pts next cardiology appointment is scheduled for 2/12/24.     I placed Repatha 420mg on RIGHT arm. Pt denies any issues or adverse effects and reports he is still tolerating injection well.     As always patient was asked to remain on campus for 15 minutes after infusion. Patient reported feeling well when leaving clinic.     Will follow-up in 1 month for next injection.    Thank you,   Sheryl Hooks, PharmD  1/30/2024  09:02 EST

## 2024-01-31 NOTE — PROGRESS NOTES
"Specialty Pharmacy Refill Coordination Note     Sánchez \"SHELLEY\" is a 58 y.o. male contacted today regarding refills of  Repatha specialty medication(s).    Reviewed and verified with patient:       Specialty medication(s) and dose(s) confirmed: yes    Refill Questions      Flowsheet Row Most Recent Value   Changes to allergies? No   Changes to medications? No   New conditions or infections since last clinic visit No   Unplanned office visit, urgent care, ED, or hospital admission in the last 4 weeks  No   How does patient/caregiver feel medication is working? Very good   Financial problems or insurance changes  No   If yes, describe changes in insurance or financial issues. na   Since the previous refill, were any specialty medication doses or scheduled injections missed or delayed?  No   If yes, please provide the amount na   Why were doses missed? na   Does this patient require a clinical escalation to a pharmacist? No                  Medication Adherence          Adherence tools used: calendar   Other adherence tool: Pt receives injection in clinic monthly   Support network for adherence: healthcare provider                Follow-up: 28 day(s)     Dottie Kern, Pharmacy Technician  Specialty Pharmacy Technician        " Called pt she is asking to have a order put in the system to have a scan to see if the blood clots are gone pt not sure what type of scan she needs     Please advise

## 2024-02-12 ENCOUNTER — OFFICE VISIT (OUTPATIENT)
Dept: CARDIOLOGY | Facility: CLINIC | Age: 59
End: 2024-02-12
Payer: COMMERCIAL

## 2024-02-12 VITALS
DIASTOLIC BLOOD PRESSURE: 79 MMHG | BODY MASS INDEX: 33.72 KG/M2 | OXYGEN SATURATION: 96 % | HEART RATE: 103 BPM | SYSTOLIC BLOOD PRESSURE: 154 MMHG | WEIGHT: 249 LBS | HEIGHT: 72 IN

## 2024-02-12 DIAGNOSIS — E78.5 DYSLIPIDEMIA: ICD-10-CM

## 2024-02-12 DIAGNOSIS — I10 ESSENTIAL HYPERTENSION: Primary | ICD-10-CM

## 2024-02-12 PROCEDURE — 1160F RVW MEDS BY RX/DR IN RCRD: CPT | Performed by: NURSE PRACTITIONER

## 2024-02-12 PROCEDURE — 1159F MED LIST DOCD IN RCRD: CPT | Performed by: NURSE PRACTITIONER

## 2024-02-12 PROCEDURE — 3077F SYST BP >= 140 MM HG: CPT | Performed by: NURSE PRACTITIONER

## 2024-02-12 PROCEDURE — 93000 ELECTROCARDIOGRAM COMPLETE: CPT | Performed by: NURSE PRACTITIONER

## 2024-02-12 PROCEDURE — 3078F DIAST BP <80 MM HG: CPT | Performed by: NURSE PRACTITIONER

## 2024-02-12 PROCEDURE — 99214 OFFICE O/P EST MOD 30 MIN: CPT | Performed by: NURSE PRACTITIONER

## 2024-02-12 RX ORDER — METOPROLOL SUCCINATE 50 MG/1
50 TABLET, EXTENDED RELEASE ORAL DAILY
Qty: 30 TABLET | Refills: 11 | Status: SHIPPED | OUTPATIENT
Start: 2024-02-12

## 2024-02-26 ENCOUNTER — SPECIALTY PHARMACY (OUTPATIENT)
Dept: PHARMACY | Facility: HOSPITAL | Age: 59
End: 2024-02-26
Payer: COMMERCIAL

## 2024-02-26 NOTE — PROGRESS NOTES
"Specialty Pharmacy Refill Coordination Note     Sánchez \"SHELLEY\" is a 58 y.o. male contacted today regarding refills of  Repatha [facility] specialty medication(s).    Reviewed and verified with patient:       Specialty medication(s) and dose(s) confirmed: yes    Refill Questions      Flowsheet Row Most Recent Value   Changes to allergies? No   Changes to medications? No   New conditions or infections since last clinic visit No   Unplanned office visit, urgent care, ED, or hospital admission in the last 4 weeks  No   How does patient/caregiver feel medication is working? Very good   Financial problems or insurance changes  No   If yes, describe changes in insurance or financial issues. na   Since the previous refill, were any specialty medication doses or scheduled injections missed or delayed?  No   If yes, please provide the amount na   Why were doses missed? na   Does this patient require a clinical escalation to a pharmacist? No            Delivery Questions      Flowsheet Row Most Recent Value   Copay verified? No   Copay amount 0.00   Copay form of payment No copayment ($0)              Medication Adherence          Adherence tools used: calendar   Other adherence tool: Pt receives injection in clinic monthly   Support network for adherence: healthcare provider                Follow-up: 28 day(s)     Dottie Kern, Pharmacy Technician  Specialty Pharmacy Technician        "

## 2024-02-27 ENCOUNTER — DISEASE STATE MANAGEMENT VISIT (OUTPATIENT)
Dept: PHARMACY | Facility: HOSPITAL | Age: 59
End: 2024-02-27
Payer: COMMERCIAL

## 2024-02-27 ENCOUNTER — SPECIALTY PHARMACY (OUTPATIENT)
Dept: PHARMACY | Facility: HOSPITAL | Age: 59
End: 2024-02-27
Payer: COMMERCIAL

## 2024-02-27 NOTE — PROGRESS NOTES
[facility]  Lipid Management Program - [facility]     Sánchez Chandler is a 58 y.o. male referred to the [facility] by [facility]idy for clinical pharmacy and specialty pharmacy management of [facility].  Sánchez Chandler is treated for hyperlipidemia and currently does not take anything other than Repatha for his cholesterol.  In the past, Pt has tried Lipitor and Zetia and had allergic reaction of hives/rash with Lipitor. The patient denies any allergies to latex.  Patient reports continued tolerance of medication and denies any adverse effects. Patient has reports to clinic for injection and therefore has not missed any doses.     Relevant Past Medical History and Comorbidities  Past Medical History:   Diagnosis Date    HTN (hypertension)      Social History     Socioeconomic History    Marital status:    Tobacco Use    Smoking status: Never    Smokeless tobacco: Never   Vaping Use    Vaping Use: Never used   Substance and Sexual Activity    Alcohol use: No    Drug use: No    Sexual activity: Defer       Allergies  Atorvastatin and Penicillins    Current Medication List    Current Outpatient Medications:     acetaminophen (TYLENOL) 500 MG tablet, Take 1 tablet by mouth 2 (Two) Times a Day As Needed for Mild Pain., Disp: , Rfl:     albuterol sulfate  (90 Base) MCG/ACT inhaler, Inhale 2 puffs Every 4 (Four) Hours As Needed for Wheezing., Disp: 8 g, Rfl: 1    amLODIPine (NORVASC) 5 MG tablet, Take 1 tablet by mouth Daily., Disp: 30 tablet, Rfl: 11    cyclobenzaprine (FLEXERIL) 10 MG tablet, Take 1 tablet by mouth 3 (Three) Times a Day As Needed for Muscle Spasms for up to 15 doses., Disp: 15 tablet, Rfl: 0    Evolocumab with Infusor (REPATHA) solution cartridge, Inject 3.5 mL under the skin into the appropriate area as directed Every 28 (Twenty-Eight) Days., Disp: 3.5 mL, Rfl: 5    Flaxseed, Linseed, (FLAXSEED OIL) 1000 MG capsule, Take 1 capsule by mouth Daily.,  Disp: , Rfl:     metoprolol succinate XL (TOPROL-XL) 50 MG 24 hr tablet, Take 1 tablet by mouth Daily., Disp: 30 tablet, Rfl: 11    naproxen (EC NAPROSYN) 500 MG EC tablet, Take 1 tablet by mouth 2 (Two) Times a Day As Needed for Mild Pain., Disp: 12 tablet, Rfl: 0    Drug Interactions  No known drug drug interactions with Repatha expected according to literature     Relevant Laboratory Values  Lab Results   Component Value Date    CHOL 129 03/28/2023    TRIG 126 03/28/2023    HDL 32 (L) 03/28/2023    LDL 74 03/28/2023       Medication Assessment & Plan    The patient would like to follow-up in clinic for next injection. Care Coordinator to set up future refill outreaches, coordinate prescription delivery, and escalate clinical questions to pharmacist.     Most recent LDL is 74 while taking Repatha on 3/28/23. Per [facility]'s cardiology note, LDL is at goal and recommended to continue Repatha. Pts next cardiology appointment is scheduled for 2/12/24.     I placed Repatha 420mg on LEFT arm. Pt denies any issues or adverse effects and reports he is still tolerating injection well.     As always patient was asked to remain on campus for 15 minutes after infusion. Patient reported feeling well when leaving clinic.     Will follow-up in 1 month for next injection.    Thank you,     Nay Mcneill RPH  2/27/2024  08:53 EST

## 2024-02-27 NOTE — PROGRESS NOTES
[facility]  Lipid Management Program - [facility]     Sánchez Chandler is a 58 y.o. male referred to the [facility] by [facility]idy for clinical pharmacy and specialty pharmacy management of [facility].  Sánchez Chandler is treated for hyperlipidemia and currently does not take anything other than Repatha for his cholesterol.  In the past, Pt has tried Lipitor and Zetia and had allergic reaction of hives/rash with Lipitor. The patient denies any allergies to latex.  Patient reports continued tolerance of medication and denies any adverse effects. Patient has reports to clinic for injection and therefore has not missed any doses.     Relevant Past Medical History and Comorbidities  Past Medical History:   Diagnosis Date    HTN (hypertension)      Social History     Socioeconomic History    Marital status:    Tobacco Use    Smoking status: Never    Smokeless tobacco: Never   Vaping Use    Vaping Use: Never used   Substance and Sexual Activity    Alcohol use: No    Drug use: No    Sexual activity: Defer       Allergies  Atorvastatin and Penicillins    Current Medication List    Current Outpatient Medications:     acetaminophen (TYLENOL) 500 MG tablet, Take 1 tablet by mouth 2 (Two) Times a Day As Needed for Mild Pain., Disp: , Rfl:     albuterol sulfate  (90 Base) MCG/ACT inhaler, Inhale 2 puffs Every 4 (Four) Hours As Needed for Wheezing., Disp: 8 g, Rfl: 1    amLODIPine (NORVASC) 5 MG tablet, Take 1 tablet by mouth Daily., Disp: 30 tablet, Rfl: 11    cyclobenzaprine (FLEXERIL) 10 MG tablet, Take 1 tablet by mouth 3 (Three) Times a Day As Needed for Muscle Spasms for up to 15 doses., Disp: 15 tablet, Rfl: 0    Evolocumab with Infusor (REPATHA) solution cartridge, Inject 3.5 mL under the skin into the appropriate area as directed Every 28 (Twenty-Eight) Days., Disp: 3.5 mL, Rfl: 5    Flaxseed, Linseed, (FLAXSEED OIL) 1000 MG capsule, Take 1 capsule by mouth Daily.,  Disp: , Rfl:     metoprolol succinate XL (TOPROL-XL) 50 MG 24 hr tablet, Take 1 tablet by mouth Daily., Disp: 30 tablet, Rfl: 11    naproxen (EC NAPROSYN) 500 MG EC tablet, Take 1 tablet by mouth 2 (Two) Times a Day As Needed for Mild Pain., Disp: 12 tablet, Rfl: 0    Drug Interactions  No known drug drug interactions with Repatha expected according to literature     Relevant Laboratory Values  Lab Results   Component Value Date    CHOL 129 03/28/2023    TRIG 126 03/28/2023    HDL 32 (L) 03/28/2023    LDL 74 03/28/2023       Medication Assessment & Plan    The patient would like to follow-up in clinic for next injection. Care Coordinator to set up future refill outreaches, coordinate prescription delivery, and escalate clinical questions to pharmacist.     Most recent LDL is 74 while taking Repatha on 3/28/23. Per [facility]'s cardiology note, LDL is at goal and recommended to continue Repatha. Pts next cardiology appointment is scheduled for 2/12/24.     I placed Repatha 420mg on LEFT arm. Pt denies any issues or adverse effects and reports he is still tolerating injection well.     As always patient was asked to remain on campus for 15 minutes after infusion. Patient reported feeling well when leaving clinic.     Will follow-up in 1 month for next injection.    Thank you,     Nay Mcneill RPH  2/27/2024  08:36 EST

## 2024-03-21 ENCOUNTER — SPECIALTY PHARMACY (OUTPATIENT)
Dept: PHARMACY | Facility: HOSPITAL | Age: 59
End: 2024-03-21
Payer: COMMERCIAL

## 2024-03-21 NOTE — PROGRESS NOTES
"Specialty Pharmacy Refill Coordination Note     Sánchez \"SHELLEY\" is a 58 y.o. male contacted today regarding refills of  Repatha [facility] specialty medication(s).    Reviewed and verified with patient:       Specialty medication(s) and dose(s) confirmed: yes    Refill Questions      Flowsheet Row Most Recent Value   Changes to allergies? No   Changes to medications? No   New conditions or infections since last clinic visit No   Unplanned office visit, urgent care, ED, or hospital admission in the last 4 weeks  No   How does patient/caregiver feel medication is working? Very good   Financial problems or insurance changes  No   If yes, describe changes in insurance or financial issues. na   Since the previous refill, were any specialty medication doses or scheduled injections missed or delayed?  No   If yes, please provide the amount na   Why were doses missed? na   Does this patient require a clinical escalation to a pharmacist? No            Delivery Questions      Flowsheet Row Most Recent Value   Copay verified? Yes   Copay amount 0.00   Copay form of payment No copayment ($0)              Medication Adherence    Adherence tools used: calendar   Other adherence tool: Pt receives injection in clinic monthly   Support network for adherence: healthcare provider          Follow-up: 28 day(s)     Dottie Kern, Pharmacy Technician  Specialty Pharmacy Technician        "

## 2024-03-25 ENCOUNTER — DISEASE STATE MANAGEMENT VISIT (OUTPATIENT)
Dept: PHARMACY | Facility: HOSPITAL | Age: 59
End: 2024-03-25
Payer: COMMERCIAL

## 2024-03-25 NOTE — PROGRESS NOTES
[facility]  Lipid Management Program - [facility]     Sánchez Chandler is a 58 y.o. male referred to the [facility] by [facility] for clinical pharmacy and specialty pharmacy management of [facility].  Sánchez Chandler is treated for hyperlipidemia and currently does not take anything other than Repatha for his cholesterol.  In the past, Pt has tried Lipitor and Zetia and had allergic reaction of hives/rash with Lipitor. The patient denies any allergies to latex.  Patient reports continued tolerance of medication and denies any adverse effects. Patient has reports to clinic for injection and therefore has not missed any doses.       Relevant Past Medical History and Comorbidities  Past Medical History:   Diagnosis Date    HTN (hypertension)      Social History     Socioeconomic History    Marital status:    Tobacco Use    Smoking status: Never    Smokeless tobacco: Never   Vaping Use    Vaping status: Never Used   Substance and Sexual Activity    Alcohol use: No    Drug use: No    Sexual activity: Defer       Allergies  Atorvastatin and Penicillins    Current Medication List    Current Outpatient Medications:     acetaminophen (TYLENOL) 500 MG tablet, Take 1 tablet by mouth 2 (Two) Times a Day As Needed for Mild Pain., Disp: , Rfl:     albuterol sulfate  (90 Base) MCG/ACT inhaler, Inhale 2 puffs Every 4 (Four) Hours As Needed for Wheezing., Disp: 8 g, Rfl: 1    amLODIPine (NORVASC) 5 MG tablet, Take 1 tablet by mouth Daily., Disp: 30 tablet, Rfl: 11    cyclobenzaprine (FLEXERIL) 10 MG tablet, Take 1 tablet by mouth 3 (Three) Times a Day As Needed for Muscle Spasms for up to 15 doses., Disp: 15 tablet, Rfl: 0    Evolocumab with Infusor (REPATHA) solution cartridge, Inject 3.5 mL under the skin into the appropriate area as directed Every 28 (Twenty-Eight) Days., Disp: 3.5 mL, Rfl: 5    Flaxseed, Linseed, (FLAXSEED OIL) 1000 MG capsule, Take 1 capsule by mouth  Daily., Disp: , Rfl:     metoprolol succinate XL (TOPROL-XL) 50 MG 24 hr tablet, Take 1 tablet by mouth Daily., Disp: 30 tablet, Rfl: 11    naproxen (EC NAPROSYN) 500 MG EC tablet, Take 1 tablet by mouth 2 (Two) Times a Day As Needed for Mild Pain., Disp: 12 tablet, Rfl: 0    Drug Interactions  No known drug drug interactions with Repatha expected according to literature     Relevant Laboratory Values  Lab Results   Component Value Date    CHOL 129 03/28/2023    TRIG 126 03/28/2023    HDL 32 (L) 03/28/2023    LDL 74 03/28/2023       Medication Assessment & Plan    The patient would like to follow-up in clinic for next injection. Care Coordinator to set up future refill outreaches, coordinate prescription delivery, and escalate clinical questions to pharmacist.     Most recent LDL is 74 while taking Repatha on 3/28/23. Per [facility]'s cardiology note, LDL is at goal and recommended to continue Repatha. Advised patient to have lipid panel drawn before next injection.    Student pharmacist, Kathleen Eisenberg, placed Repatha 420mg on RIGHT arm. Pt denies any issues or adverse effects and reports he is still tolerating injection well.     As always patient was asked to remain on campus for 15 minutes after infusion. Patient reported feeling well when leaving clinic.     Will follow-up in 1 month for next injection.    Thank you,     Thania Brewster RPH  3/25/2024  09:11 EDT

## 2024-04-09 DIAGNOSIS — I10 ESSENTIAL HYPERTENSION: ICD-10-CM

## 2024-04-09 RX ORDER — AMLODIPINE BESYLATE 5 MG/1
5 TABLET ORAL DAILY
Qty: 30 TABLET | Refills: 5 | Status: SHIPPED | OUTPATIENT
Start: 2024-04-09

## 2024-04-09 RX ORDER — METOPROLOL SUCCINATE 50 MG/1
50 TABLET, EXTENDED RELEASE ORAL DAILY
Qty: 30 TABLET | Refills: 5 | Status: SHIPPED | OUTPATIENT
Start: 2024-04-09

## 2024-04-19 ENCOUNTER — SPECIALTY PHARMACY (OUTPATIENT)
Dept: PHARMACY | Facility: HOSPITAL | Age: 59
End: 2024-04-19
Payer: COMMERCIAL

## 2024-04-19 NOTE — PROGRESS NOTES
"Specialty Pharmacy Refill Coordination Note     Sánchez \"SHELLEY\" is a 58 y.o. male contacted today regarding refills of  Repatha [facility] specialty medication(s).    Reviewed and verified with patient:       Specialty medication(s) and dose(s) confirmed: yes              Medication Adherence    Adherence tools used: calendar   Other adherence tool: Pt receives injection in clinic monthly   Support network for adherence: healthcare provider          Follow-up: 28 day(s)     Dotite Kern, Pharmacy Technician  Specialty Pharmacy Technician        "

## 2024-04-22 ENCOUNTER — DISEASE STATE MANAGEMENT VISIT (OUTPATIENT)
Dept: PHARMACY | Facility: HOSPITAL | Age: 59
End: 2024-04-22
Payer: COMMERCIAL

## 2024-04-22 NOTE — PROGRESS NOTES
[facility]  Lipid Management Program - [facility]     Sánchez Chandler is a 58 y.o. male referred to the [facility] by Caridad Cassidy for clinical pharmacy and specialty pharmacy management of [facility].  Sánchez Chandler is treated for hyperlipidemia and currently does not take anything other than Repatha for his cholesterol.  In the past, Pt has tried Lipitor and Zetia and had allergic reaction of hives/rash with Lipitor. The patient denies any allergies to latex.  Patient reports continued tolerance of medication and denies any adverse effects. Patient has reports to clinic for injection and therefore has not missed any doses.       Relevant Past Medical History and Comorbidities  Past Medical History:   Diagnosis Date    HTN (hypertension)      Social History     Socioeconomic History    Marital status:    Tobacco Use    Smoking status: Never    Smokeless tobacco: Never   Vaping Use    Vaping status: Never Used   Substance and Sexual Activity    Alcohol use: No    Drug use: No    Sexual activity: Defer       Allergies  Atorvastatin and Penicillins    Current Medication List    Current Outpatient Medications:     acetaminophen (TYLENOL) 500 MG tablet, Take 1 tablet by mouth 2 (Two) Times a Day As Needed for Mild Pain., Disp: , Rfl:     albuterol sulfate  (90 Base) MCG/ACT inhaler, Inhale 2 puffs Every 4 (Four) Hours As Needed for Wheezing., Disp: 8 g, Rfl: 1    amLODIPine (NORVASC) 5 MG tablet, Take 1 tablet by mouth Daily., Disp: 30 tablet, Rfl: 5    cyclobenzaprine (FLEXERIL) 10 MG tablet, Take 1 tablet by mouth 3 (Three) Times a Day As Needed for Muscle Spasms for up to 15 doses., Disp: 15 tablet, Rfl: 0    Evolocumab with Infusor (REPATHA) solution cartridge, Inject 3.5 mL under the skin into the appropriate area as directed Every 28 (Twenty-Eight) Days., Disp: 3.5 mL, Rfl: 5    Flaxseed, Linseed, (FLAXSEED OIL) 1000 MG capsule, Take 1 capsule by mouth Daily.,  Disp: , Rfl:     metoprolol succinate XL (TOPROL-XL) 50 MG 24 hr tablet, Take 1 tablet by mouth Daily., Disp: 30 tablet, Rfl: 5    naproxen (EC NAPROSYN) 500 MG EC tablet, Take 1 tablet by mouth 2 (Two) Times a Day As Needed for Mild Pain., Disp: 12 tablet, Rfl: 0    Drug Interactions  No known drug drug interactions with Repatha expected according to literature     Relevant Laboratory Values  Lab Results   Component Value Date    CHOL 129 03/28/2023    TRIG 126 03/28/2023    HDL 32 (L) 03/28/2023    LDL 74 03/28/2023       Medication Assessment & Plan    The patient would like to follow-up in clinic for next injection. Care Coordinator to set up future refill outreaches, coordinate prescription delivery, and escalate clinical questions to pharmacist.     Most recent LDL is 74 while taking Repatha on 3/28/23. Per Caridad Cassidy's most recent cardiology note from 2/12/24, LDL is at goal and recommended to continue Repatha. Reminded patient to have lipid panel drawn before next injection. Patient agreeable.    I administered Repatha 420mg on back of patient's LEFT arm. Pt denies any issues or adverse effects and reports he is still tolerating injection well.     As always patient was asked to remain on campus for 15 minutes after infusion. Patient reported feeling well when leaving clinic.     Will follow-up in 1 month for next injection.    Thank you,     Thania Brewster RPH  4/22/2024  08:44 EDT

## 2024-05-14 ENCOUNTER — SPECIALTY PHARMACY (OUTPATIENT)
Dept: PHARMACY | Facility: HOSPITAL | Age: 59
End: 2024-05-14
Payer: COMMERCIAL

## 2024-05-14 NOTE — PROGRESS NOTES
"Specialty Pharmacy Refill Coordination Note     Sánchez \"SHELLEY\" is a 58 y.o. male contacted today regarding refills of  repatha Pushtronex specialty medication(s).    Reviewed and verified with patient:       Specialty medication(s) and dose(s) confirmed: yes    Refill Questions      Flowsheet Row Most Recent Value   Changes to allergies? No   Changes to medications? No   New conditions or infections since last clinic visit No   Unplanned office visit, urgent care, ED, or hospital admission in the last 4 weeks  No   How does patient/caregiver feel medication is working? Very good   Financial problems or insurance changes  No   If yes, describe changes in insurance or financial issues. na   Since the previous refill, were any specialty medication doses or scheduled injections missed or delayed?  No   If yes, please provide the amount na   Why were doses missed? na   Does this patient require a clinical escalation to a pharmacist? No                  Medication Adherence    Adherence tools used: calendar   Other adherence tool: Pt receives injection in clinic monthly   Support network for adherence: healthcare provider          Follow-up: 28 day(s)     Dottie Kern, Pharmacy Technician  Specialty Pharmacy Technician        "

## 2024-05-20 ENCOUNTER — LAB (OUTPATIENT)
Dept: LAB | Facility: HOSPITAL | Age: 59
End: 2024-05-20
Payer: COMMERCIAL

## 2024-05-20 ENCOUNTER — DISEASE STATE MANAGEMENT VISIT (OUTPATIENT)
Dept: PHARMACY | Facility: HOSPITAL | Age: 59
End: 2024-05-20
Payer: COMMERCIAL

## 2024-05-20 DIAGNOSIS — E78.5 DYSLIPIDEMIA: ICD-10-CM

## 2024-05-20 LAB
CHOLEST SERPL-MCNC: 120 MG/DL (ref 0–200)
HDLC SERPL-MCNC: 35 MG/DL (ref 40–60)
LDLC SERPL CALC-MCNC: 62 MG/DL (ref 0–100)
LDLC/HDLC SERPL: 1.71 {RATIO}
TRIGL SERPL-MCNC: 126 MG/DL (ref 0–150)
VLDLC SERPL-MCNC: 23 MG/DL (ref 5–40)

## 2024-05-20 PROCEDURE — 80061 LIPID PANEL: CPT

## 2024-05-20 PROCEDURE — 36415 COLL VENOUS BLD VENIPUNCTURE: CPT

## 2024-05-20 NOTE — PROGRESS NOTES
[facility]  Lipid Management Program - [facility]     Sánchez Chandler is a 58 y.o. male referred to the [facility] by Caridad Cassidy for clinical pharmacy and specialty pharmacy management of [facility].  Sánchez Chandler is treated for hyperlipidemia and currently does not take anything other than Repatha for his cholesterol.  In the past, Pt has tried Lipitor and Zetia and had allergic reaction of hives/rash with Lipitor. The patient denies any allergies to latex.      Patient reports continued tolerance of medication and denies any adverse effects. Patient has reports to clinic for injection and therefore has not missed any doses.       Relevant Past Medical History and Comorbidities  Past Medical History:   Diagnosis Date    HTN (hypertension)      Social History     Socioeconomic History    Marital status:    Tobacco Use    Smoking status: Never    Smokeless tobacco: Never   Vaping Use    Vaping status: Never Used   Substance and Sexual Activity    Alcohol use: No    Drug use: No    Sexual activity: Defer       Allergies  Atorvastatin and Penicillins    Current Medication List    Current Outpatient Medications:     acetaminophen (TYLENOL) 500 MG tablet, Take 1 tablet by mouth 2 (Two) Times a Day As Needed for Mild Pain., Disp: , Rfl:     albuterol sulfate  (90 Base) MCG/ACT inhaler, Inhale 2 puffs Every 4 (Four) Hours As Needed for Wheezing., Disp: 8 g, Rfl: 1    amLODIPine (NORVASC) 5 MG tablet, Take 1 tablet by mouth Daily., Disp: 30 tablet, Rfl: 5    cyclobenzaprine (FLEXERIL) 10 MG tablet, Take 1 tablet by mouth 3 (Three) Times a Day As Needed for Muscle Spasms for up to 15 doses., Disp: 15 tablet, Rfl: 0    Evolocumab with Infusor (REPATHA) solution cartridge, Inject 3.5 mL under the skin into the appropriate area as directed Every 28 (Twenty-Eight) Days., Disp: 3.5 mL, Rfl: 5    Flaxseed, Linseed, (FLAXSEED OIL) 1000 MG capsule, Take 1 capsule by mouth  Daily., Disp: , Rfl:     metoprolol succinate XL (TOPROL-XL) 50 MG 24 hr tablet, Take 1 tablet by mouth Daily., Disp: 30 tablet, Rfl: 5    naproxen (EC NAPROSYN) 500 MG EC tablet, Take 1 tablet by mouth 2 (Two) Times a Day As Needed for Mild Pain., Disp: 12 tablet, Rfl: 0    Drug Interactions  No known drug drug interactions with Repatha expected according to literature     Relevant Laboratory Values  Lab Results   Component Value Date    CHOL 120 05/20/2024    TRIG 126 05/20/2024    HDL 35 (L) 05/20/2024    LDL 62 05/20/2024       Medication Assessment & Plan    The patient would like to follow-up in clinic for next injection. Care Coordinator to set up future refill outreaches, coordinate prescription delivery, and escalate clinical questions to pharmacist.     Most recent LDL is 62 while taking Repatha on 5/20/24. Per Caridad Cassidy's most recent cardiology note from 2/12/24, LDL is at goal and recommended to continue Repatha. Next appointment with Caridad is 6/12/24.    I administered Repatha 420mg on back of patient's RIGHT arm. Pt denies any issues or adverse effects and reports he is still tolerating injection well.     As always patient was asked to remain on campus for 15 minutes after infusion. Patient reported feeling well when leaving clinic.     Will follow-up in 1 month for next injection.    Thank you,     Tamara Garnett, PharmD  5/20/2024  08:52 EDT

## 2024-06-12 ENCOUNTER — SPECIALTY PHARMACY (OUTPATIENT)
Dept: PHARMACY | Facility: HOSPITAL | Age: 59
End: 2024-06-12
Payer: COMMERCIAL

## 2024-06-12 ENCOUNTER — OFFICE VISIT (OUTPATIENT)
Dept: CARDIOLOGY | Facility: CLINIC | Age: 59
End: 2024-06-12
Payer: COMMERCIAL

## 2024-06-12 VITALS
SYSTOLIC BLOOD PRESSURE: 159 MMHG | HEIGHT: 72 IN | OXYGEN SATURATION: 95 % | BODY MASS INDEX: 33.7 KG/M2 | DIASTOLIC BLOOD PRESSURE: 82 MMHG | HEART RATE: 83 BPM | WEIGHT: 248.8 LBS

## 2024-06-12 DIAGNOSIS — E78.5 DYSLIPIDEMIA: ICD-10-CM

## 2024-06-12 DIAGNOSIS — I10 ESSENTIAL HYPERTENSION: Primary | ICD-10-CM

## 2024-06-12 PROCEDURE — 1159F MED LIST DOCD IN RCRD: CPT | Performed by: NURSE PRACTITIONER

## 2024-06-12 PROCEDURE — 1160F RVW MEDS BY RX/DR IN RCRD: CPT | Performed by: NURSE PRACTITIONER

## 2024-06-12 PROCEDURE — 3077F SYST BP >= 140 MM HG: CPT | Performed by: NURSE PRACTITIONER

## 2024-06-12 PROCEDURE — 99213 OFFICE O/P EST LOW 20 MIN: CPT | Performed by: NURSE PRACTITIONER

## 2024-06-12 PROCEDURE — 3079F DIAST BP 80-89 MM HG: CPT | Performed by: NURSE PRACTITIONER

## 2024-06-12 NOTE — PROGRESS NOTES
"Specialty Pharmacy Refill Coordination Note     Sánchez \"SHELLEY\" is a 58 y.o. male contacted today regarding refills of  Leqvio specialty medication(s).    Reviewed and verified with patient:       Specialty medication(s) and dose(s) confirmed: yes    Refill Questions      Flowsheet Row Most Recent Value   Changes to allergies? No   Changes to medications? No   New conditions or infections since last clinic visit No   Unplanned office visit, urgent care, ED, or hospital admission in the last 4 weeks  No   How does patient/caregiver feel medication is working? Very good   Financial problems or insurance changes  No   If yes, describe changes in insurance or financial issues. na   Since the previous refill, were any specialty medication doses or scheduled injections missed or delayed?  No   If yes, please provide the amount na   Why were doses missed? na   Does this patient require a clinical escalation to a pharmacist? No            Delivery Questions      Flowsheet Row Most Recent Value   Copay verified? Yes   Copay amount 0   Copay form of payment No copayment ($0)              Medication Adherence    Adherence tools used: calendar   Other adherence tool: Pt receives injection in clinic monthly   Support network for adherence: healthcare provider          Follow-up: 90 day(s)     Dottie Kern, Pharmacy Technician  Specialty Pharmacy Technician        "

## 2024-06-12 NOTE — PROGRESS NOTES
"Oliverio Escobar MD  Sánchez Chandler  1965 06/12/2024    Patient Active Problem List   Diagnosis    Essential hypertension    History of chest pain    Dyslipidemia       Dear Oliverio Escobar MD:    Subjective     Chief Complaint   Patient presents with    Follow-up           History of Present Illness:    Sánchez Chandler is a 58 y.o. male with a past medical history of hypertension and dyslipidemia. He presents today for cardiology follow up. The patient denies chest pain, shortness of breath, palpitations, dizziness, lightheadedness, near syncope, and syncope. BP is elevated today, but he has not yet taken his antihypertensives this morning. He follows with the lipid clinic. His LDL has been well controlled on Repatha.             Allergies   Allergen Reactions    Atorvastatin Hives and Rash     Pt states atorvastatin make \"itchy, red bumps appear all over his body\"    Penicillins    :      Current Outpatient Medications:     acetaminophen (TYLENOL) 500 MG tablet, Take 1 tablet by mouth 2 (Two) Times a Day As Needed for Mild Pain., Disp: , Rfl:     albuterol sulfate  (90 Base) MCG/ACT inhaler, Inhale 2 puffs Every 4 (Four) Hours As Needed for Wheezing., Disp: 8 g, Rfl: 1    amLODIPine (NORVASC) 5 MG tablet, Take 1 tablet by mouth Daily., Disp: 30 tablet, Rfl: 5    cyclobenzaprine (FLEXERIL) 10 MG tablet, Take 1 tablet by mouth 3 (Three) Times a Day As Needed for Muscle Spasms for up to 15 doses., Disp: 15 tablet, Rfl: 0    Evolocumab with Infusor (REPATHA) solution cartridge, Inject 3.5 mL under the skin into the appropriate area as directed Every 28 (Twenty-Eight) Days., Disp: 3.5 mL, Rfl: 5    Flaxseed, Linseed, (FLAXSEED OIL) 1000 MG capsule, Take 1 capsule by mouth Daily., Disp: , Rfl:     metoprolol succinate XL (TOPROL-XL) 50 MG 24 hr tablet, Take 1 tablet by mouth Daily., Disp: 30 tablet, Rfl: 5    naproxen (EC NAPROSYN) 500 MG EC tablet, Take 1 tablet by mouth 2 (Two) Times a Day " "As Needed for Mild Pain., Disp: 12 tablet, Rfl: 0      The following portions of the patient's history were reviewed and updated as appropriate: allergies, current medications, past family history, past medical history, past social history, past surgical history and problem list.    Social History     Tobacco Use    Smoking status: Never    Smokeless tobacco: Never   Vaping Use    Vaping status: Never Used   Substance Use Topics    Alcohol use: No    Drug use: No       Review of Systems   Constitutional: Negative for decreased appetite and malaise/fatigue.   Cardiovascular:  Negative for chest pain, dyspnea on exertion and palpitations.   Respiratory:  Negative for cough and shortness of breath.        Objective   Vitals:    06/12/24 0814   BP: 159/82   BP Location: Left arm   Patient Position: Sitting   Cuff Size: Adult   Pulse: 83   SpO2: 95%   Weight: 113 kg (248 lb 12.8 oz)   Height: 182.9 cm (72.01\")     Body mass index is 33.74 kg/m².        Vitals reviewed.   Constitutional:       Appearance: Healthy appearance. Well-developed and not in distress.   HENT:      Head: Normocephalic and atraumatic.   Pulmonary:      Effort: Pulmonary effort is normal.      Breath sounds: Normal breath sounds. No wheezing. No rales.   Cardiovascular:      Normal rate. Regular rhythm.      Murmurs: There is no murmur.      . No S3 and S4 gallop.   Edema:     Peripheral edema absent.   Abdominal:      General: Bowel sounds are normal.      Palpations: Abdomen is soft.   Skin:     General: Skin is warm and dry.   Neurological:      Mental Status: Alert, oriented to person, place, and time and oriented to person, place and time.   Psychiatric:         Mood and Affect: Mood normal.         Behavior: Behavior normal.         Lab Results   Component Value Date     07/23/2023    K 4.3 07/23/2023     (H) 07/23/2023    CO2 19.5 (L) 07/23/2023    BUN 15 07/23/2023    CREATININE 1.08 07/23/2023    GLUCOSE 111 (H) 07/23/2023    " CALCIUM 8.9 07/23/2023    AST 26 07/23/2023    ALT 34 07/23/2023    ALKPHOS 77 07/23/2023     Lab Results   Component Value Date    WBC 2.88 (L) 07/23/2023    HGB 15.0 07/23/2023    HCT 46.6 07/23/2023     (L) 07/23/2023     Lab Results   Component Value Date    TRIG 126 05/20/2024    HDL 35 (L) 05/20/2024    LDL 62 05/20/2024          Results for orders placed in visit on 03/22/13    SCANNED - ECHOCARDIOGRAM     Results for orders placed in visit on 03/22/13    SCANNED - STRESS TEST           Procedures      Assessment & Plan    Diagnosis Plan   1. Essential hypertension        2. Dyslipidemia                     Recommendations:    Essential hypertension - Since he has not yet taken his BP medication today, will continue with current dose of amlodipine and metoprolol. Advised to monitor at home with BP goals.  Dyslipidemia - continue Repatha. LDL at goal.  Follow up in 6 months or sooner if needed.        Return in about 6 months (around 12/12/2024) for Recheck.    As always, I appreciate very much the opportunity to participate in the cardiovascular care of your patients.      With Best Regards,    JULIÁN Paz

## 2024-06-17 ENCOUNTER — SPECIALTY PHARMACY (OUTPATIENT)
Dept: PHARMACY | Facility: HOSPITAL | Age: 59
End: 2024-06-17
Payer: COMMERCIAL

## 2024-06-17 ENCOUNTER — DISEASE STATE MANAGEMENT VISIT (OUTPATIENT)
Dept: PHARMACY | Facility: HOSPITAL | Age: 59
End: 2024-06-17
Payer: COMMERCIAL

## 2024-06-17 DIAGNOSIS — E78.5 DYSLIPIDEMIA: Primary | ICD-10-CM

## 2024-06-17 RX ORDER — INCLISIRAN 284 MG/1.5ML
284 INJECTION, SOLUTION SUBCUTANEOUS
Qty: 1.5 ML | Refills: 1 | Status: SHIPPED | OUTPATIENT
Start: 2024-06-17

## 2024-06-17 NOTE — PROGRESS NOTES
[facility]  Lipid Management Program - [facility]io (Inclisiran)     Sánchez Chandler is a 58 y.o. male referred to the [facility] by Caridad Cassidy for clinical pharmacy and specialty pharmacy management of [facility]cl[facility].  Sánchez Chandler is  treated for hyperlipidemia and currently takes Repatha Pushtronex.  In the past, Pt has tried Lipitor and Zetia and had an allergic reaction of hives/rash with Lipitor.     Patient is transitioning to Leqvio since Repatha Pushtronex will no longer be produced. Since patient prefers to receive injection in clinic, decided to pursue Leqvio for convenience.    Relevant Past Medical History and Comorbidities  Past Medical History:   Diagnosis Date    HTN (hypertension)      Social History     Socioeconomic History    Marital status:    Tobacco Use    Smoking status: Never    Smokeless tobacco: Never   Vaping Use    Vaping status: Never Used   Substance and Sexual Activity    Alcohol use: No    Drug use: No    Sexual activity: Defer       Allergies  Atorvastatin and Penicillins    Current Medication List    Current Outpatient Medications:     acetaminophen (TYLENOL) 500 MG tablet, Take 1 tablet by mouth 2 (Two) Times a Day As Needed for Mild Pain., Disp: , Rfl:     albuterol sulfate  (90 Base) MCG/ACT inhaler, Inhale 2 puffs Every 4 (Four) Hours As Needed for Wheezing., Disp: 8 g, Rfl: 1    amLODIPine (NORVASC) 5 MG tablet, Take 1 tablet by mouth Daily., Disp: 30 tablet, Rfl: 5    cyclobenzaprine (FLEXERIL) 10 MG tablet, Take 1 tablet by mouth 3 (Three) Times a Day As Needed for Muscle Spasms for up to 15 doses., Disp: 15 tablet, Rfl: 0    Flaxseed, Linseed, (FLAXSEED OIL) 1000 MG capsule, Take 1 capsule by mouth Daily., Disp: , Rfl:     Inclisiran Sodium (Leqvio) 284 MG/1.5ML solution prefilled syringe, Inject 1.5 mL under the skin into the appropriate area as directed Every 3 (Three) Months., Disp: 1.5 mL, Rfl: 1    metoprolol  succinate XL (TOPROL-XL) 50 MG 24 hr tablet, Take 1 tablet by mouth Daily., Disp: 30 tablet, Rfl: 5    naproxen (EC NAPROSYN) 500 MG EC tablet, Take 1 tablet by mouth 2 (Two) Times a Day As Needed for Mild Pain., Disp: 12 tablet, Rfl: 0    Drug Interactions  No drug interactions expected with Leqvio according to current literature.     Relevant Laboratory Values  Lab Results   Component Value Date    CHOL 120 05/20/2024    TRIG 126 05/20/2024    HDL 35 (L) 05/20/2024    LDL 62 05/20/2024       Medication Assessment & Plan    Administered Leqvio 284mg SUBQ in LEFT ARM.   Medication education provided, including:   Common Adverse Effects: Injection site reactions, arthralgias, UTIs, diarrhea, bronchitis, pain in extremity, and dyspnea.  Potential for allergic reaction.  Go to ED/call 911 with any S/Sx of allergic reaction.   Must be administered by a HCP.  If a dose is missed, please call to reschedule.   Expect LDL reduction, to help reduce cardiovascular risk.   At each visit, patient will need to stay a minimum of 15 min for monitoring   Lipid panel will be checked 4 to 12 weeks after starting therapy, order placed.   Patient will continue regular follow-up with cardiology  Will follow up in 3 months for next injection.     Nay Mcneill PharmD  6/17/2024  09:27 EDT

## 2024-06-17 NOTE — PROGRESS NOTES
[facility]  Lipid Management Program - [facility]io (Inclisiran)     Sánchez Chandler is a 58 y.o. male referred to the [facility] by Caridad Cassidy for clinical pharmacy and specialty pharmacy management of Incl[facility]an.  Sánchez Chandler is  treated for hyperlipidemia and currently takes Repatha Pushtronex.  In the past, Pt has tried Lipitor and Zetia and had an allergic reaction of hives/rash with Lipitor.     Patient is transitioning to Leqvio since Repatha Pushtronex will no longer be produced. Since patient prefers to receive injection in clinic, decided to pursue Leqvio for convenience.    Relevant Past Medical History and Comorbidities  Past Medical History:   Diagnosis Date    HTN (hypertension)      Social History     Socioeconomic History    Marital status:    Tobacco Use    Smoking status: Never    Smokeless tobacco: Never   Vaping Use    Vaping status: Never Used   Substance and Sexual Activity    Alcohol use: No    Drug use: No    Sexual activity: Defer       Allergies  Atorvastatin and Penicillins    Current Medication List    Current Outpatient Medications:     acetaminophen (TYLENOL) 500 MG tablet, Take 1 tablet by mouth 2 (Two) Times a Day As Needed for Mild Pain., Disp: , Rfl:     albuterol sulfate  (90 Base) MCG/ACT inhaler, Inhale 2 puffs Every 4 (Four) Hours As Needed for Wheezing., Disp: 8 g, Rfl: 1    amLODIPine (NORVASC) 5 MG tablet, Take 1 tablet by mouth Daily., Disp: 30 tablet, Rfl: 5    cyclobenzaprine (FLEXERIL) 10 MG tablet, Take 1 tablet by mouth 3 (Three) Times a Day As Needed for Muscle Spasms for up to 15 doses., Disp: 15 tablet, Rfl: 0    Evolocumab with Infusor (REPATHA) solution cartridge, Inject 3.5 mL under the skin into the appropriate area as directed Every 28 (Twenty-Eight) Days., Disp: 3.5 mL, Rfl: 5    Flaxseed, Linseed, (FLAXSEED OIL) 1000 MG capsule, Take 1 capsule by mouth Daily., Disp: , Rfl:     metoprolol succinate XL  (TOPROL-XL) 50 MG 24 hr tablet, Take 1 tablet by mouth Daily., Disp: 30 tablet, Rfl: 5    naproxen (EC NAPROSYN) 500 MG EC tablet, Take 1 tablet by mouth 2 (Two) Times a Day As Needed for Mild Pain., Disp: 12 tablet, Rfl: 0    Drug Interactions  No drug interactions expected with Leqvio according to current literature.     Relevant Laboratory Values  Lab Results   Component Value Date    CHOL 120 05/20/2024    TRIG 126 05/20/2024    HDL 35 (L) 05/20/2024    LDL 62 05/20/2024       Medication Assessment & Plan    Administered Leqvio 284mg SUBQ in LEFT ARM.   Medication education provided, including:   Common Adverse Effects: Injection site reactions, arthralgias, UTIs, diarrhea, bronchitis, pain in extremity, and dyspnea.  Potential for allergic reaction.  Go to ED/call 911 with any S/Sx of allergic reaction.   Must be administered by a HCP.  If a dose is missed, please call to reschedule.   Expect LDL reduction, to help reduce cardiovascular risk.   At each visit, patient will need to stay a minimum of 15 min for monitoring   Lipid panel will be checked 4 to 12 weeks after starting therapy, order placed.   Patient will continue regular follow-up with cardiology  Will follow up in 3 months for next injection.     Nay Mcneill PharmD  6/17/2024  08:53 EDT

## 2024-09-10 ENCOUNTER — SPECIALTY PHARMACY (OUTPATIENT)
Dept: PHARMACY | Facility: HOSPITAL | Age: 59
End: 2024-09-10
Payer: COMMERCIAL

## 2024-09-17 ENCOUNTER — SPECIALTY PHARMACY (OUTPATIENT)
Dept: PHARMACY | Facility: HOSPITAL | Age: 59
End: 2024-09-17
Payer: COMMERCIAL

## 2024-09-17 ENCOUNTER — DISEASE STATE MANAGEMENT VISIT (OUTPATIENT)
Dept: PHARMACY | Facility: HOSPITAL | Age: 59
End: 2024-09-17
Payer: COMMERCIAL

## 2024-09-18 ENCOUNTER — LAB (OUTPATIENT)
Dept: LAB | Facility: HOSPITAL | Age: 59
End: 2024-09-18
Payer: COMMERCIAL

## 2024-09-18 DIAGNOSIS — E78.5 DYSLIPIDEMIA: ICD-10-CM

## 2024-09-18 LAB
CHOLEST SERPL-MCNC: 146 MG/DL (ref 0–200)
HDLC SERPL-MCNC: 30 MG/DL (ref 40–60)
LDLC SERPL CALC-MCNC: 93 MG/DL (ref 0–100)
LDLC/HDLC SERPL: 3.03 {RATIO}
TRIGL SERPL-MCNC: 125 MG/DL (ref 0–150)
VLDLC SERPL-MCNC: 23 MG/DL (ref 5–40)

## 2024-09-18 PROCEDURE — 36415 COLL VENOUS BLD VENIPUNCTURE: CPT

## 2024-09-18 PROCEDURE — 80061 LIPID PANEL: CPT

## 2024-12-12 ENCOUNTER — OFFICE VISIT (OUTPATIENT)
Dept: CARDIOLOGY | Facility: CLINIC | Age: 59
End: 2024-12-12
Payer: COMMERCIAL

## 2024-12-12 VITALS
DIASTOLIC BLOOD PRESSURE: 87 MMHG | BODY MASS INDEX: 33.18 KG/M2 | HEART RATE: 105 BPM | OXYGEN SATURATION: 94 % | HEIGHT: 72 IN | SYSTOLIC BLOOD PRESSURE: 153 MMHG | WEIGHT: 245 LBS

## 2024-12-12 DIAGNOSIS — I10 ESSENTIAL HYPERTENSION: Primary | ICD-10-CM

## 2024-12-12 DIAGNOSIS — E78.5 DYSLIPIDEMIA: ICD-10-CM

## 2024-12-12 RX ORDER — METOPROLOL SUCCINATE 50 MG/1
25 TABLET, EXTENDED RELEASE ORAL DAILY
Qty: 30 TABLET | Refills: 5 | Status: SHIPPED | OUTPATIENT
Start: 2024-12-12

## 2024-12-12 RX ORDER — AMLODIPINE BESYLATE 5 MG/1
10 TABLET ORAL DAILY
Qty: 60 TABLET | Refills: 5 | Status: SHIPPED | OUTPATIENT
Start: 2024-12-12

## 2024-12-12 NOTE — PROGRESS NOTES
"Oliverio Escobar MD  Sánchez Chandler  1965 06/12/2024    Patient Active Problem List   Diagnosis    Essential hypertension    History of chest pain    Dyslipidemia       Dear Oliverio Escobar MD:    Subjective     Chief Complaint   Patient presents with    Follow-up     6 Month Follow up       Med Management                  History of Present Illness:    Sánchez Chandler is a 59 y.o. male with a past medical history of hypertension and dyslipidemia. He presents today for cardiology follow up. The patient denies chest pain, shortness of breath, palpitations, dizziness, lightheadedness, near syncope, and syncope. BP is elevated today, but he has not yet taken his antihypertensives this morning. He follows with the lipid clinic. His LDL has been well controlled on Repatha.     Today's visit 12/12/2024.  Patient's 10-year ASCVD risk is 13.9% which is an indication for a statin or lipid-lowering therapy goal of LDL at least less than 100. Patient previously on Repatha for his dyslipidemia and ASCVD risk reduction which she did not tolerate and subsequently was switched to Leqvio.  He has previously not tolerated statins as well.  In today's visit patient specific complaints or concerns.  He denies having any exertional chest pain shortness of breath palpitations dizziness lightheadedness or syncope.  Blood pressure is elevated in today's visit as he reports that he has not taken his blood pressure medications today.      Allergies   Allergen Reactions    Atorvastatin Hives and Rash     Pt states atorvastatin make \"itchy, red bumps appear all over his body\"    Penicillins    :      Current Outpatient Medications:     acetaminophen (TYLENOL) 500 MG tablet, Take 1 tablet by mouth 2 (Two) Times a Day As Needed for Mild Pain., Disp: , Rfl:     albuterol sulfate  (90 Base) MCG/ACT inhaler, Inhale 2 puffs Every 4 (Four) Hours As Needed for Wheezing., Disp: 8 g, Rfl: 1    amLODIPine (NORVASC) 5 MG " "tablet, Take 2 tablets by mouth Daily., Disp: 60 tablet, Rfl: 5    Flaxseed, Linseed, (FLAXSEED OIL) 1000 MG capsule, Take 1 capsule by mouth Daily., Disp: , Rfl:     Inclisiran Sodium (Leqvio) 284 MG/1.5ML solution prefilled syringe, Inject 1.5 mL under the skin into the appropriate area as directed Every 3 (Three) Months., Disp: 1.5 mL, Rfl: 1    metoprolol succinate XL (TOPROL-XL) 50 MG 24 hr tablet, Take 0.5 tablets by mouth Daily., Disp: 30 tablet, Rfl: 5    cyclobenzaprine (FLEXERIL) 10 MG tablet, Take 1 tablet by mouth 3 (Three) Times a Day As Needed for Muscle Spasms for up to 15 doses. (Patient not taking: Reported on 12/12/2024), Disp: 15 tablet, Rfl: 0    naproxen (EC NAPROSYN) 500 MG EC tablet, Take 1 tablet by mouth 2 (Two) Times a Day As Needed for Mild Pain. (Patient not taking: Reported on 12/12/2024), Disp: 12 tablet, Rfl: 0      The following portions of the patient's history were reviewed and updated as appropriate: allergies, current medications, past family history, past medical history, past social history, past surgical history and problem list.    Social History     Tobacco Use    Smoking status: Never    Smokeless tobacco: Never   Vaping Use    Vaping status: Never Used   Substance Use Topics    Alcohol use: No    Drug use: No     Review of system:  Negative except what is reported in the HPI.    Objective   Vitals:    12/12/24 0928   BP: 153/87   BP Location: Left arm   Patient Position: Sitting   Cuff Size: Adult   Pulse: 105   SpO2: 94%   Weight: 111 kg (245 lb)   Height: 182.9 cm (72.01\")     Body mass index is 33.22 kg/m².        Vitals reviewed.   Constitutional:       Appearance: Healthy appearance. Well-developed and not in distress.   HENT:      Head: Normocephalic and atraumatic.   Pulmonary:      Effort: Pulmonary effort is normal.      Breath sounds: Normal breath sounds. No wheezing. No rales.   Cardiovascular:      Normal rate. Regular rhythm.      Murmurs: There is no murmur. "      . No S3 and S4 gallop.   Edema:     Peripheral edema absent.   Abdominal:      General: Bowel sounds are normal.      Palpations: Abdomen is soft.   Skin:     General: Skin is warm and dry.   Neurological:      Mental Status: Alert, oriented to person, place, and time and oriented to person, place and time.   Psychiatric:         Mood and Affect: Mood normal.         Behavior: Behavior normal.         Lab Results   Component Value Date     07/23/2023    K 4.3 07/23/2023     (H) 07/23/2023    CO2 19.5 (L) 07/23/2023    BUN 15 07/23/2023    CREATININE 1.08 07/23/2023    GLUCOSE 111 (H) 07/23/2023    CALCIUM 8.9 07/23/2023    AST 26 07/23/2023    ALT 34 07/23/2023    ALKPHOS 77 07/23/2023     Lab Results   Component Value Date    WBC 2.88 (L) 07/23/2023    HGB 15.0 07/23/2023    HCT 46.6 07/23/2023     (L) 07/23/2023     Lab Results   Component Value Date    TRIG 125 09/18/2024    HDL 30 (L) 09/18/2024    LDL 93 09/18/2024          Results for orders placed in visit on 03/22/13    SCANNED - ECHOCARDIOGRAM     Results for orders placed in visit on 03/22/13    SCANNED - STRESS TEST               Procedures      Assessment & Plan    Diagnosis Plan   1. Essential hypertension  metoprolol succinate XL (TOPROL-XL) 50 MG 24 hr tablet    amLODIPine (NORVASC) 5 MG tablet      2. Dyslipidemia                       Recommendations:    Essential hypertension -noted to have high blood pressure on multiple prior office visits.  Will increase amlodipine to 10 mg p.o. once daily patient reports that he would prefer to take 2 pills of 5 mg amlodipine.  Will continue Toprol-XL 25 mg p.o. once daily.  Offered the patient to send a new prescription of 25 mg but patient prefers to cut the 50 mg tablet in half.   Dyslipidemia - Patient's 10-year ASCVD risk is 13.9% which is an indication for a statin or lipid-lowering therapy goal of LDL at least less than 100. Did not tolerate Repatha so was switched to Leqvio.   Will continue Leqvio at this time for goal of LDL of less than 100  Provide reviewed prior echocardiogram and stress test from 2013.  Since patient is not reporting any new symptoms.  No indication at this time to repeat echocardiogram or stress testing.  Denies smoking cigarettes.  He does report  having high salt intake.  Advised the patient to try to eat more fresh fruits and vegetables and limit his salt intake.   Follow up in 3 months or sooner if needed.        Return in about 3 months (around 3/12/2025).    As always, I appreciate very much the opportunity to participate in the cardiovascular care of your patients.      With Best Regards,    Shaila Bermeo MD

## 2025-03-10 ENCOUNTER — DISEASE STATE MANAGEMENT VISIT (OUTPATIENT)
Dept: PHARMACY | Facility: HOSPITAL | Age: 60
End: 2025-03-10
Payer: COMMERCIAL

## 2025-03-10 ENCOUNTER — SPECIALTY PHARMACY (OUTPATIENT)
Dept: PHARMACY | Facility: HOSPITAL | Age: 60
End: 2025-03-10
Payer: COMMERCIAL

## 2025-03-10 DIAGNOSIS — E78.5 DYSLIPIDEMIA: Primary | ICD-10-CM

## 2025-03-10 RX ORDER — INCLISIRAN 284 MG/1.5ML
1.5 INJECTION, SOLUTION SUBCUTANEOUS
Qty: 1.5 ML | Refills: 0 | Status: SHIPPED | OUTPATIENT
Start: 2025-03-10 | End: 2025-03-10 | Stop reason: SDUPTHER

## 2025-03-10 RX ORDER — INCLISIRAN 284 MG/1.5ML
1.5 INJECTION, SOLUTION SUBCUTANEOUS
Qty: 1.5 ML | Refills: 0 | Status: SHIPPED | OUTPATIENT
Start: 2025-03-10

## 2025-03-10 NOTE — PROGRESS NOTES
"   Medication Management Clinic  Lipid Management Program - Leqvio (Inclisiran)     Sánchez Chandler  is a 59 y.o. male referred by their provider, Caridad Cassidy, to the Hyperlipidemia Patient Management program offered by [facility] for Lipid Management.  Sánchez Chandler is  treated for hyperlipidemia and currently takes Leqvio. In the past, Pt has tried Lipitor and Zetia and had an allergic reaction of hives/rash with Lipitor. Patient has also tried Repatha Pushtronex and had to stop due to discontinuation of product by the .     A Follow-up outreach was conducted, including assessment of therapy appropriateness and specialty medication education for LEQVIO (inclisiran). The patient was introduced to services offered by [facility], including: regular assessments, refill coordination, curbside pick-up or mail order delivery options, prior authorization maintenance, and financial assistance programs as applicable. The patient was also provided with contact information for the pharmacy team.      Patient reports tolerating Leqvio well without any issues. He denies any side effects and has not missed any doses.     Drug Coverage   [employer] [place]    Relevant Past Medical History and Comorbidities  Past Medical History:   Diagnosis Date    HTN (hypertension)      Social History     Socioeconomic History    Marital status:    Tobacco Use    Smoking status: Never    Smokeless tobacco: Never   Vaping Use    Vaping status: Never Used   Substance and Sexual Activity    Alcohol use: No    Drug use: No    Sexual activity: Defer         Allergies  Known allergies and reactions were discussed with the patient. The patient's chart has been reviewed for  allergy information and updated as necessary.   Allergies   Allergen Reactions    Atorvastatin Hives and Rash     Pt states atorvastatin make \"itchy, red bumps appear all over his body\"    " Penicillins        Relevant Laboratory Values  Lab Results   Component Value Date    CHOL 146 09/18/2024    TRIG 125 09/18/2024    HDL 30 (L) 09/18/2024    LDL 93 09/18/2024       Current Medication List    Current Outpatient Medications:     acetaminophen (TYLENOL) 500 MG tablet, Take 1 tablet by mouth 2 (Two) Times a Day As Needed for Mild Pain., Disp: , Rfl:     albuterol sulfate  (90 Base) MCG/ACT inhaler, Inhale 2 puffs Every 4 (Four) Hours As Needed for Wheezing., Disp: 8 g, Rfl: 1    amLODIPine (NORVASC) 5 MG tablet, Take 2 tablets by mouth Daily., Disp: 60 tablet, Rfl: 5    cyclobenzaprine (FLEXERIL) 10 MG tablet, Take 1 tablet by mouth 3 (Three) Times a Day As Needed for Muscle Spasms for up to 15 doses., Disp: 15 tablet, Rfl: 0    Flaxseed, Linseed, (FLAXSEED OIL) 1000 MG capsule, Take 1 capsule by mouth Daily., Disp: , Rfl:     Inclisiran Sodium (Leqvio) 284 MG/1.5ML solution prefilled syringe, Inject 1.5 mL under the skin into the appropriate area as directed Every 6 (Six) Months., Disp: 1.5 mL, Rfl: 0    metoprolol succinate XL (TOPROL-XL) 50 MG 24 hr tablet, Take 0.5 tablets by mouth Daily., Disp: 30 tablet, Rfl: 5    naproxen (EC NAPROSYN) 500 MG EC tablet, Take 1 tablet by mouth 2 (Two) Times a Day As Needed for Mild Pain., Disp: 12 tablet, Rfl: 0    Medicines reviewed by Tamara Garnett, PharmD on 3/10/2025 at  8:51 AM  Medicines reviewed by Tamara Garnett, PharmD on 3/10/2025 at  8:51 AM    Drug Interactions  None with leqvio    Goals of Therapy  LDL Reduction     Medication Assessment & Plan  Patient will begin Leqvio 284mg SC once, 3 months later, then every 6 months.  Administered Leqvio 284mg SUBQ in back of the left arm.          1st dose: 6/17/24  2nd dose: 9/17/24  3rd dose: 3/10/25 (L: [id number], Exp: 5/31/2026)  Medication education provided, including:   Common Adverse Effects: Injection site reactions, arthralgias, & bronchitis.  Potential for allergic reaction.  Go to ED/call  [phone] with any S/Sx of allergic reaction.   Must be administered by a HCP.  If a dose is missed, please call to reschedule.   Expect LDL reduction, to help reduce cardiovascular risk.   At each visit, patient will need to stay a minimum of 15 min for monitoring   Lipid panel will be checked in 4 weeks. LDL is still above goal from labs in September 2024.   Patient will continue regular follow-up with cardiology. Next 3/12/25  Will follow up in 6 months for next injection.     Tamara Garnett, PharmD  3/10/2025  08:59 EDT

## 2025-03-10 NOTE — PROGRESS NOTES
Medication Management Clinic  Lipid Management Program - Leqvio (Inclisiran)     Sánchez Chandler  is a 59 y.o. male referred by their provider, Caridad Cassidy, to the Hyperlipidemia Patient Management program offered by [facility] for Lipid Management.  Sánchez Chandler is  treated for hyperlipidemia and currently takes Leqvio. In the past, Pt has tried Lipitor and Zetia and had an allergic reaction of hives/rash with Lipitor. Patient has also tried Repatha Pushtronex and had to stop due to discontinuation of product by the .     A Follow-up outreach was conducted, including assessment of therapy appropriateness and specialty medication education for LEQVIO (inclisiran). The patient was introduced to services offered by [facility], including: regular assessments, refill coordination, curbside pick-up or mail order delivery options, prior authorization maintenance, and financial assistance programs as applicable. The patient was also provided with contact information for the pharmacy team.      Patient reports tolerating Leqvio well without any issues. He denies any side effects and has not missed any doses. Patient receives injections in clinic and does not miss appointments.    Drug Coverage   [employer] [place]    Relevant Past Medical History and Comorbidities  Past Medical History:   Diagnosis Date    HTN (hypertension)      Social History     Socioeconomic History    Marital status:    Tobacco Use    Smoking status: Never    Smokeless tobacco: Never   Vaping Use    Vaping status: Never Used   Substance and Sexual Activity    Alcohol use: No    Drug use: No    Sexual activity: Defer         Allergies  Known allergies and reactions were discussed with the patient. The patient's chart has been reviewed for  allergy information and updated as necessary.   Allergies   Allergen Reactions    Atorvastatin Hives and Rash     Pt states  "atorvastatin make \"itchy, red bumps appear all over his body\"    Penicillins        Relevant Laboratory Values  Lab Results   Component Value Date    CHOL 146 09/18/2024    TRIG 125 09/18/2024    HDL 30 (L) 09/18/2024    LDL 93 09/18/2024       Current Medication List    Current Outpatient Medications:     acetaminophen (TYLENOL) 500 MG tablet, Take 1 tablet by mouth 2 (Two) Times a Day As Needed for Mild Pain., Disp: , Rfl:     albuterol sulfate  (90 Base) MCG/ACT inhaler, Inhale 2 puffs Every 4 (Four) Hours As Needed for Wheezing., Disp: 8 g, Rfl: 1    amLODIPine (NORVASC) 5 MG tablet, Take 2 tablets by mouth Daily., Disp: 60 tablet, Rfl: 5    cyclobenzaprine (FLEXERIL) 10 MG tablet, Take 1 tablet by mouth 3 (Three) Times a Day As Needed for Muscle Spasms for up to 15 doses., Disp: 15 tablet, Rfl: 0    Flaxseed, Linseed, (FLAXSEED OIL) 1000 MG capsule, Take 1 capsule by mouth Daily., Disp: , Rfl:     Inclisiran Sodium (Leqvio) 284 MG/1.5ML solution prefilled syringe, Inject 1.5 mL under the skin into the appropriate area as directed Every 6 (Six) Months., Disp: 1.5 mL, Rfl: 0    metoprolol succinate XL (TOPROL-XL) 50 MG 24 hr tablet, Take 0.5 tablets by mouth Daily., Disp: 30 tablet, Rfl: 5    naproxen (EC NAPROSYN) 500 MG EC tablet, Take 1 tablet by mouth 2 (Two) Times a Day As Needed for Mild Pain., Disp: 12 tablet, Rfl: 0    Medicines reviewed by Tamara Garnett, PharmD on 3/10/2025 at  9:05 AM    Drug Interactions  None with leqvio    Goals of Therapy  LDL < 70 mg/dl    3/10/25 MN: last LDL recorded was from spetember 2024, LDL 93 mg/dl. Will reorder lipid panel and have patient recheck in 1 month     Medication Assessment & Plan  Patient will begin Leqvio 284mg SC once, 3 months later, then every 6 months.  Administered Leqvio 284mg SUBQ in back of the left arm.          1st dose: 6/17/24  2nd dose: 9/17/24  3rd dose: 3/10/25 (L: [id number], Exp: 5/31/2026)  Medication education provided, including: "   Common Adverse Effects: Injection site reactions, arthralgias, & bronchitis.  Potential for allergic reaction.  Go to ED/call 911 with any S/Sx of allergic reaction.   Must be administered by a HCP.  If a dose is missed, please call to reschedule.   Expect LDL reduction, to help reduce cardiovascular risk.   At each visit, patient will need to stay a minimum of 15 min for monitoring   Lipid panel will be checked in 4 weeks. LDL is still above goal from labs in September 2024.   Patient will continue regular follow-up with cardiology. Next 3/12/25  Will follow up in 6 months for next injection.     Tamara Garnett, PharmD  3/10/2025  09:05 EDT

## 2025-03-12 ENCOUNTER — OFFICE VISIT (OUTPATIENT)
Dept: CARDIOLOGY | Facility: CLINIC | Age: 60
End: 2025-03-12
Payer: COMMERCIAL

## 2025-03-12 VITALS
SYSTOLIC BLOOD PRESSURE: 157 MMHG | BODY MASS INDEX: 33.7 KG/M2 | DIASTOLIC BLOOD PRESSURE: 79 MMHG | HEIGHT: 72 IN | WEIGHT: 248.8 LBS | HEART RATE: 92 BPM | OXYGEN SATURATION: 96 %

## 2025-03-12 DIAGNOSIS — E78.5 DYSLIPIDEMIA: ICD-10-CM

## 2025-03-12 DIAGNOSIS — I10 ESSENTIAL HYPERTENSION: Primary | ICD-10-CM

## 2025-03-12 PROCEDURE — 1160F RVW MEDS BY RX/DR IN RCRD: CPT | Performed by: NURSE PRACTITIONER

## 2025-03-12 PROCEDURE — 1159F MED LIST DOCD IN RCRD: CPT | Performed by: NURSE PRACTITIONER

## 2025-03-12 PROCEDURE — 3078F DIAST BP <80 MM HG: CPT | Performed by: NURSE PRACTITIONER

## 2025-03-12 PROCEDURE — 99214 OFFICE O/P EST MOD 30 MIN: CPT | Performed by: NURSE PRACTITIONER

## 2025-03-12 PROCEDURE — 93000 ELECTROCARDIOGRAM COMPLETE: CPT | Performed by: NURSE PRACTITIONER

## 2025-03-12 PROCEDURE — 3077F SYST BP >= 140 MM HG: CPT | Performed by: NURSE PRACTITIONER

## 2025-03-12 RX ORDER — HYDROCHLOROTHIAZIDE 25 MG/1
25 TABLET ORAL DAILY
Qty: 30 TABLET | Refills: 11 | Status: SHIPPED | OUTPATIENT
Start: 2025-03-12

## 2025-03-12 NOTE — PROGRESS NOTES
"Oliverio Escobar MD  Sánchez Chandler  1965 03/12/2025    Patient Active Problem List   Diagnosis    Essential hypertension    History of chest pain    Dyslipidemia       Dear Oliverio Escobar MD:    Subjective     Chief Complaint   Patient presents with    Follow-up     3 MONTH FOLLOW UP    Med Management     MEDICATIONS PROVIDED.   CHANGES HAVE BEEN UPDATED.            History of Present Illness:    Sánchez Chandler is a 59 y.o. male with a past medical history of hypertension and dyslipidemia. He presents today for cardiology follow up. The patient denies chest pain, shortness of breath, palpitations, dizziness, lightheadedness, near syncope, and syncope. His BP is elevated today. He has his medication bottles with him. He is taking amlodipine 10 mg daily and metoprolol succinate 25 mg daily. He states he cannot take a higher dose of metoprolol because it causes dizziness. He is now on Leqvio for his cholesterol. He plans to have a lipid panel done in 3 weeks.          Allergies   Allergen Reactions    Atorvastatin Hives and Rash     Pt states atorvastatin make \"itchy, red bumps appear all over his body\"    Penicillins    :      Current Outpatient Medications:     acetaminophen (TYLENOL) 500 MG tablet, Take 1 tablet by mouth 2 (Two) Times a Day As Needed for Mild Pain., Disp: , Rfl:     albuterol sulfate  (90 Base) MCG/ACT inhaler, Inhale 2 puffs Every 4 (Four) Hours As Needed for Wheezing., Disp: 8 g, Rfl: 1    amLODIPine (NORVASC) 5 MG tablet, Take 2 tablets by mouth Daily., Disp: 60 tablet, Rfl: 5    Flaxseed, Linseed, (FLAXSEED OIL) 1000 MG capsule, Take 1 capsule by mouth Daily., Disp: , Rfl:     Inclisiran Sodium (Leqvio) 284 MG/1.5ML solution prefilled syringe, Inject 1.5 mL under the skin into the appropriate area as directed Every 6 (Six) Months., Disp: 1.5 mL, Rfl: 0    metoprolol succinate XL (TOPROL-XL) 50 MG 24 hr tablet, Take 0.5 tablets by mouth Daily., Disp: 30 tablet, " "Rfl: 5    cyclobenzaprine (FLEXERIL) 10 MG tablet, Take 1 tablet by mouth 3 (Three) Times a Day As Needed for Muscle Spasms for up to 15 doses. (Patient not taking: Reported on 3/12/2025), Disp: 15 tablet, Rfl: 0    hydroCHLOROthiazide 25 MG tablet, Take 1 tablet by mouth Daily., Disp: 30 tablet, Rfl: 11    naproxen (EC NAPROSYN) 500 MG EC tablet, Take 1 tablet by mouth 2 (Two) Times a Day As Needed for Mild Pain. (Patient not taking: Reported on 3/12/2025), Disp: 12 tablet, Rfl: 0      The following portions of the patient's history were reviewed and updated as appropriate: allergies, current medications, past family history, past medical history, past social history, past surgical history and problem list.    Social History     Tobacco Use    Smoking status: Never    Smokeless tobacco: Never   Vaping Use    Vaping status: Never Used   Substance Use Topics    Alcohol use: No    Drug use: No       Review of Systems   Constitutional: Negative for decreased appetite and malaise/fatigue.   Cardiovascular:  Negative for chest pain, dyspnea on exertion and palpitations.   Respiratory:  Negative for cough and shortness of breath.        Objective   Vitals:    03/12/25 1001   BP: 157/79   BP Location: Left arm   Patient Position: Sitting   Cuff Size: Adult   Pulse: 92   SpO2: 96%   Weight: 113 kg (248 lb 12.8 oz)   Height: 182.9 cm (72.01\")     Body mass index is 33.74 kg/m².        Vitals reviewed.   Constitutional:       Appearance: Healthy appearance. Well-developed and not in distress.   HENT:      Head: Normocephalic and atraumatic.   Pulmonary:      Effort: Pulmonary effort is normal.      Breath sounds: Normal breath sounds. No wheezing. No rales.   Cardiovascular:      Normal rate. Regular rhythm.      Murmurs: There is no murmur.      . No S3 and S4 gallop.   Edema:     Peripheral edema absent.   Abdominal:      General: Bowel sounds are normal.      Palpations: Abdomen is soft.   Skin:     General: Skin is warm " and dry.   Neurological:      Mental Status: Alert, oriented to person, place, and time and oriented to person, place and time.   Psychiatric:         Mood and Affect: Mood normal.         Behavior: Behavior normal.         Lab Results   Component Value Date     07/23/2023    K 4.3 07/23/2023     (H) 07/23/2023    CO2 19.5 (L) 07/23/2023    BUN 15 07/23/2023    CREATININE 1.08 07/23/2023    GLUCOSE 111 (H) 07/23/2023    CALCIUM 8.9 07/23/2023    AST 26 07/23/2023    ALT 34 07/23/2023    ALKPHOS 77 07/23/2023     Lab Results   Component Value Date    WBC 2.88 (L) 07/23/2023    HGB 15.0 07/23/2023    HCT 46.6 07/23/2023     (L) 07/23/2023     Lab Results   Component Value Date    TRIG 125 09/18/2024    HDL 30 (L) 09/18/2024    LDL 93 09/18/2024          Results for orders placed in visit on 03/22/13    SCANNED - ECHOCARDIOGRAM     Results for orders placed in visit on 03/22/13    SCANNED - STRESS TEST             ECG 12 Lead    Date/Time: 3/12/2025 10:32 AM  Performed by: Caridad Cassidy APRN    Authorized by: Caridad Cassidy APRN  Comparison: compared with previous ECG   Similar to previous ECG  Rhythm: sinus rhythm  BPM: 96  Conduction: right bundle branch block          Assessment & Plan    Diagnosis Plan   1. Essential hypertension  ECG 12 Lead    hydroCHLOROthiazide 25 MG tablet    Comprehensive Metabolic Panel      2. Dyslipidemia  ECG 12 Lead               Recommendations:  Essential hypertension-BP uncontrolled.  Will continue with amlodipine 10 mg daily.  I am unable to titrate metoprolol due to reported intolerance.  Therefore we will continue with metoprolol succinate 25 mg daily.  Will start HCTZ 25 mg daily and check a CMP in 2 weeks.  Dyslipidemia-continue Leqvio.  He is intolerant to statins and Repatha.  Lipid panel ordered to be done in 2 weeks.  Follow-up in 3 months or sooner if needed.      Return in about 3 months (around 6/12/2025) for Recheck.    As always, I appreciate  very much the opportunity to participate in the cardiovascular care of your patients.      With Best Regards,    JULIÁN Paz

## 2025-04-07 ENCOUNTER — LAB (OUTPATIENT)
Dept: LAB | Facility: HOSPITAL | Age: 60
End: 2025-04-07
Payer: COMMERCIAL

## 2025-04-07 DIAGNOSIS — E78.5 DYSLIPIDEMIA: ICD-10-CM

## 2025-04-07 DIAGNOSIS — I10 ESSENTIAL HYPERTENSION: ICD-10-CM

## 2025-04-07 LAB
ALBUMIN SERPL-MCNC: 4.3 G/DL (ref 3.5–5.2)
ALBUMIN/GLOB SERPL: 1.4 G/DL
ALP SERPL-CCNC: 88 U/L (ref 39–117)
ALT SERPL W P-5'-P-CCNC: 39 U/L (ref 1–41)
ANION GAP SERPL CALCULATED.3IONS-SCNC: 11.5 MMOL/L (ref 5–15)
AST SERPL-CCNC: 24 U/L (ref 1–40)
BILIRUB SERPL-MCNC: 0.4 MG/DL (ref 0–1.2)
BUN SERPL-MCNC: 20 MG/DL (ref 6–20)
BUN/CREAT SERPL: 16.8 (ref 7–25)
CALCIUM SPEC-SCNC: 9.5 MG/DL (ref 8.6–10.5)
CHLORIDE SERPL-SCNC: 104 MMOL/L (ref 98–107)
CHOLEST SERPL-MCNC: 149 MG/DL (ref 0–200)
CO2 SERPL-SCNC: 22.5 MMOL/L (ref 22–29)
CREAT SERPL-MCNC: 1.19 MG/DL (ref 0.76–1.27)
EGFRCR SERPLBLD CKD-EPI 2021: 70.8 ML/MIN/1.73
GLOBULIN UR ELPH-MCNC: 3.1 GM/DL
GLUCOSE SERPL-MCNC: 92 MG/DL (ref 65–99)
HDLC SERPL-MCNC: 28 MG/DL (ref 40–60)
LDLC SERPL CALC-MCNC: 83 MG/DL (ref 0–100)
LDLC/HDLC SERPL: 2.73 {RATIO}
POTASSIUM SERPL-SCNC: 4.2 MMOL/L (ref 3.5–5.2)
PROT SERPL-MCNC: 7.4 G/DL (ref 6–8.5)
SODIUM SERPL-SCNC: 138 MMOL/L (ref 136–145)
TRIGL SERPL-MCNC: 223 MG/DL (ref 0–150)
VLDLC SERPL-MCNC: 38 MG/DL (ref 5–40)

## 2025-04-07 PROCEDURE — 80061 LIPID PANEL: CPT

## 2025-04-07 PROCEDURE — 36415 COLL VENOUS BLD VENIPUNCTURE: CPT

## 2025-04-07 PROCEDURE — 80053 COMPREHEN METABOLIC PANEL: CPT

## 2025-06-12 ENCOUNTER — OFFICE VISIT (OUTPATIENT)
Dept: CARDIOLOGY | Facility: CLINIC | Age: 60
End: 2025-06-12
Payer: COMMERCIAL

## 2025-06-12 VITALS
HEIGHT: 72 IN | OXYGEN SATURATION: 90 % | HEART RATE: 89 BPM | RESPIRATION RATE: 16 BRPM | WEIGHT: 239.6 LBS | BODY MASS INDEX: 32.45 KG/M2 | DIASTOLIC BLOOD PRESSURE: 79 MMHG | SYSTOLIC BLOOD PRESSURE: 138 MMHG

## 2025-06-12 DIAGNOSIS — I10 ESSENTIAL HYPERTENSION: Primary | ICD-10-CM

## 2025-06-12 DIAGNOSIS — E78.5 DYSLIPIDEMIA: ICD-10-CM

## 2025-06-12 PROCEDURE — 1160F RVW MEDS BY RX/DR IN RCRD: CPT | Performed by: NURSE PRACTITIONER

## 2025-06-12 PROCEDURE — 3075F SYST BP GE 130 - 139MM HG: CPT | Performed by: NURSE PRACTITIONER

## 2025-06-12 PROCEDURE — 1159F MED LIST DOCD IN RCRD: CPT | Performed by: NURSE PRACTITIONER

## 2025-06-12 PROCEDURE — 99214 OFFICE O/P EST MOD 30 MIN: CPT | Performed by: NURSE PRACTITIONER

## 2025-06-12 PROCEDURE — 3078F DIAST BP <80 MM HG: CPT | Performed by: NURSE PRACTITIONER

## 2025-06-12 RX ORDER — METOPROLOL SUCCINATE 50 MG/1
25 TABLET, EXTENDED RELEASE ORAL DAILY
Qty: 30 TABLET | Refills: 11 | Status: SHIPPED | OUTPATIENT
Start: 2025-06-12

## 2025-06-12 RX ORDER — AMLODIPINE BESYLATE 5 MG/1
10 TABLET ORAL DAILY
Qty: 60 TABLET | Refills: 11 | Status: SHIPPED | OUTPATIENT
Start: 2025-06-12

## 2025-06-12 NOTE — PROGRESS NOTES
"Oliverio Escobar MD  Sánchez Chandler  1965 06/12/2025    Patient Active Problem List   Diagnosis    Essential hypertension    History of chest pain    Dyslipidemia       Dear Oliverio Escobar MD:    Subjective     Chief Complaint   Patient presents with    Follow-up     3 Month Follow up   Lab results      Med Management     Medication bottles present at appointment.,            History of Present Illness:    Sánchez Chandler is a 59 y.o. male with a past medical history of hypertension and dyslipidemia. He presents today for cardiology follow up. Previously, HCTZ was added for his high BP. His BP is much improved in the office today. Recently he had a lipid panel which showed LDL of 83. He is tolerating Leqvio well. The patient denies chest pain, shortness of breath, palpitations, dizziness, lightheadedness, near syncope, and syncope. 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             Allergies   Allergen Reactions    Atorvastatin Hives and Rash     Pt states atorvastatin make \"itchy, red bumps appear all over his body\"    Penicillins    :      Current Outpatient Medications:     acetaminophen (TYLENOL) 500 MG tablet, Take 1 tablet by mouth 2 (Two) Times a Day As Needed for Mild Pain., Disp: , Rfl:     albuterol sulfate  (90 Base) MCG/ACT inhaler, Inhale 2 puffs Every 4 (Four) Hours As Needed for Wheezing., Disp: 8 g, Rfl: 1    amLODIPine (NORVASC) 5 MG tablet, Take 2 tablets by mouth Daily., Disp: 60 tablet, Rfl: 11    Flaxseed, Linseed, (FLAXSEED OIL) 1000 MG capsule, Take 1 capsule by mouth Daily., Disp: , Rfl:     hydroCHLOROthiazide 25 MG tablet, Take 1 tablet by mouth Daily., Disp: 30 tablet, Rfl: 11    Inclisiran Sodium (Leqvio) 284 MG/1.5ML solution prefilled syringe, Inject 1.5 mL under the skin into the appropriate area as directed Every 6 (Six) Months., Disp: 1.5 mL, Rfl: 0    metoprolol succinate XL (TOPROL-XL) 50 MG 24 hr tablet, Take 0.5 tablets by " "mouth Daily., Disp: 30 tablet, Rfl: 11      The following portions of the patient's history were reviewed and updated as appropriate: allergies, current medications, past family history, past medical history, past social history, past surgical history and problem list.    Social History     Tobacco Use    Smoking status: Never    Smokeless tobacco: Never   Vaping Use    Vaping status: Never Used   Substance Use Topics    Alcohol use: No    Drug use: No       Review of Systems   Constitutional: Negative for decreased appetite and malaise/fatigue.   Cardiovascular:  Negative for chest pain, dyspnea on exertion and palpitations.   Respiratory:  Negative for cough and shortness of breath.        Objective   Vitals:    06/12/25 0832   BP: 138/79   BP Location: Left arm   Patient Position: Sitting   Cuff Size: Adult   Pulse: 89   Resp: 16   SpO2: 90%   Weight: 109 kg (239 lb 9.6 oz)   Height: 182.9 cm (72.01\")     Body mass index is 32.49 kg/m².        Vitals reviewed.   Constitutional:       Appearance: Healthy appearance. Well-developed and not in distress.   HENT:      Head: Normocephalic and atraumatic.   Pulmonary:      Effort: Pulmonary effort is normal.      Breath sounds: Normal breath sounds. No wheezing. No rales.   Cardiovascular:      Normal rate. Regular rhythm.      Murmurs: There is no murmur.      . No S3 and S4 gallop.   Edema:     Peripheral edema absent.   Abdominal:      General: Bowel sounds are normal.      Palpations: Abdomen is soft.   Skin:     General: Skin is warm and dry.   Neurological:      Mental Status: Alert, oriented to person, place, and time and oriented to person, place and time.   Psychiatric:         Mood and Affect: Mood normal.         Behavior: Behavior normal.         Lab Results   Component Value Date     04/07/2025    K 4.2 04/07/2025     04/07/2025    CO2 22.5 04/07/2025    BUN 20 04/07/2025    CREATININE 1.19 04/07/2025    GLUCOSE 92 04/07/2025    CALCIUM 9.5 " 04/07/2025    AST 24 04/07/2025    ALT 39 04/07/2025    ALKPHOS 88 04/07/2025     Lab Results   Component Value Date    WBC 2.88 (L) 07/23/2023    HGB 15.0 07/23/2023    HCT 46.6 07/23/2023     (L) 07/23/2023     Lab Results   Component Value Date    TRIG 223 (H) 04/07/2025    HDL 28 (L) 04/07/2025    LDL 83 04/07/2025          Results for orders placed in visit on 03/22/13    SCANNED - ECHOCARDIOGRAM     Results for orders placed in visit on 03/22/13    SCANNED - STRESS TEST           Procedures    Assessment & Plan    Diagnosis Plan   1. Essential hypertension  amLODIPine (NORVASC) 5 MG tablet    metoprolol succinate XL (TOPROL-XL) 50 MG 24 hr tablet      2. Dyslipidemia                 Recommendations:  Essential Hypertension - BP now well controlled. Continue amlodipine, HCTZ, and metoprolol. Creatinine and potassium recently normal.  Dyslipidemia - LDL at goal (<100). Continue Leqvio.  Follow up in 7 or 8 months or sooner if needed.       Return in about 8 months (around 2/12/2026) for Recheck.    As always, I appreciate very much the opportunity to participate in the cardiovascular care of your patients.      With Best Regards,    JULIÁN Paz

## 2025-06-16 DIAGNOSIS — I10 ESSENTIAL HYPERTENSION: ICD-10-CM

## 2025-06-16 RX ORDER — HYDROCHLOROTHIAZIDE 25 MG/1
25 TABLET ORAL DAILY
Qty: 30 TABLET | Refills: 11 | Status: SHIPPED | OUTPATIENT
Start: 2025-06-16